# Patient Record
Sex: FEMALE | Race: OTHER | HISPANIC OR LATINO | Employment: OTHER | URBAN - METROPOLITAN AREA
[De-identification: names, ages, dates, MRNs, and addresses within clinical notes are randomized per-mention and may not be internally consistent; named-entity substitution may affect disease eponyms.]

---

## 2018-07-20 ENCOUNTER — OFFICE VISIT (OUTPATIENT)
Dept: FAMILY MEDICINE CLINIC | Facility: CLINIC | Age: 58
End: 2018-07-20
Payer: COMMERCIAL

## 2018-07-20 VITALS
BODY MASS INDEX: 45.82 KG/M2 | TEMPERATURE: 98 F | RESPIRATION RATE: 16 BRPM | DIASTOLIC BLOOD PRESSURE: 80 MMHG | WEIGHT: 198 LBS | HEIGHT: 55 IN | HEART RATE: 92 BPM | SYSTOLIC BLOOD PRESSURE: 150 MMHG | OXYGEN SATURATION: 97 %

## 2018-07-20 DIAGNOSIS — G89.29 CHRONIC LOW BACK PAIN WITH RIGHT-SIDED SCIATICA, UNSPECIFIED BACK PAIN LATERALITY: ICD-10-CM

## 2018-07-20 DIAGNOSIS — R79.89 LFT ELEVATION: ICD-10-CM

## 2018-07-20 DIAGNOSIS — I10 ESSENTIAL HYPERTENSION: Primary | ICD-10-CM

## 2018-07-20 DIAGNOSIS — R73.9 HYPERGLYCEMIA: ICD-10-CM

## 2018-07-20 DIAGNOSIS — R00.2 PALPITATIONS: ICD-10-CM

## 2018-07-20 DIAGNOSIS — M54.41 CHRONIC LOW BACK PAIN WITH RIGHT-SIDED SCIATICA, UNSPECIFIED BACK PAIN LATERALITY: ICD-10-CM

## 2018-07-20 DIAGNOSIS — E78.5 HYPERLIPIDEMIA, UNSPECIFIED HYPERLIPIDEMIA TYPE: ICD-10-CM

## 2018-07-20 DIAGNOSIS — Z00.01 ENCOUNTER FOR GENERAL ADULT MEDICAL EXAMINATION WITH ABNORMAL FINDINGS: ICD-10-CM

## 2018-07-20 PROCEDURE — G0439 PPPS, SUBSEQ VISIT: HCPCS | Performed by: FAMILY MEDICINE

## 2018-07-20 PROCEDURE — 93000 ELECTROCARDIOGRAM COMPLETE: CPT | Performed by: FAMILY MEDICINE

## 2018-07-20 RX ORDER — LISINOPRIL 10 MG/1
10 TABLET ORAL DAILY
COMMUNITY
End: 2018-07-20 | Stop reason: SINTOL

## 2018-07-20 RX ORDER — LOSARTAN POTASSIUM 50 MG/1
25 TABLET ORAL DAILY
Qty: 30 TABLET | Refills: 5 | Status: SHIPPED | OUTPATIENT
Start: 2018-07-20 | End: 2018-10-11 | Stop reason: SDUPTHER

## 2018-07-20 RX ORDER — CARVEDILOL 12.5 MG/1
12.5 TABLET ORAL 2 TIMES DAILY WITH MEALS
COMMUNITY
End: 2018-12-17 | Stop reason: SDUPTHER

## 2018-07-20 RX ORDER — BACLOFEN 20 MG/1
20 TABLET ORAL 2 TIMES DAILY PRN
COMMUNITY
End: 2018-11-23 | Stop reason: ALTCHOICE

## 2018-07-20 RX ORDER — METFORMIN HYDROCHLORIDE 750 MG/1
750 TABLET, EXTENDED RELEASE ORAL
COMMUNITY
End: 2018-08-22

## 2018-07-20 RX ORDER — ATORVASTATIN CALCIUM 40 MG/1
40 TABLET, FILM COATED ORAL DAILY
COMMUNITY
End: 2018-08-22 | Stop reason: SINTOL

## 2018-07-20 NOTE — ASSESSMENT & PLAN NOTE
Her low back pain is continue with myelopathy symptoms which require an MRI of the lumbar spine to rule out compression of the nerve roots or herniated disc

## 2018-07-20 NOTE — PATIENT INSTRUCTIONS
Visita de bienestar para los adultos   CUIDADO AMBULATORIO:   Amaury visita de bienestar  es cuando usted acude con un médico para que le phyllis exámenes de detección de problemas de [place]  También puede obtener asesoramiento sobre cómo mantenerse saludable  Anote jess preguntas para que se acuerde de hacerlas  Pregunte a morales médico con qué frecuencia debería realizarse amaury visita de bienestar  Lo que sucede en amaury visita de bienestar:  Morales médico le preguntará sobre morales adwoa y morales historia familiar [address]  [place] incluye presión arterial gerry, enfermedades del corazón y cáncer  El médico le preguntará si tiene síntomas que le preocupen, si [facility] y [place] de ánimo  También se le preguntará acerca del uso de medicamentos, suplementos, alimentos y alcohol  Es posible que le phyllis cualquiera de lo siguiente:  · Morales peso  será revisado  Es posible que [facility] midan morales altura para calcular morales índice de masa corporal [facility]  El [facility] indica si tiene un peso saludable  · Se verificarán morales presión arterial  y frecuencia cardíaca  También pueden revisar morales temperatura  · Exámenes de [place] y [facility]  se podría realizar  Se podrían realizar exámenes de dunia para revisar los niveles de [facility]  Los niveles anormales de colesterol aumentan el riesgo de enfermedad del corazón y accidente cerebrovascular  Puede que también necesite amaury prueba de dunia u orina para revisar si tiene diabetes si usted está en mayor riesgo  Las pruebas de orina pueden hacerse para buscar signos de amaury infección o amaury enfermedad renal      · Un examen físico  incluye la comprobación de jess latidos del corazón y los pulmones con un estetoscopio  Morales médico también podría revisarle la piel para buscar daños causados por el sol  · Pruebas de detección  podría recomendarse  Se realiza un examen de detección para detectar enfermedades que pueden no causar síntomas   Los exámenes de detección que necesite dependen de morales edad, género, antecedentes familiares y hábitos de cortney  Por ejemplo, podrían recomendarle la exploración selectiva colorrectal si tiene 48 años o más  Si es [place], necesita los siguientes exámenes de detección:   · El examen de Papanicolaou  se utiliza para detectar cáncer de jose uterino  El examen del Papanicolaou por lo general se realiza entre 3 a 5 años dependiendo de morales edad  Puede necesitarlo más a menudo si usted ha tenido [employer] de la prueba de Papanicolaou en el pasado  Pregunte a morales médico con qué frecuencia debería realizarse un examen de Papanicolaou  · Amaury mamografía  es amaury radiografía de jess senos para detectar cáncer de mama  Los expertos recomiendan [address] cada 2 años empezando a los 48 años de [place]  Es probable que usted necesite [address] a los 52 años o antes si tiene riesgo alto de cáncer de seno  Hable con morales médico sobre cuándo debe empezar con jess mamografías y con cuánta frecuencia las necesita  Vacunas que podría necesitar:   · Debe recibir amaury vacuna contra la influenza  todos los [place]  La vacuna contra la influenza protege de la gripe  Varios tipos de virus causan la influenza  Debido a que los virus [place] con el [place], es necesaria la producción de nuevas vacunas cada año  · Debe recibir Igor Myers vacuna de refuerzo contra el tétanos-difteria (Td)  cada 10 años  Esta vacuna protege contra el tétanos y la difteria  El tétanos es amaury infección severa que puede causar trismo y espasmos musculares dolorosos  La difteria es amaury infección bacterial grave que produce amaury cubierta gruesa en la parte de atrás de la boca y garganta  · Debe recibir la vacuna contra el virus del papiloma humano (VPH)  si usted es rose y [place] 19 y 32 años o es hombre y [place] 23 y 24 años y nunca la recibió  Esta vacuna protege contra la infección por VPH   El virus del papiloma humano o VPH es la infección más común que se transmite por contacto sexual  El virus del papiloma humano también podría provocar cáncer vaginal, del pene y del ano  · Debe recibir la vacuna antineumocócica  si tiene más de 72 años  La vacuna antineumocócica es amaury inyección que se administra para protegerlo contra amaury enfermedad neumocócica  La enfermedad neumocócica es amaury infección causada por la bacteria neumocócica  La infección puede causar neumonía, meningitis o amaury infección del oído  · Debe recibir la vacuna contra la culebrilla  si tiene [address] [place] [address],[address] [place] [address] o [place], incluso si stern tenido culebrilla antes  La vacuna contra la culebrilla (herpes zóster) es amaury inyección usada para proteger contra el virus zóster que causa la varicela  [place] es el mismo virus que causa la varicela  La culebrilla es un sarpullido doloroso que se desarrolla en personas que tuvieron varicela o stern estado expuestas al virus  Cómo comer saludable:  [place] es un modelo para planear comidas sanas  Muestra los tipos y cantidades de alimentos que deberían ir en un plato  Reza Salmons y verduras representan alrededor de la mitad de morales plato y los granos y proteínas representan la otra mitad  Se incluye amaury porción de productos lácteos al lado del plato  La cantidad de calorías y [address] de las porciones que usted necesita dependen de morales edad, [place], peso y altura  Los ejemplos de alimentos saludables son:  · Consuma amaury variedad de verduras  srinivasa las de color luisana oscuro, cho y [facility]  Usted también puede incluir verduras enlatadas bajas en sodio (sal) y verduras congeladas sin mantequilla ni salsas [id number]  · Consuma amaury variedad de fruta frescas , las frutas enlatadas en 100% de jugo , fruta [place] y new secos  · Incluya granos enteros  Por lo menos la mitad de los granos que usted consume deben ser granos de gayatri integral  Por ejemplo, panes de grano entero, pasta integral, arroz integral y cereales de grano entero srinivasa la jorge      · Consuma amaury variedad de alimentos con proteínas srinivasa mariscos (pescado y crustáceos), carne magra y carne de ave sin piel (pavo y jose)  Ejemplos de nova magras incluyen pierna, paleta o lomo de puerco y xochitl, solomillo o, lomo de res y carne [place] de res extra [place]  Otros alimentos ricos en proteínas son los huevos y sustitutos de [place], frijoles, chícharos, productos de soya, nueces y semillas  · Elija productos lácteos bajos en grasa [employer] o del 1% o yogur, queso y requesón bajos en grasa  · Limite las grasas poco saludables,  srinivasa la [place], la margarina dura y la [place]  Ejercicio:  Realice amaury actividad física por lo menos 30 minutos al día, la mayoría de los días de la [place]  Algunos de los ejercicios incluyen caminar, montar en bicicleta, bailar y nadar  Usted también puede realizar más actividad física usando las escaleras en vez de los ascensores o estacionarse más lejos cuando [place] a las tiendas  Incluya ejercicios para fortalecer los músculos 2 días a la semana  El ejercicio regular proporciona muchos beneficios para la adwoa  Sony Hastings a controlar morales peso y [employer] riesgo de diabetes tipo 2, presión arterial gerry, enfermedad del corazón y accidente cerebrovascular  El ejercicio [employer] puede ayudar a mejorar morales estado de ánimo  Pregunte a morales médico acerca del mejor plan de ejercicio para usted  Pautas generales de adwoa y seguridad:   · No fume  La nicotina y otras sustancias químicas que contienen los cigarrillos y cigarros pueden dañar los pulmones  Pida información a morales médico si usted actualmente fuma y necesita ayuda para dejar de fumar  Los cigarrillos electrónicos o tabaco sin humo todavía contienen nicotina  Consulte con morales médico antes de [employer]  · Limite el consumo de alcohol  Un trago equivale a 12 onzas de cerveza, 5 onzas de vino o 1 onza y ½ de licor  · Baje de peso, si es necesario  El sobrepeso aumenta el riesgo de ciertas condiciones de [place]   Estos incluyen enfermedad del corazón, presión arterial gerry, diabetes tipo 2 y ciertos tipos de cáncer  · Proteja morales piel  No tome el sol ni use reuben de bronceado  Use protector solar con un SPF 13 o mayor  Aplíquese el bloqueador por lo menos 15 minutos antes de que vaya a estar al [employer]  Vuelva a aplicarse la crema solar cada 2 horas  Use ropa protectora, sombrero y lentes para el sol cuando se encuentre afuera  · Conduzca con seguridad  Use siempre morales cinturón de seguridad  Asegúrese que todos en el noel usan el cinturón de seguridad  Un cinturón de seguridad puede salvar morales cortney en saumya de un accidente automovilístico  No use el celular cuando esté manejando  [place] puede hacer que se distraiga y causar un accidente  Es mejor que pare y se orille si necesita hacer amaury Carlene Janis un texto  · Practique el sexo seguro  Use condones de látex si es sexualmente [place] y tiene más de [place]  Morales médico puede recomendar exámenes de detección de infecciones de transmisión sexual (ITS)  · Use un facundo, un chaleco salvavidas y unos implementos de protección  Siempre use un facundo al [employer] en bicicleta o motocicleta, esquiar o jugar deportes que podrían causar amaury lesión en la cynthia  Use implementos de protección cuando practique deportes  Use un chaleco salvavidas cuando esté en un bote o practicando actividades acuáticas  © 2017 [address] Information is for End User's use only and may not be sold, redistributed or otherwise used for commercial purposes  All illustrations and images included in CareNotes® are the copyrighted property of A D A M , Inc  or Isaiah Coleman  Esta información es sólo para uso en educación  Morales intención no es darle un consejo médico sobre enfermedades o tratamientos  Colsulte con morales [place] [employer] farmacéutico antes de seguir cualquier régimen médico para saber si es seguro y efectivo para usted

## 2018-07-20 NOTE — ASSESSMENT & PLAN NOTE
Her Medical issues today is obesity and lower back pain recommendation of weight control and exercise is number 1

## 2018-07-20 NOTE — PROGRESS NOTES
Assessment/Plan:    Encounter for general adult medical examination with abnormal findings  Her Medical issues today is obesity and lower back pain recommendation of weight control and exercise is number 1  Essential hypertension  Her blood pressure is and so optimal control, patient is having cough possible related to lisinopril as a side effect  We are changing to losartan 50 mg once a day, recheck her blood pressure in one month  Chronic low back pain with right-sided sciatica  Her low back pain is continue with myelopathy symptoms which require an MRI of the lumbar spine to rule out compression of the nerve roots or herniated disc    Hyperglycemia  In a diabetic setting checking hemoglobin A1c  Hyperlipidemia  Checking labs for hyperlipidemia  LFT elevation  To rule out hepatitis  Palpitations  No findings to explain current complaints of palpitation  Diagnoses and all orders for this visit:    Essential hypertension  -     POCT ECG  -     losartan (COZAAR) 50 mg tablet; Take 0 5 tablets (25 mg total) by mouth daily  -     CBC and differential; Future  -     Comprehensive metabolic panel; Future  -     Lipid Panel with Direct LDL reflex; Future  -     TSH, 3rd generation with Free T4 reflex; Future    Encounter for general adult medical examination with abnormal findings    Hyperlipidemia, unspecified hyperlipidemia type    Hyperglycemia  -     Hemoglobin A1C; Future    LFT elevation  -     Hepatitis panel, acute; Future    Palpitations    Chronic low back pain with right-sided sciatica, unspecified back pain laterality  -     Vitamin D 25 hydroxy; Future  -     MRI lumbar spine w contrast; Future  -     Ambulatory referral to Anesthesiology; Future    Other orders  -     atorvastatin (LIPITOR) 40 mg tablet; Take 40 mg by mouth daily  -     baclofen 20 mg tablet; Take 20 mg by mouth 2 (two) times a day  -     Discontinue: lisinopril (ZESTRIL) 10 mg tablet;  Take 10 mg by mouth daily  - metFORMIN (GLUCOPHAGE-XR) 750 mg 24 hr tablet; Take 750 mg by mouth daily with breakfast  -     Discontinue: traMADol-acetaminophen (ULTRACET) 37 5-325 mg per tablet; Take 1 tablet by mouth every 6 (six) hours as needed for moderate pain  -     carvedilol (COREG) 12 5 mg tablet; Take 12 5 mg by mouth 2 (two) times a day with meals          Subjective:      Patient ID: Ayah Solitario is a 60 y o  female  PT here for annual physical exam    Back Pain   This is a chronic problem  The pain is present in the lumbar spine and sacro-iliac  The pain is at a severity of 6/10  The symptoms are aggravated by bending, lying down, twisting, standing and position  Associated symptoms include abdominal pain, leg pain (Right leg) and weakness ( tendencyTo fall over the right leg which she has we care secondary to the back pain)  Pertinent negatives include no bladder incontinence, bowel incontinence, chest pain, dysuria, fever or headaches  The treatment provided mild relief  The following portions of the patient's history were reviewed and updated as appropriate: allergies, current medications, past family history, past medical history, past social history, past surgical history and problem list     Review of Systems   Constitutional: Negative for fever  Cardiovascular: Negative for chest pain  Gastrointestinal: Positive for abdominal distention and abdominal pain  Negative for bowel incontinence  Genitourinary: Negative for bladder incontinence and dysuria  Musculoskeletal: Positive for back pain  Neurological: Positive for weakness ( tendencyTo fall over the right leg which she has we care secondary to the back pain)  Negative for headaches  Objective:      /80 (BP Location: Left arm, Patient Position: Sitting, Cuff Size: Standard)   Pulse 92   Temp 98 °F (36 7 °C) (Oral)   Resp 16   Ht 4' 4" (1 321 m)   Wt 89 8 kg (198 lb)   SpO2 97%   Breastfeeding?  No   BMI 51 48 kg/m²          Physical Exam   Constitutional: She is oriented to person, place, and time  She appears well-developed and well-nourished  Obese looking  HENT:   Head: Normocephalic  Eyes: EOM are normal  Pupils are equal, round, and reactive to light  Neck: Neck supple  Cardiovascular: Normal rate, regular rhythm and normal heart sounds  Pulmonary/Chest: Effort normal and breath sounds normal    Abdominal: Soft  She exhibits distension  Musculoskeletal: Normal range of motion  She exhibits tenderness  Neurological: She is alert and oriented to person, place, and time  Skin: Skin is warm and dry  Psychiatric: She has a normal mood and affect   Her behavior is normal  Judgment and thought content normal

## 2018-07-20 NOTE — ASSESSMENT & PLAN NOTE
Her blood pressure is and so optimal control, patient is having cough possible related to lisinopril as a side effect  We are changing to losartan 50 mg once a day, recheck her blood pressure in one month

## 2018-07-25 LAB
LEFT EYE DIABETIC RETINOPATHY: NORMAL
RIGHT EYE DIABETIC RETINOPATHY: NORMAL
SEVERITY (EYE EXAM): NORMAL

## 2018-08-17 ENCOUNTER — APPOINTMENT (OUTPATIENT)
Dept: LAB | Facility: HOSPITAL | Age: 58
End: 2018-08-17
Payer: COMMERCIAL

## 2018-08-17 ENCOUNTER — TRANSCRIBE ORDERS (OUTPATIENT)
Dept: ADMINISTRATIVE | Facility: HOSPITAL | Age: 58
End: 2018-08-17

## 2018-08-17 DIAGNOSIS — R79.89 LFT ELEVATION: ICD-10-CM

## 2018-08-17 DIAGNOSIS — I10 ESSENTIAL HYPERTENSION: ICD-10-CM

## 2018-08-17 DIAGNOSIS — M54.41 CHRONIC LOW BACK PAIN WITH RIGHT-SIDED SCIATICA, UNSPECIFIED BACK PAIN LATERALITY: ICD-10-CM

## 2018-08-17 DIAGNOSIS — R73.9 HYPERGLYCEMIA: ICD-10-CM

## 2018-08-17 DIAGNOSIS — G89.29 CHRONIC LOW BACK PAIN WITH RIGHT-SIDED SCIATICA, UNSPECIFIED BACK PAIN LATERALITY: ICD-10-CM

## 2018-08-17 LAB
25(OH)D3 SERPL-MCNC: 16.6 NG/ML (ref 30–100)
ALBUMIN SERPL BCP-MCNC: 4.2 G/DL (ref 3–5.2)
ALP SERPL-CCNC: 80 U/L (ref 43–122)
ALT SERPL W P-5'-P-CCNC: 24 U/L (ref 9–52)
ANION GAP SERPL CALCULATED.3IONS-SCNC: 8 MMOL/L (ref 5–14)
AST SERPL W P-5'-P-CCNC: 16 U/L (ref 14–36)
BASOPHILS # BLD AUTO: 0 THOUSANDS/ΜL (ref 0–0.1)
BASOPHILS NFR BLD AUTO: 1 % (ref 0–1)
BILIRUB SERPL-MCNC: 0.3 MG/DL
BUN SERPL-MCNC: 16 MG/DL (ref 5–25)
CALCIUM SERPL-MCNC: 9.4 MG/DL (ref 8.4–10.2)
CHLORIDE SERPL-SCNC: 103 MMOL/L (ref 97–108)
CHOLEST SERPL-MCNC: 221 MG/DL
CO2 SERPL-SCNC: 29 MMOL/L (ref 22–30)
CREAT SERPL-MCNC: 0.57 MG/DL (ref 0.6–1.2)
EOSINOPHIL # BLD AUTO: 0.3 THOUSAND/ΜL (ref 0–0.4)
EOSINOPHIL NFR BLD AUTO: 3 % (ref 0–6)
ERYTHROCYTE [DISTWIDTH] IN BLOOD BY AUTOMATED COUNT: 14.5 %
GFR SERPL CREATININE-BSD FRML MDRD: 102 ML/MIN/1.73SQ M
GLUCOSE P FAST SERPL-MCNC: 126 MG/DL (ref 70–99)
HCT VFR BLD AUTO: 37.5 % (ref 36–46)
HDLC SERPL-MCNC: 46 MG/DL (ref 40–59)
HGB BLD-MCNC: 12.5 G/DL (ref 12–16)
LDLC SERPL CALC-MCNC: 151 MG/DL
LYMPHOCYTES # BLD AUTO: 2.3 THOUSANDS/ΜL (ref 0.5–4)
LYMPHOCYTES NFR BLD AUTO: 28 % (ref 20–50)
MCH RBC QN AUTO: 30.1 PG (ref 26–34)
MCHC RBC AUTO-ENTMCNC: 33.4 G/DL (ref 31–36)
MCV RBC AUTO: 90 FL (ref 80–100)
MONOCYTES # BLD AUTO: 0.5 THOUSAND/ΜL (ref 0.2–0.9)
MONOCYTES NFR BLD AUTO: 7 % (ref 1–10)
NEUTROPHILS # BLD AUTO: 5.1 THOUSANDS/ΜL (ref 1.8–7.8)
NEUTS SEG NFR BLD AUTO: 62 % (ref 45–65)
PLATELET # BLD AUTO: 273 THOUSANDS/UL (ref 150–450)
PMV BLD AUTO: 9 FL (ref 8.9–12.7)
POTASSIUM SERPL-SCNC: 4.2 MMOL/L (ref 3.6–5)
PROT SERPL-MCNC: 7.7 G/DL (ref 5.9–8.4)
RBC # BLD AUTO: 4.16 MILLION/UL (ref 4–5.2)
SODIUM SERPL-SCNC: 140 MMOL/L (ref 137–147)
TRIGL SERPL-MCNC: 118 MG/DL
TSH SERPL DL<=0.05 MIU/L-ACNC: 3.18 UIU/ML (ref 0.47–4.68)
WBC # BLD AUTO: 8.2 THOUSAND/UL (ref 4.5–11)

## 2018-08-17 PROCEDURE — 85025 COMPLETE CBC W/AUTO DIFF WBC: CPT

## 2018-08-17 PROCEDURE — 84443 ASSAY THYROID STIM HORMONE: CPT

## 2018-08-17 PROCEDURE — 36415 COLL VENOUS BLD VENIPUNCTURE: CPT

## 2018-08-17 PROCEDURE — 80074 ACUTE HEPATITIS PANEL: CPT

## 2018-08-17 PROCEDURE — 80053 COMPREHEN METABOLIC PANEL: CPT

## 2018-08-17 PROCEDURE — 83036 HEMOGLOBIN GLYCOSYLATED A1C: CPT

## 2018-08-17 PROCEDURE — 80061 LIPID PANEL: CPT

## 2018-08-17 PROCEDURE — 82306 VITAMIN D 25 HYDROXY: CPT

## 2018-08-18 LAB
EST. AVERAGE GLUCOSE BLD GHB EST-MCNC: 154 MG/DL
HAV IGM SER QL: NORMAL
HBA1C MFR BLD: 7 % (ref 4.2–6.3)
HBV CORE IGM SER QL: NORMAL
HBV SURFACE AG SER QL: NORMAL
HCV AB SER QL: NORMAL

## 2018-08-21 ENCOUNTER — OFFICE VISIT (OUTPATIENT)
Dept: FAMILY MEDICINE CLINIC | Facility: CLINIC | Age: 58
End: 2018-08-21
Payer: COMMERCIAL

## 2018-08-21 VITALS
BODY MASS INDEX: 45.36 KG/M2 | SYSTOLIC BLOOD PRESSURE: 146 MMHG | RESPIRATION RATE: 16 BRPM | HEIGHT: 55 IN | WEIGHT: 196 LBS | HEART RATE: 89 BPM | TEMPERATURE: 98 F | DIASTOLIC BLOOD PRESSURE: 90 MMHG | OXYGEN SATURATION: 97 %

## 2018-08-21 DIAGNOSIS — H61.23 BILATERAL IMPACTED CERUMEN: Primary | ICD-10-CM

## 2018-08-21 DIAGNOSIS — G89.29 CHRONIC LOW BACK PAIN WITH RIGHT-SIDED SCIATICA, UNSPECIFIED BACK PAIN LATERALITY: ICD-10-CM

## 2018-08-21 DIAGNOSIS — Z12.11 SCREENING FOR COLON CANCER: ICD-10-CM

## 2018-08-21 DIAGNOSIS — Z12.39 SCREENING FOR BREAST CANCER: ICD-10-CM

## 2018-08-21 DIAGNOSIS — M54.41 CHRONIC LOW BACK PAIN WITH RIGHT-SIDED SCIATICA, UNSPECIFIED BACK PAIN LATERALITY: ICD-10-CM

## 2018-08-21 PROCEDURE — 3008F BODY MASS INDEX DOCD: CPT | Performed by: FAMILY MEDICINE

## 2018-08-21 PROCEDURE — 99214 OFFICE O/P EST MOD 30 MIN: CPT | Performed by: FAMILY MEDICINE

## 2018-08-21 NOTE — PROGRESS NOTES
Assessment/Plan:    Chronic low back pain with right-sided sciatica  She has a red appointment for an MRI of the lumbar spine to rule out hernia of the disc  Bilateral impacted cerumen  Irrigation was performed with the right ear  Diagnoses and all orders for this visit:    Bilateral impacted cerumen  -     Ear cerumen removal    Screening for breast cancer  -     Mammo screening bilateral w cad; Future    Screening for colon cancer  -     Ambulatory Referral to GI Endoscopy; Future    Chronic low back pain with right-sided sciatica, unspecified back pain laterality          Subjective:      Patient ID: Maude Matthews is a 60 y o  female  Pt here to discuss labwork and upcoming MRI spine      Back Pain   This is a recurrent problem  The current episode started more than 1 year ago  The problem occurs constantly  The problem has been waxing and waning since onset  The pain is present in the gluteal and lumbar spine  The quality of the pain is described as aching  The pain is at a severity of 4/10  The pain is moderate  The symptoms are aggravated by lying down, position, sitting, standing and twisting  Pertinent negatives include no abdominal pain, chest pain, fever or headaches  Risk factors include obesity and lack of exercise  The treatment provided moderate relief  Hypertension   This is a recurrent problem  The current episode started more than 1 year ago  The problem has been waxing and waning since onset  Pertinent negatives include no chest pain, headaches, palpitations or shortness of breath  Risk factors for coronary artery disease include obesity, diabetes mellitus and dyslipidemia  The current treatment provides moderate improvement  Compliance problems include exercise and diet          The following portions of the patient's history were reviewed and updated as appropriate: allergies, current medications, past family history, past medical history, past social history, past surgical history and problem list     Review of Systems   Constitutional: Positive for unexpected weight change (gaining weight)  Negative for fatigue and fever  HENT: Negative for sore throat and trouble swallowing  Right here tender and hearing loss  Eyes: Negative for photophobia  Respiratory: Negative for cough, chest tightness, shortness of breath and wheezing  Choking: persistent but stable  Cardiovascular: Negative for chest pain, palpitations and leg swelling  Gastrointestinal: Negative for abdominal pain, blood in stool, nausea and vomiting  Genitourinary: Negative for hematuria  Musculoskeletal: Positive for back pain  Neurological: Negative for dizziness, syncope, light-headedness and headaches  Hematological: Does not bruise/bleed easily  Psychiatric/Behavioral: Positive for sleep disturbance  Objective:      /90 (BP Location: Left arm, Patient Position: Sitting, Cuff Size: Standard)   Pulse 89   Temp 98 °F (36 7 °C) (Oral)   Resp 16   Ht 4' 4" (1 321 m)   Wt 88 9 kg (196 lb)   SpO2 97%   Breastfeeding? No   BMI 50 96 kg/m²          Physical Exam   Constitutional: She is oriented to person, place, and time  She appears well-developed and well-nourished  Obese looking  HENT:   Head: Normocephalic  Right ear impaction of wax  Eyes: EOM are normal  Pupils are equal, round, and reactive to light  Neck: Neck supple  Cardiovascular: Normal rate, regular rhythm and normal heart sounds  Pulmonary/Chest: Effort normal    Abdominal: Soft  Bowel sounds are normal  She exhibits distension  There is tenderness (Difficult to exam due to habitus  Very obese  )  Musculoskeletal: Normal range of motion  She exhibits tenderness (Difficult to exam due to severe obesity pressure patient for decreased range of motion tenderness of the lumbar spine when exam   No neurological deficits or weakness of legs  )  Neurological: She is alert and oriented to person, place, and time   She has normal reflexes  Skin: Skin is warm and dry  Psychiatric: She has a normal mood and affect   Her behavior is normal  Judgment and thought content normal

## 2018-08-21 NOTE — PATIENT INSTRUCTIONS
Plan de alimentación con "enfoque dietético para detener la hipertensión (DASH, por jess siglas en inglés)   LO QUE NECESITA SABER:   ¿Qué es el plan de alimentación con enfoque dietético para detener la hipertensión (DASH, por jess siglas en inglés)? El plan de alimentación DASH está diseñado para ayudar a prevenir o disminuir la hipertensión  También puede ayudar a bajar el colesterol jelani (colesterol LDL) y disminuír morales riesgo de enfermedad cardíaca  El plan es bajo en sodio, azúcar, grasas dañinas, y grasas en morales totalidad  Es alto en potasio, calcio, magnesio y [place]  Estos nutrientes se agregan al consumir más frutas, vegetales y granos enteros  ¿Cuál es mi límite de sodio por día? Morales dietista le indicará la cantidad de sodio que usted debe consumir a diario  La gente que tiene la presión arterial gerry debe consumir de 1,500 a 2,300 mg de sodio al día srinivasa jalyn  Amaury cucharadita (cdta) de sal tiene 2,300 mg de sodio  Chireno puede parecer srinivasa amaury meta difícil, luan pequeños cambios en los alimentos que usted consume pueden hacer amaury gran diferencia  Morales médico o dietista puede ayudarlo a crear un plan alimenticio que cumpla morales límite de sodio  ¿Cómo limito mi consumo de sodio? · Katie las etiquetas de los alimentos  Las etiquetas pueden ayudarle a escoger alimentos bajos en sodio  La cantidad de sodio está incluida en miligramos (mg)  La columna del porcentaje de valor diario indica la cantidad de necesidades diarias satisfechas con 1 porción del alimento para cada nutriente en la lista  Escoja alimentos que tengan menos de 5% del porcentaje diario de [place]  Estos alimentos se consideran bajos en sodio  Los alimentos que tienen 20% o más del porcentaje diario de sodio se consideran alimentos altos en sodio  Evite alimentos que tengan más de 300 mg de sodio por porción  Escoja alimentos Teagan Deem diga que son bajos en sodio, con sodio reducido, o sin sal agregada             · Evite la sal   No le agregue sal a la comida cuando se siente a la uribe a comer, y agregue muy poca sal a los alimentos torri la cocción  Use hierbas y condimentos, srinivasa cebollas, ajo y especias sin sal para agregar sabor a los alimentos  Use jugo de lima, cee o vinagre para darle a los alimentos un sabor ácido  Use chiles picantes o amaury cantidad pequeña de salsa picante para agregar un sabor picante a los alimentos  · Pregunte sobre los sustitutos para la sal   Pregúntele a morales médico si es posible usar sustitutos de la sal  Algunos sustitutos de la sal vienen con ingredientes que pueden ser dañinos si usted tiene ciertos padecimientos médicos  · Escoja los alimentos cuidadosamente cuando sale a comer a restaurantes  las comidas de los restaurantes, sobre todo restaurantes de comida rápida, keon siempre son altas en sodio  Algunos restaurantes ofrecen información nutricional que indica la cantidad de sodio en jess alimentos  Pida que preparen jess comidas con menos sal o sin sal   ¿Qué kuldeep saber sobre las grasas? · Incluya grasas saludables  Las grasas insaturadas y los ácidos grasos omega-3 son ejemplos de grasas saludables  Las grasas insaturadas se encuentran en los aceites de soja, [place], [place] o [place] y la margarina [place] y [place]  Los ácidos grasos [place] 3 se encuentran en el pescado con grasa, srinivasa el salmón, el atún, la caballa y las young  También se le puede encontrar en el aceita de linaza y en la linaza molida  · Evite las grasas insalubres  No consuma grasas dañinas, srinivasa grasas saturadas y grasas trans  Las grasas saturadas se encuentran en alimentos que contienen grasa animal  Iglesia Ripper son Crystal Bloch grasosas, la [place], la [place], la crema y otros productos lácteos   Además se pueden encontrar en la [place], la margarina en maria, el aceite de saldana y el aceite de corey  Las grasas trans se encuentran en comidas fritas, galletas estilo soda, tortillitas tostadas, y alimentos horneados hechos con margarina o manteca  ¿Qué alimentos debería incluir? Con el plan de alimentación DASH, usted necesita consumir un número específico de porciones de cada josi de alimentos  Newnan le ayudará a consumir las cantidades suficientes de ciertos nutrientes y limitar otros  La cantidad de porciones que usted debe comer depende de la cantidad de calorías que usted necesita  Schmidt dietista le informará sobre cuántas calorías necesita usted  El número de porciones que viene en la lista al lado de los grupos alimenticios a continuación es para las personas que necesitan aproximadamente 2,000 calorías al día    · Granos:  6 a 8 porciones (3 de estas porciones deben ser de alimentos de granos enteros o integrales)    ¨ 1 tajada de pan integral     ¨ 1 onza de cereal seco    ¨ ½ taza de cereal cocinado, pasta, o arroz integral    · Verduras y frutas:  4 a 5 porciones de frutas y 4 a 5 porciones de vegetales    ¨ 1 fruta mediana    ¨ 1 taza de vegetales crudos de hojas    ¨ ½ taza de vegetales congelados, enlatados (sin sal adicional), o vegetales frescos y picados     ¨ ½ taza de fruta fresca, congelada, seca o enlatada (enlatada en sirope liviano o jugo de fruta)    ¨ ½ taza de jugo de verduras o frutas    · Productos lácteos:  2 a 3 porciones    ¨ 1 taza de [employer] o leche 1%    ¨ 1½ onzas de queso descremado o bajo en grasas y bajo en sodio    ¨ 6 onzas de yogurt descremado o bajo en grasas    · Aga ortiz, aga juanjo y pescado magros:  6 onzas o menos    ¨ Aga juanjo (jose, pavo) sin piel    ¨ Pescado (sobre todo pescado con grasa, srinivasa salmón, atún fresco o GARCIA)    ¨ Juan Josee de res o de cerdo magra (morgan douglas extra [place])    ¨ Claras de huevo y sustitutos del huevo    · Howell du sac, semillas y legumbres:  4 a 5 porciones por semana    ¨ ½ taza de frijoles y arbejas cocinadas    ¨ 1½ onzas de nueces sin sal    ¨ 2 cucharadas de mantequilla de maní o semillas    · Dulces y azúcares adicionales:  5 o menos por semana    ¨ 1 cucharada de azúcar, jalea o mermelada    ¨ ½ taza de sorbeto o gelatina    ¨ 1 taza de limonada    · Grasas:  2 a 3 porciones por semana    ¨ 1 cucharadita de margarina suave o aceite vegetal    ¨ 1 cucharada de [place]    ¨ 2 cucharadas de aderezo para ensaladas  ¿Qué alimentos kuldeep evitar?    · Granos:      ¨ Postres horneados o fritos srinivasa donas, pastelitos, galletas, y quequitos (altos en grasas y azúcar)    ¨ Mezclas para hacer pan de maíz y pasteles, alimentos empacados, srinivasa rellenos para pavo, mezclas para hacer arroz y pasta, macarrones con Chris-barre, y cereales instantáneos (altos en sodio)    · Frutas y verduras:      ¨ Vegetales regulares enlatados (altos en sodio)    ¨ Repollo preparado en vinagre, vegetales en vinagre, y otros alimentos preparados en escabeche (altos en sodio)    ¨ Vegetales fritos o vegetales en mantequilla o salsas altas en grasas    ¨ Frutas en crema o salsa de mantequilla (altas en grasas)    · Productos lácteos:      ¨ Leche entera, leche semi descremada (2%), y crema (gerry en grasas)    ¨ Queso regular y queso procesado (alto en grasa y sodio)    · Aga y alimentos con proteínas:      ¨ Aga ahumadas o curadas, srinivasa carne de adrien en conserva, tocino, jamón, perros calientes, y salchicha (gerry en grasas y sodio)    ¨ Frijoles enlatados y aga enlatadas o aga en pasta, srinivasa aga en conserva, young, anchoas y [address] de imitación (altos en sodio)    ¨ Aga para emparedados, srinivasa [place], [place], [place], y carnbaltazar de res en rebanada (altos en sodio)    ¨ Aga altas en grasas (bistec estilo T-bone, carne molida para hamburguesas, costillas)    ¨ Huevos enteros y yemas de huevo (altos en grasas)    · Otros:      ¨ Condimentos hechos con sal, srinivasa sal de ajo, sal de apio, sal de cebolla, sal condimentada, suavizantes para aga, y glutamato de monosodio (MSG, por jess siglas en inglés)    ¨ Sopa Miso y mezclas para sopa enlatadas o secas (altas en sodio)    ¨ Salsa de soya regular, salsa de [facility], salsa [place], salsa para bistec, [employer], y la mayoría de las vinagres con sabor (altas en sodio)    ¨ Condimentos regulares, srinivasa [place], salsa de tomate y aderezos para ensalada (altos en sodio)    ¨ Salsa para nova y salsas en general, srinivasa salsa [place] o de [place] (altas en sodio y [place])    ¨ Bebidas altas en azúcar, srinivasa bebidas gaseosas o jugos de frutas    ¨ Alimentos para [address], srinivasa warren tostadas, palomitas de [place], pretzels, piel de cerdo, [address] de soda [facility], y nueces saladas    ¨ Alimentos congelados, srinivasa cenas preparadas, platos principales, vegetales con salsas, y nova cubiertas en pan (altas en sodio)  ¿Qué otras pautas kuldeep seguir? · Mantenga un peso saludable  Morales riesgo de enfermedad cardíaca es aún más alto si usted tiene sobrepeso  Morales médico podría sugerirle que adelgace si tiene sobrepeso  Usted puede perder peso si se propone consumir menos calorías y alimentos que tengan azúcar y grasas agregadas  El plan de alimentación DASH puede ayudarle a lograrlo  Veena buena forma de disminuir el consumo de calorías es consumiendo porciones más pequeñas en cada comida y menos meriendas entre comidas  Consulte a morales médico para obtener más información sobre cómo adelgazar  · Realice actividad física con regularidad  El ejercicio regular puede ayudarle a alcanzar o mantener un peso saludable  El ejercicio regular también puede ayudarle a disminuir morales presión arterial y mejorar jess niveles de colesterol  Abdiaziz ejercicios moderados por 30 minutos o más todos los días de la [place]  Para bajar peso, asegúrese de ejercitarse por lo menos 60 minutos  Consulte con morales médico sobre un programa de ejercicio adecuado para usted  · Limite el consumo de alcohol  Las mujeres deberían limitar el consumo de alcohol a 1 bebida por día  Los hombres deberían limitar el consumo de alcohol a 2 tragos al día   Un trago equivale a 12 onzas de cerveza, 5 onzas de vino o 1 onza y ½ de [address]  ¿Dónde puedo obtener más información? · [facility] [address]  [address] [address]  [place]phanie [place]grath [place] [address]  Phone: [phone]  Web Address: ItChe[employer] no  ACUERDOS Valere Gan GRIGSBY CUIDADO:   Aman Heaton tiene el derecho de ayudar a planear grigsby cuidado  Discuta jess opciones de tratamiento con jess médicos para decidir el cuidado que usted desea recibir  Usted siempre tiene el derecho de rechazar el tratamiento  Esta información es sólo para uso en educación  Grigsby intención no es darle un consejo médico sobre enfermedades o tratamientos  Colsulte con grigsby [employer] farmacéutico antes de seguir cualquier régimen médico para saber si es seguro y efectivo para usted  © 2017 [facility] Information is for End User's use only and may not be sold, redistributed or otherwise used for commercial purposes  All illustrations and images included in CareNotes® are the copyrighted property of RICKY MILTON A LALITA , [employer]  or [employer]

## 2018-08-22 VITALS — HEIGHT: 55 IN

## 2018-08-22 PROBLEM — H61.23 BILATERAL IMPACTED CERUMEN: Status: ACTIVE | Noted: 2018-08-22

## 2018-08-22 RX ORDER — MULTIVITAMIN
1 TABLET ORAL DAILY
COMMUNITY
End: 2020-02-25 | Stop reason: ALTCHOICE

## 2018-08-22 NOTE — PRE-PROCEDURE INSTRUCTIONS
Pre-Surgery Instructions:   Medication Instructions    baclofen 20 mg tablet Instructed patient per Anesthesia Guidelines   carvedilol (COREG) 12 5 mg tablet Instructed patient per Anesthesia Guidelines   losartan (COZAAR) 50 mg tablet Instructed patient per Anesthesia Guidelines   metFORMIN (GLUCOPHAGE) 500 mg tablet Instructed patient per Anesthesia Guidelines   Multiple Vitamin (MULTIVITAMIN) tablet Instructed patient per Anesthesia Guidelines      Pre procedure instructions reviewed; verbalized understanding

## 2018-08-23 ENCOUNTER — ANESTHESIA EVENT (OUTPATIENT)
Dept: RADIOLOGY | Facility: HOSPITAL | Age: 58
End: 2018-08-23

## 2018-08-29 ENCOUNTER — ANESTHESIA (OUTPATIENT)
Dept: RADIOLOGY | Facility: HOSPITAL | Age: 58
End: 2018-08-29

## 2018-08-29 ENCOUNTER — HOSPITAL ENCOUNTER (OUTPATIENT)
Dept: RADIOLOGY | Facility: HOSPITAL | Age: 58
Discharge: HOME | End: 2018-08-29

## 2018-09-10 RX ORDER — ATORVASTATIN CALCIUM 40 MG/1
40 TABLET, FILM COATED ORAL DAILY
Status: ON HOLD | COMMUNITY
End: 2018-10-10 | Stop reason: ALTCHOICE

## 2018-09-11 ENCOUNTER — ANESTHESIA EVENT (OUTPATIENT)
Dept: PERIOP | Facility: HOSPITAL | Age: 58
End: 2018-09-11
Payer: COMMERCIAL

## 2018-09-12 ENCOUNTER — HOSPITAL ENCOUNTER (OUTPATIENT)
Facility: HOSPITAL | Age: 58
Setting detail: OUTPATIENT SURGERY
Discharge: HOME | End: 2018-09-12
Attending: SURGERY | Admitting: SURGERY
Payer: COMMERCIAL

## 2018-09-12 ENCOUNTER — ANESTHESIA (OUTPATIENT)
Dept: PERIOP | Facility: HOSPITAL | Age: 58
End: 2018-09-12
Payer: COMMERCIAL

## 2018-09-12 VITALS
BODY MASS INDEX: 45.36 KG/M2 | TEMPERATURE: 96.9 F | RESPIRATION RATE: 16 BRPM | HEART RATE: 68 BPM | WEIGHT: 196 LBS | OXYGEN SATURATION: 96 % | DIASTOLIC BLOOD PRESSURE: 69 MMHG | SYSTOLIC BLOOD PRESSURE: 118 MMHG | HEIGHT: 55 IN

## 2018-09-12 LAB — GLUCOSE SERPL-MCNC: 125 MG/DL (ref 70–99)

## 2018-09-12 PROCEDURE — 82948 REAGENT STRIP/BLOOD GLUCOSE: CPT

## 2018-09-12 RX ORDER — SODIUM CHLORIDE 9 MG/ML
50 INJECTION, SOLUTION INTRAVENOUS CONTINUOUS
Status: DISCONTINUED | OUTPATIENT
Start: 2018-09-13 | End: 2018-09-12 | Stop reason: HOSPADM

## 2018-09-12 RX ORDER — PROPOFOL 10 MG/ML
INJECTION, EMULSION INTRAVENOUS AS NEEDED
Status: DISCONTINUED | OUTPATIENT
Start: 2018-09-12 | End: 2018-09-12 | Stop reason: SURG

## 2018-09-12 RX ORDER — LIDOCAINE HYDROCHLORIDE 10 MG/ML
INJECTION, SOLUTION INFILTRATION; PERINEURAL AS NEEDED
Status: DISCONTINUED | OUTPATIENT
Start: 2018-09-12 | End: 2018-09-12 | Stop reason: SURG

## 2018-09-12 RX ADMIN — PROPOFOL 50 MG: 10 INJECTION, EMULSION INTRAVENOUS at 09:29

## 2018-09-12 RX ADMIN — PROPOFOL 50 MG: 10 INJECTION, EMULSION INTRAVENOUS at 09:32

## 2018-09-12 RX ADMIN — PROPOFOL 50 MG: 10 INJECTION, EMULSION INTRAVENOUS at 09:38

## 2018-09-12 RX ADMIN — LIDOCAINE HYDROCHLORIDE 50 MG: 10 INJECTION, SOLUTION INFILTRATION; PERINEURAL at 09:27

## 2018-09-12 RX ADMIN — PROPOFOL 100 MG: 10 INJECTION, EMULSION INTRAVENOUS at 09:27

## 2018-09-12 RX ADMIN — PROPOFOL 50 MG: 10 INJECTION, EMULSION INTRAVENOUS at 09:35

## 2018-09-12 RX ADMIN — SODIUM CHLORIDE 50 ML/HR: 9 INJECTION, SOLUTION INTRAVENOUS at 07:29

## 2018-09-12 NOTE — ANESTHESIA PREPROCEDURE EVALUATION
Review of Systems/Medical History          Cardiovascular  Exercise tolerance (METS): <4,  Hyperlipidemia, Hypertension controlled,    Pulmonary  Negative pulmonary ROS        GI/Hepatic            Endo/Other  Diabetes well controlled type 2 Oral agent,      GYN       Hematology  Negative hematology ROS      Musculoskeletal  Negative musculoskeletal ROS Back pain , lumbar pain,   Arthritis     Neurology  Negative neurology ROS      Psychology   Negative psychology ROS              Physical Exam    Airway    Mallampati score: II  TM Distance: >3 FB  Neck ROM: full     Dental   Comment: Front tooth lower incisor loose ,     Cardiovascular  Cardiovascular exam normal    Pulmonary  Pulmonary exam normal     Other Findings        Anesthesia Plan  ASA Score- 2     Anesthesia Type- IV sedation with anesthesia with ASA Monitors  Additional Monitors:   Airway Plan:         Plan Factors-Patient not instructed to abstain from smoking on day of procedure  Patient did not smoke on day of surgery  Induction- intravenous  Postoperative Plan-     Informed Consent- Anesthetic plan and risks discussed with patient                Lab Results   Component Value Date    HGBA1C 7 0 (H) 08/17/2018       Lab Results   Component Value Date     08/17/2018    K 4 2 08/17/2018     08/17/2018    CO2 29 08/17/2018    BUN 16 08/17/2018    CREATININE 0 57 (L) 08/17/2018    GLUF 126 (H) 08/17/2018    CALCIUM 9 4 08/17/2018    AST 16 08/17/2018    ALT 24 08/17/2018    ALKPHOS 80 08/17/2018    EGFR 102 08/17/2018       Lab Results   Component Value Date    WBC 8 20 08/17/2018    HGB 12 5 08/17/2018    HCT 37 5 08/17/2018    MCV 90 08/17/2018     08/17/2018

## 2018-09-12 NOTE — PERIOPERATIVE NURSING NOTE
ivs out  Tolerated diet  Denies pain  Passing flatus    Discharged ambulatory after discharge instructions given and verbalized an understanding of same

## 2018-09-12 NOTE — DISCHARGE INSTRUCTIONS
Colonoscopia   LO QUE NECESITA SABER:   Amaury colonoscopia es un procedimiento para examinar con un endoscopio el interior de morales colon (intestino)  Francisca amaury colonoscopia, es posible que le retiren pólipos o crecimientos de tejidos  Es normal que se sienta inflamado o que tenga molestia abdominal  Usted debería estar expulsando los gases  Si tiene hemorroides o si le removieron pólipos, usted podría presentar amaury pequeña cantidad de sangrado  INSTRUCCIONES SOBRE EL [facility]:   Busque atención médica de inmediato si:   · Usted presenta amaury cantidad dave de dunia charli brillante en jess evacuaciones intestinales  · Morales abdomen está roshan y firme y usted siente dolor intenso  · Usted tiene dificultad repentina para respirar  Pregúntele a morales [place] vitaminas y minerales son adecuados para usted  · Usted presenta sarpullido o urticaria  · Usted tiene fiebre dentro de las 24 horas después de morales procedimiento       · Usted no ha tenido amaury evacuación intestinal después de 3 días de morales procedimiento  · Usted tiene preguntas o inquietudes acerca de morales condición o cuidado  Actividad:   · No levante nada, no se esfuerce o corra  por 3 días después de morales procedimiento  · Descanse después de morales procedimiento  A usted le stern administrado medicamento para relajarse  No  maneje o tome decisiones importantes hasta el día siguiente de morales procedimiento  Regrese a jess actividades normales según le indiquen  · Alivie los gases y la incomodidad de la inflamación  acostándose en morales costado derecho con amaury almohada térmica sobre morales abdomen  Es posible que necesite caminar un poco para ayudar a eliminar los gases  Coma comidas pequeñas hasta que se alivie de la inflamación  Si a usted le removieron pólipos:  Por 7 días después de morales procedimiento:  · No  tome aspirina  · No  realice paseos largos en onel  Acuda a jess consultas de control con morales médico según le indicaron    Anote jess preguntas para que se acuerde de hacerlas torri jess visitas  © 2017 kelle Locke  Information is for End User's use only and may not be sold, redistributed or otherwise used for commercial purposes  All illustrations and images included in CareNotes® are the copyrighted property of A KARINE A M , Inc  or Isaiah Coleman  Esta información es sólo para uso en educación  Schmidt intención no es darle un consejo médico sobre enfermedades o tratamientos  Colsulte con schmidt [facility] farmacéutico antes de seguir cualquier régimen médico para saber si es seguro y efectivo para usted

## 2018-09-12 NOTE — OP NOTE
OPERATIVE REPORT  PATIENT NAME: Lynda Polk    :  1960  MRN: [id number]  Pt Location:  GI ROOM 02    SURGERY DATE: 2018    Surgeon(s) and Role:     Natalie Vincent,  - Primary    Preop Diagnosis:  Encounter for screening for malignant neoplasm of colon [Z12 11]    Post-Op Diagnosis Codes:     * Encounter for screening for malignant neoplasm of colon [Z12 11]    Procedure(s) (LRB):  COLONOSCOPY (N/A)    Specimen(s):  * No specimens in log *    Estimated Blood Loss:   Minimal    Drains:       Anesthesia Type:   IV Sedation with Anesthesia    Operative Indications:  Encounter for screening for malignant neoplasm of colon [Z12 11]  Same    Operative Findings:  Sigmoid Diverticulosis    Complications:   None    Procedure and Technique: With the patient in the left lateral position digital rectal exam was carried out there was no bone examining finger no palpable masses are present  The flexible fiberoptic colonoscope was inserted to tire length till the tip  of the scope was in the cecum  No tumors polyps ulcers erosions or  Bleeding was  seen  Diverticulosis was noted in the sigmoid colon however  after the tip of the scope in the cecum was then withdrawn while again very carefully inspecting the mucosa  No tumors polyps ulcers erosions or bleeding was seen scope was brought back to the rectum the leads were seen  Scope removed; She tolerated the procedure well     I was present for the entire procedure    Patient Disposition:  hemodynamically stable    SIGNATURE: Augusto Willingham DO  DATE: 2018  TIME: 9:48 AM

## 2018-09-12 NOTE — ANESTHESIA POSTPROCEDURE EVALUATION
Post-Op Assessment Note      CV Status:  Stable    Mental Status:  Alert and awake    Hydration Status:  Euvolemic    PONV Controlled:  Controlled    Airway Patency:  Patent    Post Op Vitals Reviewed: Yes          Staff: CRNA           BP   154/74   Temp     Pulse 84   Resp 16   SpO2 96

## 2018-09-19 VITALS — HEIGHT: 55 IN | BODY MASS INDEX: 45.36 KG/M2 | WEIGHT: 195.99 LBS

## 2018-09-24 ENCOUNTER — TRANSCRIBE ORDERS (OUTPATIENT)
Dept: ADMINISTRATIVE | Facility: HOSPITAL | Age: 58
End: 2018-09-24

## 2018-09-24 DIAGNOSIS — R10.10 UPPER ABDOMINAL PAIN: Primary | ICD-10-CM

## 2018-09-26 ENCOUNTER — HOSPITAL ENCOUNTER (OUTPATIENT)
Dept: ULTRASOUND IMAGING | Facility: HOSPITAL | Age: 58
Discharge: HOME | End: 2018-09-26
Attending: SURGERY
Payer: COMMERCIAL

## 2018-09-26 DIAGNOSIS — R10.10 UPPER ABDOMINAL PAIN: ICD-10-CM

## 2018-09-26 PROCEDURE — 76700 US EXAM ABDOM COMPLETE: CPT

## 2018-10-03 ENCOUNTER — HOSPITAL ENCOUNTER (OUTPATIENT)
Dept: RADIOLOGY | Facility: HOSPITAL | Age: 58
Discharge: HOME | End: 2018-10-03

## 2018-10-09 ENCOUNTER — ANESTHESIA EVENT (OUTPATIENT)
Dept: PERIOP | Facility: HOSPITAL | Age: 58
End: 2018-10-09
Payer: COMMERCIAL

## 2018-10-09 DIAGNOSIS — I10 ESSENTIAL HYPERTENSION: ICD-10-CM

## 2018-10-09 NOTE — ANESTHESIA PREPROCEDURE EVALUATION
Review of Systems/Medical History  Patient summary reviewed  Chart reviewed      Cardiovascular  Exercise tolerance (METS): >4,  Hyperlipidemia, Hypertension controlled,   Comment: Coreg  ,  Pulmonary  Negative pulmonary ROS Sleep apnea (likelyl  NO sleep study) ,        GI/Hepatic    Liver disease (elevated LFTs) , Chronic liver disease,        Negative  ROS        Endo/Other  Diabetes well controlled type 2 Oral agent,   Obesity  super morbid obesity   GYN  , Prior pregnancy/OB history ,          Hematology  Negative hematology ROS      Musculoskeletal  Back pain (Baclofen tx) , lumbar pain,        Neurology  Negative neurology ROS      Psychology     Chronic pain,            Physical Exam    Airway    Mallampati score: II  TM Distance: >3 FB  Neck ROM: limited     Dental       Cardiovascular  Cardiovascular exam normal    Pulmonary      Other Findings  Extremely loose lower incisor  Pt aware it may be lost      Anesthesia Plan  ASA Score- 3     Anesthesia Type- IV sedation with anesthesia with ASA Monitors  Additional Monitors:   Airway Plan:     Comment: MAGGIE likely  Loose lower incisor  Plan Factors-Patient not instructed to abstain from smoking on day of procedure  Patient did not smoke on day of surgery  Induction- intravenous  Postoperative Plan- Plan for postoperative opioid use  Informed Consent- Anesthetic plan and risks discussed with patient and sibling  I personally reviewed this patient with the CRNA  Discussed and agreed on the Anesthesia Plan with the CRNA  Krystyna Wong

## 2018-10-10 ENCOUNTER — ANESTHESIA (OUTPATIENT)
Dept: PERIOP | Facility: HOSPITAL | Age: 58
End: 2018-10-10
Payer: COMMERCIAL

## 2018-10-10 ENCOUNTER — HOSPITAL ENCOUNTER (OUTPATIENT)
Facility: HOSPITAL | Age: 58
Setting detail: OUTPATIENT SURGERY
Discharge: HOME | End: 2018-10-10
Attending: SURGERY | Admitting: SURGERY
Payer: COMMERCIAL

## 2018-10-10 VITALS
HEART RATE: 67 BPM | SYSTOLIC BLOOD PRESSURE: 148 MMHG | TEMPERATURE: 97.1 F | RESPIRATION RATE: 18 BRPM | WEIGHT: 195 LBS | OXYGEN SATURATION: 96 % | DIASTOLIC BLOOD PRESSURE: 85 MMHG | BODY MASS INDEX: 45.13 KG/M2 | HEIGHT: 55 IN

## 2018-10-10 PROBLEM — R10.13 EPIGASTRIC PAIN: Status: ACTIVE | Noted: 2018-10-10

## 2018-10-10 LAB — GLUCOSE SERPL-MCNC: 138 MG/DL (ref 70–99)

## 2018-10-10 PROCEDURE — 82948 REAGENT STRIP/BLOOD GLUCOSE: CPT

## 2018-10-10 RX ORDER — ESMOLOL HYDROCHLORIDE 10 MG/ML
INJECTION INTRAVENOUS AS NEEDED
Status: DISCONTINUED | OUTPATIENT
Start: 2018-10-10 | End: 2018-10-10 | Stop reason: SURG

## 2018-10-10 RX ORDER — PROPOFOL 10 MG/ML
INJECTION, EMULSION INTRAVENOUS AS NEEDED
Status: DISCONTINUED | OUTPATIENT
Start: 2018-10-10 | End: 2018-10-10 | Stop reason: SURG

## 2018-10-10 RX ORDER — SODIUM CHLORIDE 9 MG/ML
75 INJECTION, SOLUTION INTRAVENOUS CONTINUOUS
Status: DISCONTINUED | OUTPATIENT
Start: 2018-10-10 | End: 2018-10-10 | Stop reason: HOSPADM

## 2018-10-10 RX ORDER — LIDOCAINE HYDROCHLORIDE 10 MG/ML
INJECTION, SOLUTION INFILTRATION; PERINEURAL AS NEEDED
Status: DISCONTINUED | OUTPATIENT
Start: 2018-10-10 | End: 2018-10-10 | Stop reason: SURG

## 2018-10-10 RX ADMIN — SODIUM CHLORIDE 75 ML/HR: 9 INJECTION, SOLUTION INTRAVENOUS at 06:23

## 2018-10-10 RX ADMIN — SODIUM CHLORIDE: 9 INJECTION, SOLUTION INTRAVENOUS at 07:20

## 2018-10-10 RX ADMIN — LIDOCAINE HYDROCHLORIDE 80 MG: 10 INJECTION, SOLUTION INFILTRATION; PERINEURAL at 07:39

## 2018-10-10 RX ADMIN — PROPOFOL 100 MG: 10 INJECTION, EMULSION INTRAVENOUS at 07:39

## 2018-10-10 RX ADMIN — PROPOFOL 20 MG: 10 INJECTION, EMULSION INTRAVENOUS at 07:40

## 2018-10-10 RX ADMIN — ESMOLOL HYDROCHLORIDE 30 MG: 10 INJECTION INTRAVENOUS at 07:52

## 2018-10-10 NOTE — PERIOPERATIVE NURSING NOTE
ivs out  Tolerated diet  Eating and drinking    Discharged ambulatory after discharge instructions given and verbalized an understanding of same

## 2018-10-10 NOTE — ANESTHESIA POSTPROCEDURE EVALUATION
Post-Op Assessment Note      CV Status:  Stable    Mental Status:  Alert    Hydration Status:  Stable    PONV Controlled:  Controlled    Airway Patency:  Patent    Post Op Vitals Reviewed: Yes          Staff: CRNA           BP   142/80   Temp     Pulse  79   Resp   22   SpO2   95

## 2018-10-10 NOTE — OP NOTE
OPERATIVE REPORT  PATIENT NAME: Lainey Razo    :  1960  MRN: [id number]  Pt Location:  GI ROOM 02    SURGERY DATE: 10/10/2018    Surgeon(s) and Role:     Kev Sue DO - Primary    Preop Diagnosis:  Epigastric pain [R10 13]    Post-Op Diagnosis Codes:     * Epigastric pain [R10 13]    Procedure(s) (LRB):  EGD (N/A)    Specimen(s):  * No specimens in log *    Estimated Blood Loss:   Minimal    Drains:       Anesthesia Type:   IV Sedation with Anesthesia    Operative Indications:  Epigastric pain [R10 13]  Same    Operative Findings:  Antral erythema  Retained food    Complications:   None    Procedure and Technique: With the patient in the left lateral position the gastroscope was inserted under direct vision into the esophagus  No tumors polyps ulcers erosions or bleeding was seen  Scope readily was passed into the stomach in the greater lesser curvature carefully inspected  No lesions were seen  Linear streaks of erythema were noted in the antrum  Also retained food particles were noted in the antrum  The scope readily passed  through the pylorus into the duodenum  After the tip of the scope within the 2nd portion of the duodenum was then withdrawn while again very carefully inspecting the mucosa  No tumors polyps ulcer erosion bleeding was seen  The duodenal cap had a salt and pepper type appearance  The scope was then withdrawn back into the antrum  a J maneuver was performed and  the cardia and fundus were carefully inspected  No tumors polyps ulcers erosions or bleeding were seen and these two areas of the stomach  The scope was then brought back to the GE junction there was no evidence of distal esophagitis  The  scope was removed and she was taken to [facility] in stable condition     I was present for the entire procedure    Patient Disposition:  hemodynamically stable    SIGNATURE: Jennifer Maynard DO  DATE: October 10, 2018  TIME: 7:51 AM

## 2018-10-11 DIAGNOSIS — I10 ESSENTIAL HYPERTENSION: ICD-10-CM

## 2018-10-11 RX ORDER — LOSARTAN POTASSIUM 50 MG/1
50 TABLET ORAL DAILY
Qty: 30 TABLET | Refills: 5 | Status: SHIPPED | OUTPATIENT
Start: 2018-10-11 | End: 2018-11-23 | Stop reason: SDUPTHER

## 2018-10-11 RX ORDER — LOSARTAN POTASSIUM 50 MG/1
25 TABLET ORAL DAILY
Qty: 30 TABLET | Refills: 5 | Status: CANCELLED | OUTPATIENT
Start: 2018-10-11

## 2018-10-29 ENCOUNTER — TRANSCRIBE ORDERS (OUTPATIENT)
Dept: ADMINISTRATIVE | Facility: HOSPITAL | Age: 58
End: 2018-10-29

## 2018-10-29 DIAGNOSIS — K81.0 ACUTE CHOLECYSTITIS: Primary | ICD-10-CM

## 2018-11-23 ENCOUNTER — OFFICE VISIT (OUTPATIENT)
Dept: FAMILY MEDICINE CLINIC | Facility: CLINIC | Age: 58
End: 2018-11-23
Payer: COMMERCIAL

## 2018-11-23 VITALS
WEIGHT: 195 LBS | RESPIRATION RATE: 16 BRPM | TEMPERATURE: 98 F | HEART RATE: 88 BPM | OXYGEN SATURATION: 98 % | HEIGHT: 55 IN | DIASTOLIC BLOOD PRESSURE: 90 MMHG | SYSTOLIC BLOOD PRESSURE: 150 MMHG | BODY MASS INDEX: 45.13 KG/M2

## 2018-11-23 DIAGNOSIS — M54.41 CHRONIC LOW BACK PAIN WITH RIGHT-SIDED SCIATICA, UNSPECIFIED BACK PAIN LATERALITY: ICD-10-CM

## 2018-11-23 DIAGNOSIS — Z23 NEED FOR PNEUMOCOCCAL VACCINATION: ICD-10-CM

## 2018-11-23 DIAGNOSIS — I10 ESSENTIAL HYPERTENSION: Primary | ICD-10-CM

## 2018-11-23 DIAGNOSIS — Z12.39 SCREENING FOR BREAST CANCER: ICD-10-CM

## 2018-11-23 DIAGNOSIS — K31.84 GASTROPARESIS DIABETICORUM (HCC): ICD-10-CM

## 2018-11-23 DIAGNOSIS — E11.65 UNCONTROLLED TYPE 2 DIABETES MELLITUS WITH HYPERGLYCEMIA (HCC): ICD-10-CM

## 2018-11-23 DIAGNOSIS — E11.43 GASTROPARESIS DIABETICORUM (HCC): ICD-10-CM

## 2018-11-23 DIAGNOSIS — G89.29 CHRONIC LOW BACK PAIN WITH RIGHT-SIDED SCIATICA, UNSPECIFIED BACK PAIN LATERALITY: ICD-10-CM

## 2018-11-23 PROCEDURE — G0009 ADMIN PNEUMOCOCCAL VACCINE: HCPCS

## 2018-11-23 PROCEDURE — 99214 OFFICE O/P EST MOD 30 MIN: CPT | Performed by: FAMILY MEDICINE

## 2018-11-23 PROCEDURE — 1036F TOBACCO NON-USER: CPT | Performed by: FAMILY MEDICINE

## 2018-11-23 PROCEDURE — 90732 PPSV23 VACC 2 YRS+ SUBQ/IM: CPT

## 2018-11-23 PROCEDURE — 3008F BODY MASS INDEX DOCD: CPT | Performed by: FAMILY MEDICINE

## 2018-11-23 RX ORDER — LOSARTAN POTASSIUM 50 MG/1
50 TABLET ORAL DAILY
Qty: 30 TABLET | Refills: 5 | Status: SHIPPED | OUTPATIENT
Start: 2018-11-23 | End: 2019-05-24 | Stop reason: SDUPTHER

## 2018-11-23 RX ORDER — IBUPROFEN 600 MG/1
600 TABLET ORAL EVERY 8 HOURS PRN
Qty: 90 TABLET | Refills: 5 | Status: SHIPPED | OUTPATIENT
Start: 2018-11-23 | End: 2019-04-16 | Stop reason: SDUPTHER

## 2018-11-23 RX ORDER — METOCLOPRAMIDE 10 MG/1
10 TABLET ORAL
Qty: 90 TABLET | Refills: 5 | Status: SHIPPED | OUTPATIENT
Start: 2018-11-23 | End: 2019-04-16 | Stop reason: SDUPTHER

## 2018-11-23 NOTE — PATIENT INSTRUCTIONS
Gastroparesis diabética   LO QUE NECESITA SABER:   La gastroparesis diabética es un tipo de daño en los nervios que hace que la digestión se vuelva más lenta  Los niveles elevados de azúcar en la dunia de la diabetes pueden dañar los nervios y los tejidos de morales estómago  Morales estómago no se vacía de la forma normal  La gastroparesis también se conoce srinivasa vaciado gástrico lento  INSTRUCCIONES SOBRE [facility]:   Busque atención médica de inmediato si:   · Jess vómitos empeoran o usted vomita torri más tiempo que de [place]  · Usted orina menos que de costumbre y tiene la boca seca  · Se siente mareado y débil, o se ha desmayado  · Usted tiene dolor intenso en el estómago o abdomen  Pregúntele a morales [place] vitaminas y minerales son adecuados para usted  · Morales nivel de azúcar en la dunia es más alto o más bajo de lo recomendado por jess médicos  · Usted sigue con dolor e inflamación en el abdomen  · Usted continúa teniendo náuseas y vómitos, o no puede comer  · Usted tiene preguntas o inquietudes acerca de morales condición o cuidado  Medicamentos:  Es posible que usted necesite alguno de los siguientes:  · Medicamento para las náuseas  ayudan a calmar morales estómago y evitar que vomite  · Medicamentos para la motilidad  ayudan a que los músculos del estómago muevan los alimentos y los líquidos para que salgan más rápidamente  Estos medicamentos también pueden ayudar con la digestión de los alimentos  · [place] jess medicamentos srinivasa se le haya indicado  Consulte con morales médico si usted vasile que morales medicamento no le está ayudando o si presenta efectos secundarios  Infórmele si es alérgico a cualquier medicamento  Mantenga amaury lista actualizada de los [place], las vitaminas y los productos herbales que adrian  Incluya los siguientes datos de los medicamentos: cantidad, frecuencia y motivo de administración   Traiga con usted la lista o los envases de la píldoras a jess citas de seguimiento  Lleve la lista de los medicamentos con usted en saumya de amaury emergencia  [place] de morales síntomas:   · [place] después de comer  Como puede ayudar a digerir la comida más rápidamente  · Siga el plan de alimentación  que morales proveedor o morales dietista le proporcionaron  [place] plan alimenticio puede ayudar a aliviar jess síntomas  Las siguientes sugerencias también podrían ser de utilidad para controlar jess síntomas:     ¨ Ingiera menos grasa y [place]  Es posible que morales estómago tenga dificultad para digerir los alimentos con un alto contenido de grasa y [place]  Es probable también que usted tenga que evitar frutas y [address]  naranjas y el brócoli  [address] a 6 comidas pequeñas al día  Para morales estómago es más fácil digerir comidas más pequeñas y más frecuentes  [address] con las comidas  Morales médico podría recomendarle que ingiera alimentos líquidos, srinivasa sopa  Los alimentos líquidos son más fáciles de digerir que los sólidos  ¨ Pregunte si debe licuar la comida  Los alimentos licuados son más fáciles de digerir  Pregunte qué comidas debe licuar y cómo hacerlo correctamente  ¨ Pregunte si debería yesenia vitaminas  usted puede necesitar y cómo añadirlas a jess comidas  · No fume  La nicotina puede dañar los vasos sanguíneos, provocar amaury digestión más lenta y hacer que sea más difícil controlar morales diabetes  No use cigarrillos electrónicos o tabaco sin humo en vez de cigarrillos o para tratar de dejar de fumar  Todos estos aún contienen nicotina  Pida a morales médico información si usted fuma actualmente y [place] para dejar de hacerlo  · No consuma alcohol  El alcohol puede retardar más morales digestión  · Siga morales plan de tratamiento de la diabetes  Es posible que deba comprobar morales nivel de azúcar en la dunia con mayor frecuencia  La digestión es más lenta y los síntomas pueden empeorar cuando el nivel de azúcar en la dunia es [place]    Acuda a jess consultas de control con schmidt médico según le indicaron  Anote jess preguntas para que se acuerde de hacerlas torri jess visitas  © 2017 [address] Information is for End User's use only and may not be sold, redistributed or otherwise used for commercial purposes  All illustrations and images included in CareNotes® are the copyrighted property of A D A M , Inc  or Isaiah Coleman  Esta información es sólo para uso en educación  Schmidt intención no es darle un consejo médico sobre enfermedades o tratamientos  Colsulte con schmidt Freeda Hagan farmacéutico antes de seguir cualquier régimen médico para saber si es seguro y efectivo para usted

## 2018-11-23 NOTE — PROGRESS NOTES
Assessment/Plan:  1  Need for pneumococcal vaccination    - PNEUMOCOCCAL POLYSACCHARIDE VACCINE 23-VALENT =>1YO SQ IM    2  Essential hypertension    It is in so optimal control, patient insist she has controlled blood pressure at home, she will bring a low book of home blood pressures  She will continue and current treatment  - losartan (COZAAR) 50 mg tablet; Take 1 tablet (50 mg total) by mouth daily  Dispense: 30 tablet; Refill: 5    3  Chronic low back pain with right-sided sciatica, unspecified back pain laterality  Improve with the use of NSAID will continue same  One the main problems in this patient is obesity   - ibuprofen (MOTRIN) 600 mg tablet; Take 1 tablet (600 mg total) by mouth every 8 (eight) hours as needed for mild pain  Dispense: 90 tablet; Refill: 5      4  Uncontrolled type 2 diabetes mellitus with hyperglycemia ([address] )  As per her low book good control on home will continue with Glucophage as before  - metFORMIN (GLUCOPHAGE) 500 mg tablet; Take 1 5 tablets (750 mg total) by mouth 2 (two) times a day with meals  Dispense: 60 tablet; Refill: 5    5  Gastroparesis diabeticorum (HCC)  Empirically after EGD, a report from Dr Martha Hinojosa she had slow transit of stomach, but and no formal test was done  However patient improved with the treatment metoclopramide   - metoclopramide (REGLAN) 10 mg tablet; Take 1 tablet (10 mg total) by mouth 3 (three) times a day before meals  Dispense: 90 tablet; Refill: 5    6  Screening for breast cancer    - Mammo screening bilateral w cad; Future    No problem-specific Assessment & Plan notes found for this encounter  Diagnoses and all orders for this visit:    Need for pneumococcal vaccination  -     PNEUMOCOCCAL POLYSACCHARIDE VACCINE 23-VALENT =>1YO SQ IM          Subjective:      Patient ID: Justino Ley is a 60 y o  female  Pt here for routine followup  Diabetes   She presents for her follow-up diabetic visit  She has type 2 diabetes mellitus   Her disease course has been stable  Pertinent negatives for hypoglycemia include no sweats  Associated symptoms include blurred vision (Patient is following up with Dr Wing Holder from [facility] for macular degeneration and "wrinkles in the retina")  Pertinent negatives for diabetes include no chest pain, no fatigue, no foot paresthesias, no foot ulcerations, no polydipsia, no polyphagia, no polyuria, no visual change, no weakness and no weight loss  Hypertension   This is a chronic problem  Condition status: Patient states blood pressure is in good numbers are home with max systolic blood pressure 637 average  Associated symptoms include blurred vision (Patient is following up with Dr Wing Holder from [facility] for macular degeneration and "wrinkles in the retina")  Pertinent negatives include no chest pain, orthopnea, palpitations, peripheral edema, shortness of breath or sweats  The following portions of the patient's history were reviewed and updated as appropriate: allergies, current medications, past family history, past medical history, past social history, past surgical history and problem list     Review of Systems   Constitutional: Negative for fatigue and weight loss  Eyes: Positive for blurred vision (Patient is following up with Dr Wing Holder from [facility] for [facility] for macular degeneration and "wrinkles in the retina")  Respiratory: Negative for shortness of breath  Cardiovascular: Negative for chest pain, palpitations and orthopnea  Gastrointestinal: Positive for abdominal pain (That improves with the use of metoclopramide given by Dr Sara Felix)  Endocrine: Negative for polydipsia, polyphagia and polyuria  Musculoskeletal: Positive for back pain ( improved with the use of ibuprofen 600 mg as needed  )  Neurological: Negative for weakness           Objective:      /90 (BP Location: Left arm, Patient Position: Sitting, Cuff Size: Standard) Pulse 88   Temp 98 °F (36 7 °C) (Oral)   Resp 16   Ht 4' 5" (1 346 m)   Wt 88 5 kg (195 lb)   SpO2 98%   Breastfeeding? No   BMI 48 81 kg/m²          Physical Exam   Constitutional: She is oriented to person, place, and time  Obese patient with BMI 48 81   Cardiovascular: Normal rate, regular rhythm and normal heart sounds  Pulmonary/Chest: Effort normal and breath sounds normal    Abdominal: Soft  Bowel sounds are normal    Musculoskeletal: Normal range of motion  Neurological: She is alert and oriented to person, place, and time  She has normal reflexes  Skin: Skin is warm and dry  Psychiatric: She has a normal mood and affect   Her behavior is normal  Judgment and thought content normal

## 2018-12-17 DIAGNOSIS — I10 HYPERTENSION, UNSPECIFIED TYPE: Primary | ICD-10-CM

## 2018-12-19 RX ORDER — CARVEDILOL 12.5 MG/1
12.5 TABLET ORAL 2 TIMES DAILY WITH MEALS
Qty: 180 TABLET | Refills: 1 | Status: SHIPPED | OUTPATIENT
Start: 2018-12-19 | End: 2019-05-30 | Stop reason: SDUPTHER

## 2019-02-22 ENCOUNTER — OFFICE VISIT (OUTPATIENT)
Dept: FAMILY MEDICINE CLINIC | Facility: CLINIC | Age: 59
End: 2019-02-22
Payer: COMMERCIAL

## 2019-02-22 VITALS
TEMPERATURE: 98.7 F | HEART RATE: 88 BPM | BODY MASS INDEX: 43.74 KG/M2 | WEIGHT: 189 LBS | RESPIRATION RATE: 16 BRPM | DIASTOLIC BLOOD PRESSURE: 80 MMHG | SYSTOLIC BLOOD PRESSURE: 150 MMHG | OXYGEN SATURATION: 98 % | HEIGHT: 55 IN

## 2019-02-22 DIAGNOSIS — Z23 NEEDS FLU SHOT: ICD-10-CM

## 2019-02-22 DIAGNOSIS — R10.12 LEFT UPPER QUADRANT PAIN: ICD-10-CM

## 2019-02-22 DIAGNOSIS — E11.65 UNCONTROLLED TYPE 2 DIABETES MELLITUS WITH HYPERGLYCEMIA (HCC): Primary | ICD-10-CM

## 2019-02-22 DIAGNOSIS — I10 ESSENTIAL HYPERTENSION: ICD-10-CM

## 2019-02-22 PROCEDURE — 99214 OFFICE O/P EST MOD 30 MIN: CPT | Performed by: FAMILY MEDICINE

## 2019-02-22 PROCEDURE — 3008F BODY MASS INDEX DOCD: CPT | Performed by: FAMILY MEDICINE

## 2019-02-22 PROCEDURE — 90682 RIV4 VACC RECOMBINANT DNA IM: CPT | Performed by: FAMILY MEDICINE

## 2019-02-22 PROCEDURE — 3725F SCREEN DEPRESSION PERFORMED: CPT | Performed by: FAMILY MEDICINE

## 2019-02-22 PROCEDURE — 1036F TOBACCO NON-USER: CPT | Performed by: FAMILY MEDICINE

## 2019-02-22 PROCEDURE — 90471 IMMUNIZATION ADMIN: CPT | Performed by: FAMILY MEDICINE

## 2019-02-22 RX ORDER — DICYCLOMINE HCL 20 MG
20 TABLET ORAL EVERY 6 HOURS
Qty: 90 TABLET | Refills: 5 | Status: SHIPPED | OUTPATIENT
Start: 2019-02-22 | End: 2019-08-14 | Stop reason: ALTCHOICE

## 2019-02-22 NOTE — PROGRESS NOTES
Assessment/Plan  1  Uncontrolled type 2 diabetes mellitus with hyperglycemia ([facility] [address] )  I explained to patient the goals of blood sugar levels during fasting  and after meals  Education given verbally and with written materials  Change of life style modification again stressed  The vascular complications secondary to diabetes poor control were explained with details  The renal function protection and the prevention of major vascular disease with the use of ACEI's and/or ARB  and the use of statins respectively and exercise/diet was also discussed  - Microalbumin / creatinine urine ratio  - HEMOGLOBIN A1C W/ EAG ESTIMATION; Future    2  Essential hypertension  HTN/SUBOPTIMAL CONTROL: I discussed with patient regarding the need of compliance with medications  Exercise was encouraged as a change of life style modification  The main goal of treatment is to decrease the risk of mortality and of cardiovascular and renal morbidity  BP goal should be less than 130/80 mmHg in patients with renal disease, and less than 140/90 mmHg in all other patients  - Comprehensive metabolic panel; Future  - Lipid panel; Future    3  Left upper quadrant pain  I explained to patient that in patients who have symptoms of irritable bowel syndrome (IBS), the differential diagnosis includes celiac sprue, microscopic and collagenous colitis, atypical Crohns disease for patients with diarrhea-predominant IBS, and chronic constipation (without pain) for those with constipation-predominant IBS  If there are no warning signs, laboratory testing is warranted only if indicated by the history  The current symptoms re benign and are been treated in the needed basis  - dicyclomine (BENTYL) 20 mg tablet; Take 1 tablet (20 mg total) by mouth every 6 (six) hours  Dispense: 90 tablet; Refill: 5    4   Needs flu shot    - PREFERRED: influenza vaccine, [id number], quadrivalent, recombinant, PF, 0 5 mL, for patients 18 yr+ (FLUBLOK) Subjective:      Patient ID: Jomar De is a 60 y o  female  This is a chronic problem  The current episode started more than 1 year ago  The onset quality is sudden  The problem occurs intermittently  The problem has been waxing and waning  The pain is located in the generalized abdominal region  The pain is at a severity of 5/10  The pain is moderate  The quality of the pain is colicky  Pertinent negatives include no arthralgias, dysuria, fever or headaches  Exacerbated by: stress  The pain is relieved by nothing  He has tried nothing for the symptoms  Prior diagnostic workup includes upper endoscopy  There is no history of abdominal surgery, colon cancer, Crohn's disease, gallstones, GERD, irritable bowel syndrome, pancreatitis, PUD or ulcerative colitis  Patient is here to follow Diabetes and HTN, patient states good compliance with treatment  Denies chest pain, shortness of breath, angina, urinary problems  No exercise but follows low salt diet  The following portions of the patient's history were reviewed and updated as appropriate: allergies, current medications, past family history, past medical history, past social history, past surgical history and problem list     Review of Systems   Constitutional: Positive for fatigue and fever  HENT: Positive for congestion, ear pain, postnasal drip, rhinorrhea, sinus pain, sneezing, sore throat and voice change  Negative for hearing loss, mouth sores, tinnitus and trouble swallowing  Eyes: Positive for itching  Negative for discharge  Respiratory: Positive for cough  Negative for chest tightness, shortness of breath and wheezing  Gastrointestinal: Negative for abdominal pain  Endocrine: Negative for cold intolerance and heat intolerance  Genitourinary: Negative for difficulty urinating  Musculoskeletal: Positive for arthralgias  Neurological: Positive for dizziness and weakness   Negative for seizures, syncope, light-headedness and numbness  Psychiatric/Behavioral: Negative for agitation, behavioral problems, confusion and decreased concentration  Objective:      /80 (BP Location: Left arm, Patient Position: Sitting, Cuff Size: Standard)   Pulse 88   Temp 98 7 °F (37 1 °C) (Oral)   Resp 16   Ht 4' 5" (1 346 m)   Wt 85 7 kg (189 lb)   SpO2 98%   Breastfeeding? No   BMI 47 31 kg/m²          Physical Exam   Constitutional:   Obese patient with BMI 47 31  HENT:   Head: Normocephalic  Eyes: Pupils are equal, round, and reactive to light  EOM are normal    Neck: Neck supple  Cardiovascular: Normal rate and regular rhythm  Pulmonary/Chest: Effort normal    Abdominal: Soft  There is no hepatosplenomegaly  There is rigidity and guarding  There is no rebound and negative Sheldon's sign  Musculoskeletal: Normal range of motion  Neurological: She is alert  Skin: Skin is warm and dry  Psychiatric: She has a normal mood and affect  Her behavior is normal        BMI Counseling: Body mass index is 47 31 kg/m²  Discussed the patient's BMI with her  The BMI is above average  BMI counseling and education was provided to the patient  Nutrition recommendations include decreasing overall calorie intake

## 2019-02-22 NOTE — PATIENT INSTRUCTIONS
edu  Obesity   AMBULATORY CARE:   Obesity  is when your body mass index (BMI) is greater than 30  Your healthcare provider will use your height and weight to measure your BMI  The risks of obesity include  many health problems, such as injuries or physical disability  You may need tests to check for the following:  · Diabetes     · High blood pressure or high cholesterol     · Heart disease     · Gallbladder or liver disease     · Cancer of the colon, breast, prostate, liver, or kidney     · Sleep apnea     · Arthritis or gout  Seek care immediately if:   · You have a severe headache, confusion, or difficulty speaking  · You have weakness on one side of your body  · You have chest pain, sweating, or shortness of breath  Contact your healthcare provider if:   · You have symptoms of gallbladder or liver disease, such as pain in your upper abdomen  · You have knee or hip pain and discomfort while walking  · You have symptoms of diabetes, such as intense hunger and thirst, and frequent urination  · You have symptoms of sleep apnea, such as snoring or daytime sleepiness  · You have questions or concerns about your condition or care  Treatment for obesity  focuses on helping you lose weight to improve your health  Even a small decrease in BMI can reduce the risk for many health problems  Your healthcare provider will help you set a weight-loss goal   · Lifestyle changes  are the first step in treating obesity  These include making healthy food choices and getting regular physical activity  Your healthcare provider may suggest a weight-loss program that involves coaching, education, and therapy  · Medicine  may help you lose weight when it is used with a healthy diet and physical activity  · Surgery  can help you lose weight if you are very obese and have other health problems  There are several types of weight-loss surgery  Ask your healthcare provider for more information    Be successful losing weight:   · Set small, realistic goals  An example of a small goal is to walk for 20 minutes 5 days a week  Anther goal is to lose 5% of your body weight  · Tell friends, family members, and coworkers about your goals  and ask for their support  Ask a friend to lose weight with you, or join a weight-loss support group  · Identify foods or triggers that may cause you to overeat , and find ways to avoid them  Remove tempting high-calorie foods from your home and workplace  Place a bowl of fresh fruit on your kitchen counter  If stress causes you to eat, then find other ways to cope with stress  · Keep a diary to track what you eat and drink  Also write down how many minutes of physical activity you do each day  Weigh yourself once a week and record it in your diary  Eating changes: You will need to eat 500 to 1,000 fewer calories each day than you currently eat to lose 1 to 2 pounds a week  The following changes will help you cut calories:  · Eat smaller portions  Use small plates, no larger than 9 inches in diameter  Fill your plate half full of fruits and vegetables  Measure your food using measuring cups until you know what a serving size looks like  · Eat 3 meals and 1 or 2 snacks each day  Plan your meals in advance  Beulah Dry and eat at home most of the time  Eat slowly  · Eat fruits and vegetables at every meal   They are low in calories and high in fiber, which makes you feel full  Do not add butter, margarine, or cream sauce to vegetables  Use herbs to season steamed vegetables  · Eat less fat and fewer fried foods  Eat more baked or grilled chicken and fish  These protein sources are lower in calories and fat than red meat  Limit fast food  Dress your salads with olive oil and vinegar instead of bottled dressing  · Limit the amount of sugar you eat  Do not drink sugary beverages  Limit alcohol    Activity changes:  Physical activity is good for your body in many ways  It helps you burn calories and build strong muscles  It decreases stress and depression, and improves your mood  It can also help you sleep better  Talk to your healthcare provider before you begin an exercise program   · Exercise for at least 30 minutes 5 days a week  Start slowly  Set aside time each day for physical activity that you enjoy and that is convenient for you  It is best to do both weight training and an activity that increases your heart rate, such as walking, bicycling, or swimming  · Find ways to be more active  Do yard work and housecleaning  Walk up the stairs instead of using elevators  Spend your leisure time going to events that require walking, such as outdoor festivals or fairs  This extra physical activity can help you lose weight and keep it off  Follow up with your healthcare provider as directed: You may need to meet with a dietitian  Write down your questions so you remember to ask them during your visits  © 2017 [address] Information is for End User's use only and may not be sold, redistributed or otherwise used for commercial purposes  All illustrations and images included in CareNotes® are the copyrighted property of [employer]  or Isaiah Coleman  The above information is an  only  It is not intended as medical advice for individual conditions or treatments  Talk to your doctor, nurse or pharmacist before following any medical regimen to see if it is safe and effective for you

## 2019-02-27 DIAGNOSIS — I10 ESSENTIAL HYPERTENSION: ICD-10-CM

## 2019-02-27 DIAGNOSIS — E11.65 UNCONTROLLED TYPE 2 DIABETES MELLITUS WITH HYPERGLYCEMIA (HCC): ICD-10-CM

## 2019-02-27 RX ORDER — LOSARTAN POTASSIUM 50 MG/1
50 TABLET ORAL DAILY
Qty: 30 TABLET | Refills: 5 | Status: CANCELLED | OUTPATIENT
Start: 2019-02-27

## 2019-04-16 DIAGNOSIS — G89.29 CHRONIC LOW BACK PAIN WITH RIGHT-SIDED SCIATICA, UNSPECIFIED BACK PAIN LATERALITY: ICD-10-CM

## 2019-04-16 DIAGNOSIS — E11.43 GASTROPARESIS DIABETICORUM (HCC): ICD-10-CM

## 2019-04-16 DIAGNOSIS — K31.84 GASTROPARESIS DIABETICORUM (HCC): ICD-10-CM

## 2019-04-16 DIAGNOSIS — M54.41 CHRONIC LOW BACK PAIN WITH RIGHT-SIDED SCIATICA, UNSPECIFIED BACK PAIN LATERALITY: ICD-10-CM

## 2019-04-17 RX ORDER — IBUPROFEN 600 MG/1
600 TABLET ORAL EVERY 8 HOURS PRN
Qty: 90 TABLET | Refills: 5 | Status: SHIPPED | OUTPATIENT
Start: 2019-04-17 | End: 2019-10-02 | Stop reason: SDUPTHER

## 2019-04-17 RX ORDER — METOCLOPRAMIDE 10 MG/1
10 TABLET ORAL
Qty: 90 TABLET | Refills: 5 | Status: SHIPPED | OUTPATIENT
Start: 2019-04-17 | End: 2019-08-14 | Stop reason: ALTCHOICE

## 2019-05-17 ENCOUNTER — APPOINTMENT (OUTPATIENT)
Dept: LAB | Facility: HOSPITAL | Age: 59
End: 2019-05-17
Payer: COMMERCIAL

## 2019-05-17 ENCOUNTER — TRANSCRIBE ORDERS (OUTPATIENT)
Dept: ADMINISTRATIVE | Facility: HOSPITAL | Age: 59
End: 2019-05-17

## 2019-05-17 DIAGNOSIS — E11.65 UNCONTROLLED TYPE 2 DIABETES MELLITUS WITH HYPERGLYCEMIA (HCC): ICD-10-CM

## 2019-05-17 DIAGNOSIS — I10 ESSENTIAL HYPERTENSION: ICD-10-CM

## 2019-05-17 LAB
ALBUMIN SERPL BCP-MCNC: 4.2 G/DL (ref 3–5.2)
ALP SERPL-CCNC: 77 U/L (ref 43–122)
ALT SERPL W P-5'-P-CCNC: 19 U/L (ref 9–52)
ANION GAP SERPL CALCULATED.3IONS-SCNC: 11 MMOL/L (ref 5–14)
AST SERPL W P-5'-P-CCNC: 18 U/L (ref 14–36)
BILIRUB SERPL-MCNC: 0.3 MG/DL
BUN SERPL-MCNC: 18 MG/DL (ref 5–25)
CALCIUM SERPL-MCNC: 9.6 MG/DL (ref 8.4–10.2)
CHLORIDE SERPL-SCNC: 102 MMOL/L (ref 97–108)
CHOLEST SERPL-MCNC: 222 MG/DL
CO2 SERPL-SCNC: 27 MMOL/L (ref 22–30)
CREAT SERPL-MCNC: 0.62 MG/DL (ref 0.6–1.2)
CREAT UR-MCNC: 176 MG/DL
EST. AVERAGE GLUCOSE BLD GHB EST-MCNC: 146 MG/DL
GFR SERPL CREATININE-BSD FRML MDRD: 99 ML/MIN/1.73SQ M
GLUCOSE P FAST SERPL-MCNC: 115 MG/DL (ref 70–99)
HBA1C MFR BLD: 6.7 % (ref 4.2–6.3)
HDLC SERPL-MCNC: 48 MG/DL (ref 40–59)
LDLC SERPL CALC-MCNC: 147 MG/DL
MICROALBUMIN UR-MCNC: 61.9 MG/L (ref 0–20)
MICROALBUMIN/CREAT 24H UR: 35 MG/G CREATININE (ref 0–30)
NONHDLC SERPL-MCNC: 174 MG/DL
POTASSIUM SERPL-SCNC: 4.1 MMOL/L (ref 3.6–5)
PROT SERPL-MCNC: 7.2 G/DL (ref 5.9–8.4)
SODIUM SERPL-SCNC: 140 MMOL/L (ref 137–147)
TRIGL SERPL-MCNC: 136 MG/DL

## 2019-05-17 PROCEDURE — 82570 ASSAY OF URINE CREATININE: CPT | Performed by: FAMILY MEDICINE

## 2019-05-17 PROCEDURE — 36415 COLL VENOUS BLD VENIPUNCTURE: CPT

## 2019-05-17 PROCEDURE — 83036 HEMOGLOBIN GLYCOSYLATED A1C: CPT

## 2019-05-17 PROCEDURE — 80053 COMPREHEN METABOLIC PANEL: CPT

## 2019-05-17 PROCEDURE — 82043 UR ALBUMIN QUANTITATIVE: CPT | Performed by: FAMILY MEDICINE

## 2019-05-17 PROCEDURE — 80061 LIPID PANEL: CPT

## 2019-05-17 PROCEDURE — 3060F POS MICROALBUMINURIA REV: CPT | Performed by: FAMILY MEDICINE

## 2019-05-24 ENCOUNTER — OFFICE VISIT (OUTPATIENT)
Dept: FAMILY MEDICINE CLINIC | Facility: CLINIC | Age: 59
End: 2019-05-24
Payer: COMMERCIAL

## 2019-05-24 VITALS
BODY MASS INDEX: 43.97 KG/M2 | WEIGHT: 190 LBS | RESPIRATION RATE: 16 BRPM | HEART RATE: 71 BPM | SYSTOLIC BLOOD PRESSURE: 160 MMHG | HEIGHT: 55 IN | DIASTOLIC BLOOD PRESSURE: 80 MMHG | TEMPERATURE: 98 F | OXYGEN SATURATION: 97 %

## 2019-05-24 DIAGNOSIS — I10 ESSENTIAL HYPERTENSION: ICD-10-CM

## 2019-05-24 DIAGNOSIS — E11.65 UNCONTROLLED TYPE 2 DIABETES MELLITUS WITH HYPERGLYCEMIA (HCC): Primary | ICD-10-CM

## 2019-05-24 PROCEDURE — 3008F BODY MASS INDEX DOCD: CPT | Performed by: FAMILY MEDICINE

## 2019-05-24 PROCEDURE — 4010F ACE/ARB THERAPY RXD/TAKEN: CPT | Performed by: FAMILY MEDICINE

## 2019-05-24 PROCEDURE — 99214 OFFICE O/P EST MOD 30 MIN: CPT | Performed by: FAMILY MEDICINE

## 2019-05-24 RX ORDER — LOSARTAN POTASSIUM 100 MG/1
50 TABLET ORAL DAILY
Qty: 30 TABLET | Refills: 5 | Status: SHIPPED | OUTPATIENT
Start: 2019-05-24 | End: 2019-07-22 | Stop reason: SDUPTHER

## 2019-05-24 RX ORDER — FLUTICASONE PROPIONATE 50 MCG
1 SPRAY, SUSPENSION (ML) NASAL DAILY
Qty: 16 G | Refills: 0 | Status: SHIPPED | OUTPATIENT
Start: 2019-05-24 | End: 2019-07-26 | Stop reason: SDUPTHER

## 2019-05-30 DIAGNOSIS — I10 HYPERTENSION, UNSPECIFIED TYPE: ICD-10-CM

## 2019-05-31 RX ORDER — CARVEDILOL 12.5 MG/1
12.5 TABLET ORAL 2 TIMES DAILY WITH MEALS
Qty: 180 TABLET | Refills: 1 | Status: SHIPPED | OUTPATIENT
Start: 2019-05-31 | End: 2019-11-18 | Stop reason: SDUPTHER

## 2019-06-02 PROBLEM — E11.65 UNCONTROLLED TYPE 2 DIABETES MELLITUS WITH HYPERGLYCEMIA (HCC): Status: ACTIVE | Noted: 2019-06-02

## 2019-07-01 ENCOUNTER — CONSULT (OUTPATIENT)
Dept: FAMILY MEDICINE CLINIC | Facility: CLINIC | Age: 59
End: 2019-07-01
Payer: COMMERCIAL

## 2019-07-01 ENCOUNTER — APPOINTMENT (OUTPATIENT)
Dept: LAB | Facility: HOSPITAL | Age: 59
End: 2019-07-01
Payer: COMMERCIAL

## 2019-07-01 VITALS
SYSTOLIC BLOOD PRESSURE: 138 MMHG | RESPIRATION RATE: 20 BRPM | OXYGEN SATURATION: 96 % | TEMPERATURE: 97.3 F | HEIGHT: 55 IN | HEART RATE: 85 BPM | WEIGHT: 192.6 LBS | DIASTOLIC BLOOD PRESSURE: 80 MMHG | BODY MASS INDEX: 44.57 KG/M2

## 2019-07-01 DIAGNOSIS — Z01.818 PRE-OP EXAMINATION: ICD-10-CM

## 2019-07-01 DIAGNOSIS — Z01.818 PRE-OP EXAMINATION: Primary | ICD-10-CM

## 2019-07-01 LAB
ALBUMIN SERPL BCP-MCNC: 4.5 G/DL (ref 3–5.2)
ALP SERPL-CCNC: 80 U/L (ref 43–122)
ALT SERPL W P-5'-P-CCNC: 16 U/L (ref 9–52)
ANION GAP SERPL CALCULATED.3IONS-SCNC: 9 MMOL/L (ref 5–14)
AST SERPL W P-5'-P-CCNC: 19 U/L (ref 14–36)
BASOPHILS # BLD AUTO: 0.1 THOUSANDS/ΜL (ref 0–0.1)
BASOPHILS NFR BLD AUTO: 1 % (ref 0–1)
BILIRUB SERPL-MCNC: 0.3 MG/DL
BUN SERPL-MCNC: 18 MG/DL (ref 5–25)
CALCIUM SERPL-MCNC: 9.9 MG/DL (ref 8.4–10.2)
CHLORIDE SERPL-SCNC: 104 MMOL/L (ref 97–108)
CO2 SERPL-SCNC: 29 MMOL/L (ref 22–30)
CREAT SERPL-MCNC: 0.6 MG/DL (ref 0.6–1.2)
EOSINOPHIL # BLD AUTO: 0.3 THOUSAND/ΜL (ref 0–0.4)
EOSINOPHIL NFR BLD AUTO: 2 % (ref 0–6)
ERYTHROCYTE [DISTWIDTH] IN BLOOD BY AUTOMATED COUNT: 14.4 %
GFR SERPL CREATININE-BSD FRML MDRD: 99 ML/MIN/1.73SQ M
GLUCOSE SERPL-MCNC: 102 MG/DL (ref 70–99)
HCT VFR BLD AUTO: 38.5 % (ref 36–46)
HGB BLD-MCNC: 12.6 G/DL (ref 12–16)
LYMPHOCYTES # BLD AUTO: 2.5 THOUSANDS/ΜL (ref 0.5–4)
LYMPHOCYTES NFR BLD AUTO: 23 % (ref 25–45)
MCH RBC QN AUTO: 29.8 PG (ref 26–34)
MCHC RBC AUTO-ENTMCNC: 32.7 G/DL (ref 31–36)
MCV RBC AUTO: 91 FL (ref 80–100)
MONOCYTES # BLD AUTO: 0.6 THOUSAND/ΜL (ref 0.2–0.9)
MONOCYTES NFR BLD AUTO: 6 % (ref 1–10)
NEUTROPHILS # BLD AUTO: 7.5 THOUSANDS/ΜL (ref 1.8–7.8)
NEUTS SEG NFR BLD AUTO: 69 % (ref 45–65)
PLATELET # BLD AUTO: 306 THOUSANDS/UL (ref 150–450)
PMV BLD AUTO: 9 FL (ref 8.9–12.7)
POTASSIUM SERPL-SCNC: 4.1 MMOL/L (ref 3.6–5)
PROT SERPL-MCNC: 7.8 G/DL (ref 5.9–8.4)
RBC # BLD AUTO: 4.22 MILLION/UL (ref 4–5.2)
SODIUM SERPL-SCNC: 142 MMOL/L (ref 137–147)
WBC # BLD AUTO: 10.9 THOUSAND/UL (ref 4.5–11)

## 2019-07-01 PROCEDURE — 36415 COLL VENOUS BLD VENIPUNCTURE: CPT

## 2019-07-01 PROCEDURE — 80053 COMPREHEN METABOLIC PANEL: CPT

## 2019-07-01 PROCEDURE — 85025 COMPLETE CBC W/AUTO DIFF WBC: CPT

## 2019-07-01 PROCEDURE — 99214 OFFICE O/P EST MOD 30 MIN: CPT | Performed by: NURSE PRACTITIONER

## 2019-07-01 NOTE — PROGRESS NOTES
Assessment/Plan:    Pre-op examination  Presently clinically stable for scheduled right eye surgery pending normal CBC & CMP  Avoidance of; Aspirin, Ibuprofen, Naproxen, and other NSAIDS 5-7 days prior to surgery  To call with any changes in present status  EKG done in office with NSR  Ordered CBC & CMP labs  Diagnoses and all orders for this visit:    Pre-op examination  -     POCT ECG  -     Comprehensive metabolic panel; Future  -     CBC and differential; Future          Subjective:      Patient ID: Elis Cooper is a 60 y o  female  60year old here for pre-op clearance for right eye surgery  States she has a wrinkle in her eye and her eye doctor recommended she have surgery  Due for eye surgery this 6/8/19  The following portions of the patient's history were reviewed and updated as appropriate: allergies, current medications, past family history, past medical history, past social history, past surgical history and problem list     Review of Systems   Constitutional: Negative  Negative for appetite change, fatigue and unexpected weight change  HENT: Negative  Negative for congestion  Eyes: Positive for visual disturbance (right eye blurry vision)  Respiratory: Negative  Negative for cough, chest tightness and wheezing  Cardiovascular: Negative  Negative for chest pain, palpitations and leg swelling  Gastrointestinal: Negative  Endocrine: Negative  Negative for cold intolerance, polydipsia and polyuria  Genitourinary: Negative  Musculoskeletal: Negative  Negative for arthralgias  Skin: Negative  Negative for color change and rash  Allergic/Immunologic: Negative  Negative for immunocompromised state  Neurological: Negative  Negative for weakness and headaches  Hematological: Negative  Negative for adenopathy  Psychiatric/Behavioral: Negative            Objective:      /80   Pulse 85   Temp (!) 97 3 °F (36 3 °C) (Temporal)   Resp 20   Ht 4' 5" (1 346 m) Wt 87 4 kg (192 lb 9 6 oz)   SpO2 96%   BMI 48 21 kg/m²          Physical Exam   Constitutional: She is oriented to person, place, and time  She appears well-developed and well-nourished  No distress  HENT:   Head: Normocephalic and atraumatic  Right Ear: External ear normal    Left Ear: External ear normal    Nose: Nose normal    Mouth/Throat: Oropharynx is clear and moist    Eyes: Pupils are equal, round, and reactive to light  Conjunctivae and EOM are normal  Right eye exhibits no discharge  Left eye exhibits no discharge  Neck: Normal range of motion  Neck supple  Cardiovascular: Normal rate, regular rhythm, normal heart sounds and intact distal pulses  Pulses:       Dorsalis pedis pulses are 2+ on the right side, and 2+ on the left side  Posterior tibial pulses are 2+ on the right side, and 2+ on the left side  Pulmonary/Chest: Effort normal and breath sounds normal  No respiratory distress  She has no wheezes  She has no rales  Abdominal: Soft  Bowel sounds are normal  She exhibits no distension  There is no guarding  Musculoskeletal: Normal range of motion  Feet:   Right Foot:   Skin Integrity: Negative for ulcer, skin breakdown, erythema, warmth, callus or dry skin  Left Foot:   Skin Integrity: Negative for ulcer, skin breakdown, erythema, warmth, callus or dry skin  Lymphadenopathy:     She has no cervical adenopathy  Neurological: She is alert and oriented to person, place, and time  She has normal reflexes  Skin: Skin is warm and dry  Capillary refill takes less than 2 seconds  She is not diaphoretic  Psychiatric: She has a normal mood and affect  Her behavior is normal  Judgment and thought content normal    Nursing note and vitals reviewed  Patient's shoes and socks removed  Right Foot/Ankle   Right Foot Inspection  Skin Exam: skin normal skin not intact, no dry skin, no warmth, no callus, no erythema, no maceration, no abnormal color, no pre-ulcer, no ulcer and no callus                          Toe Exam: ROM and strength within normal limitsno swelling, no tenderness, erythema and  no right toe deformity  Sensory   Vibration: intact  Proprioception: intact   Monofilament testing: intact  Vascular  Capillary refills: < 3 seconds  The right DP pulse is 2+  The right PT pulse is 2+  Left Foot/Ankle  Left Foot Inspection  Skin Exam: skin normalskin not intact, no dry skin, no warmth, no erythema, no maceration, normal color, no pre-ulcer, no ulcer and no callus                         Toe Exam: ROM and strength within normal limitsno swelling, no tenderness, no erythema and no left toe deformity                   Sensory   Vibration: intact  Proprioception: intact  Monofilament: intact  Vascular  Capillary refills: < 3 seconds  The left DP pulse is 2+  The left PT pulse is 2+  Assign Risk Category:  No deformity present;  No loss of protective sensation;        Risk: 0

## 2019-07-02 PROCEDURE — 93000 ELECTROCARDIOGRAM COMPLETE: CPT | Performed by: NURSE PRACTITIONER

## 2019-07-02 NOTE — ASSESSMENT & PLAN NOTE
Presently clinically stable for scheduled right eye surgery pending normal CBC & CMP  Avoidance of; Aspirin, Ibuprofen, Naproxen, and other NSAIDS 5-7 days prior to surgery  To call with any changes in present status  EKG done in office with NSR  Ordered CBC & CMP labs

## 2019-07-22 ENCOUNTER — OFFICE VISIT (OUTPATIENT)
Dept: FAMILY MEDICINE CLINIC | Facility: CLINIC | Age: 59
End: 2019-07-22
Payer: COMMERCIAL

## 2019-07-22 VITALS
RESPIRATION RATE: 18 BRPM | BODY MASS INDEX: 44.66 KG/M2 | HEIGHT: 55 IN | HEART RATE: 81 BPM | WEIGHT: 193 LBS | DIASTOLIC BLOOD PRESSURE: 80 MMHG | OXYGEN SATURATION: 98 % | SYSTOLIC BLOOD PRESSURE: 150 MMHG | TEMPERATURE: 96.9 F

## 2019-07-22 DIAGNOSIS — I10 ESSENTIAL HYPERTENSION: ICD-10-CM

## 2019-07-22 DIAGNOSIS — R10.11 RUQ PAIN: ICD-10-CM

## 2019-07-22 DIAGNOSIS — E66.01 CLASS 3 SEVERE OBESITY DUE TO EXCESS CALORIES WITH SERIOUS COMORBIDITY AND BODY MASS INDEX (BMI) OF 45.0 TO 49.9 IN ADULT (HCC): ICD-10-CM

## 2019-07-22 DIAGNOSIS — E11.43 DIABETIC GASTROPARESIS ASSOCIATED WITH TYPE 2 DIABETES MELLITUS (HCC): Primary | ICD-10-CM

## 2019-07-22 DIAGNOSIS — R00.2 PALPITATIONS: ICD-10-CM

## 2019-07-22 DIAGNOSIS — R00.2 HEART PALPITATIONS: ICD-10-CM

## 2019-07-22 DIAGNOSIS — K31.84 DIABETIC GASTROPARESIS ASSOCIATED WITH TYPE 2 DIABETES MELLITUS (HCC): Primary | ICD-10-CM

## 2019-07-22 PROCEDURE — 99214 OFFICE O/P EST MOD 30 MIN: CPT | Performed by: NURSE PRACTITIONER

## 2019-07-22 PROCEDURE — 4010F ACE/ARB THERAPY RXD/TAKEN: CPT | Performed by: FAMILY MEDICINE

## 2019-07-22 RX ORDER — LOSARTAN POTASSIUM 50 MG/1
50 TABLET ORAL DAILY
Qty: 30 TABLET | Refills: 5 | Status: SHIPPED | OUTPATIENT
Start: 2019-07-22 | End: 2019-12-18 | Stop reason: SDUPTHER

## 2019-07-22 RX ORDER — OMEPRAZOLE 20 MG/1
20 CAPSULE, DELAYED RELEASE ORAL DAILY
Qty: 30 CAPSULE | Refills: 1 | Status: SHIPPED | OUTPATIENT
Start: 2019-07-22 | End: 2019-09-23 | Stop reason: SDUPTHER

## 2019-07-22 NOTE — ASSESSMENT & PLAN NOTE
-Pt verbalized taking her blood pressure at home during episodes of palpitations and being normal but her heart rate ranging from 100-118  Pt did have these episodes in the past when her mother passed away and was diagnosed with panic attacks as she had cardiac workup and cardiac catherization done in [place] and all was normal  Recent EKG from 7/2/19 with NSR and no significant changes  Ordering Holter monitor and if normal and pt symptomatic will consider referral to cardiology for further evaluation

## 2019-07-22 NOTE — PROGRESS NOTES
Assessment/Plan:    Palpitations  -Pt verbalized taking her blood pressure at home during episodes of palpitations and being normal but her heart rate ranging from 100-118  Pt did have these episodes in the past when her mother passed away and was diagnosed with panic attacks as she had cardiac workup and cardiac catherization done in [place] and all was normal  Recent EKG from 7/2/19 with NSR and no significant changes  Ordering Holter monitor and if normal and pt symptomatic will consider referral to cardiology for further evaluation  Essential hypertension  -Today blood pressure is not at goal  Advised to continue on losartan 50mg daily  Follow a diet regimen and exercise  To write down all Blood pressures x 1 week and return with log  Diabetic gastroparesis associated with type 2 diabetes mellitus ([facility] [address] )  Lab Results   Component Value Date    HGBA1C 6 7 (H) 05/17/2019       No results for input(s): POCGLU in the last 72 hours  Blood Sugar Average: Last 72 hrs:     -Patient currently on metformin 500mg BID  Pt states reglan helps with this RUQ pain she gets however gives her s/e  She self discontinued Reglan  Has used Bentyl in the past with sever abdominal pain  States that omeprazole works and has been working for her  I am prescribing patient omeprazole to trial x 1 month and follow up if s/s do not resolve  Diagnoses and all orders for this visit:    Diabetic gastroparesis associated with type 2 diabetes mellitus (HCC)    Palpitations    Heart palpitations  -     Holter monitor - 48 hour; Future    RUQ pain  -     omeprazole (PriLOSEC) 20 mg delayed release capsule; Take 1 capsule (20 mg total) by mouth daily    Essential hypertension  -     losartan (COZAAR) 50 mg tablet;  Take 1 tablet (50 mg total) by mouth daily    Class 3 severe obesity due to excess calories with serious comorbidity and body mass index (BMI) of 45 0 to 49 9 in [facility])  -     Ambulatory referral to Weight Management; Future          Subjective:      Patient ID: Da Orozco is a 60 y o  female  Cc  Per pt  left upper quadrant abdominal pain  60year old female patient had an Endoscopy/colonscopy already done  Has diabetic gastroparesis per patient  Was on reglan helped with this pain but gave her side effects  Was changed to another medication and it gave her abdominal pain in the area  States that BP at home are normal but her heart rate is usually elevated  Has had this before like 3 other times  EKG was normal 7/2/19  Abdominal Pain   This is a recurrent problem  The current episode started in the past 7 days  The onset quality is gradual  The problem occurs intermittently  The problem has been waxing and waning  The pain is located in the RUQ  The pain is at a severity of 0/10  The patient is experiencing no pain  The quality of the pain is colicky  The abdominal pain does not radiate  Associated symptoms include nausea  Pertinent negatives include no anorexia or weight loss  The pain is aggravated by eating  Relieved by: omeprazole and not eating  She has tried proton pump inhibitors for the symptoms  The treatment provided significant relief  Prior diagnostic workup includes lower endoscopy and GI consult  Her past medical history is significant for gallstones  There is no history of abdominal surgery, Crohn's disease, GERD, irritable bowel syndrome, pancreatitis, PUD or ulcerative colitis  Hypertension   This is a recurrent problem  The current episode started today  The problem has been waxing and waning since onset  The problem is uncontrolled  Associated symptoms include palpitations  Pertinent negatives include no chest pain, peripheral edema, shortness of breath or sweats  Risk factors for coronary artery disease include obesity and post-menopausal state  Past treatments include angiotensin blockers  The current treatment provides moderate improvement  Compliance problems include diet and exercise    There is no history of angina, CAD/MI, left ventricular hypertrophy or retinopathy  There is no history of chronic renal disease or a thyroid problem  Palpitations   This is a recurrent (years ago had panic attacks when her mother passed away and symptoms are similar to this) problem  Episode onset: 3 weeks ago had 4 episodes  The problem occurs intermittently  The problem has been waxing and waning  Associated symptoms include abdominal pain and nausea  Pertinent negatives include no anorexia, chest pain, congestion, fatigue, vertigo or visual change  Nothing (sitting dividing medication and watching a movie) aggravates the symptoms  She has tried relaxation and rest for the symptoms  The treatment provided significant relief  The following portions of the patient's history were reviewed and updated as appropriate: allergies, current medications, past family history, past medical history, past social history, past surgical history and problem list     Review of Systems   Constitutional: Negative  Negative for appetite change, fatigue, unexpected weight change and weight loss  HENT: Negative  Negative for congestion  Eyes: Negative  Respiratory: Negative  Negative for chest tightness, shortness of breath and wheezing  Cardiovascular: Positive for palpitations  Negative for chest pain and leg swelling  Gastrointestinal: Positive for abdominal pain and nausea  Negative for anorexia  Endocrine: Negative  Genitourinary: Negative  Musculoskeletal: Negative  Skin: Negative  Allergic/Immunologic: Negative  Neurological: Negative  Negative for vertigo  Hematological: Negative  Psychiatric/Behavioral: Negative  Objective:      /80   Pulse 81   Temp (!) 96 9 °F (36 1 °C) (Temporal)   Resp 18   Ht 4' 5" (1 346 m)   Wt 87 5 kg (193 lb)   SpO2 98%   BMI 48 31 kg/m²          Physical Exam   Constitutional: She is oriented to person, place, and time   She appears well-developed and well-nourished  Non-toxic appearance  She does not appear ill  HENT:   Head: Normocephalic and atraumatic  Mouth/Throat: Oropharynx is clear and moist    Eyes: Pupils are equal, round, and reactive to light  EOM are normal    Neck: Normal range of motion  Neck supple  Cardiovascular: Normal rate, regular rhythm and normal heart sounds  Pulmonary/Chest: Effort normal and breath sounds normal  No respiratory distress  She has no wheezes  She has no rales  Abdominal: Soft  Normal appearance and bowel sounds are normal  There is no tenderness  There is no rigidity, no rebound, no guarding, no CVA tenderness, no tenderness at McBurney's point and negative Sheldon's sign  Musculoskeletal: Normal range of motion  Lymphadenopathy:     She has no cervical adenopathy  Neurological: She is alert and oriented to person, place, and time  She has normal reflexes  Skin: Skin is warm and dry  Capillary refill takes less than 2 seconds  Psychiatric: She has a normal mood and affect   Her behavior is normal

## 2019-07-22 NOTE — ASSESSMENT & PLAN NOTE
Lab Results   Component Value Date    HGBA1C 6 7 (H) 05/17/2019       No results for input(s): POCGLU in the last 72 hours  Blood Sugar Average: Last 72 hrs:     -Patient currently on metformin 500mg BID  Pt states reglan helps with this RUQ pain she gets however gives her s/e  She self discontinued Reglan  Has used Bentyl in the past with sever abdominal pain  States that omeprazole works and has been working for her  I am prescribing patient omeprazole to trial x 1 month and follow up if s/s do not resolve

## 2019-07-22 NOTE — ASSESSMENT & PLAN NOTE
-Today blood pressure is not at goal  Advised to continue on losartan 50mg daily  Follow a diet regimen and exercise  To write down all Blood pressures x 1 week and return with log

## 2019-07-26 DIAGNOSIS — E11.65 UNCONTROLLED TYPE 2 DIABETES MELLITUS WITH HYPERGLYCEMIA (HCC): ICD-10-CM

## 2019-07-26 RX ORDER — FLUTICASONE PROPIONATE 50 MCG
1 SPRAY, SUSPENSION (ML) NASAL DAILY
Qty: 16 G | Refills: 3 | Status: SHIPPED | OUTPATIENT
Start: 2019-07-26 | End: 2020-02-25 | Stop reason: ALTCHOICE

## 2019-08-05 ENCOUNTER — HOSPITAL ENCOUNTER (OUTPATIENT)
Dept: NON INVASIVE DIAGNOSTICS | Facility: HOSPITAL | Age: 59
Discharge: HOME | End: 2019-08-05
Payer: COMMERCIAL

## 2019-08-05 DIAGNOSIS — R00.2 HEART PALPITATIONS: ICD-10-CM

## 2019-08-05 PROCEDURE — 93225 XTRNL ECG REC<48 HRS REC: CPT

## 2019-08-05 PROCEDURE — 93226 XTRNL ECG REC<48 HR SCAN A/R: CPT

## 2019-08-08 RX ORDER — NEOMYCIN SULFATE, POLYMYXIN B SULFATE AND DEXAMETHASONE 3.5; 10000; 1 MG/ML; [USP'U]/ML; MG/ML
SUSPENSION/ DROPS OPHTHALMIC
COMMUNITY
Start: 2019-07-22 | End: 2020-02-25 | Stop reason: ALTCHOICE

## 2019-08-09 PROCEDURE — 93227 XTRNL ECG REC<48 HR R&I: CPT | Performed by: INTERNAL MEDICINE

## 2019-08-14 ENCOUNTER — OFFICE VISIT (OUTPATIENT)
Dept: FAMILY MEDICINE CLINIC | Facility: CLINIC | Age: 59
End: 2019-08-14
Payer: COMMERCIAL

## 2019-08-14 VITALS
DIASTOLIC BLOOD PRESSURE: 70 MMHG | HEIGHT: 55 IN | RESPIRATION RATE: 16 BRPM | WEIGHT: 193 LBS | BODY MASS INDEX: 44.66 KG/M2 | HEART RATE: 81 BPM | OXYGEN SATURATION: 96 % | SYSTOLIC BLOOD PRESSURE: 140 MMHG | TEMPERATURE: 98.1 F

## 2019-08-14 DIAGNOSIS — Z00.01 ENCOUNTER FOR GENERAL ADULT MEDICAL EXAMINATION WITH ABNORMAL FINDINGS: Primary | ICD-10-CM

## 2019-08-14 PROCEDURE — G0439 PPPS, SUBSEQ VISIT: HCPCS | Performed by: FAMILY MEDICINE

## 2019-08-14 PROCEDURE — 1036F TOBACCO NON-USER: CPT | Performed by: FAMILY MEDICINE

## 2019-08-14 PROCEDURE — 3008F BODY MASS INDEX DOCD: CPT | Performed by: FAMILY MEDICINE

## 2019-08-14 NOTE — PROGRESS NOTES
Assessment and Plan:   1  Encounter for general adult medical examination with abnormal findings  During this visit we have a goal to personalize prevention  I discussed the patient about - Diabetes Mellitus-, the need for a life style plan and decrease the impact of current problems  Health risk assessment was discussed with patient also and the ways to stay healthier  We reviewed also the current medications, the need to avoid polypharmacy in her current treatment; also about how the chronic conditions are impacting now and later  Recommended a healthy diet and exercising frequently will help to control better patient's current chronic conditions;  Immunizations, and the need to compliance with current CDC's recommendations  Patient declined at this time advanced directives  I encouraged against the use alcohol, tobacco, recreational illegal prescribed and non-prescribed drugs, Smoking status Counseled to stop smoking for 3-10 minutes and the use of cell phone while driving, safe sex        Problem List Items Addressed This Visit     None         History of Present Illness:     Patient presents for Medicare Annual Wellness visit    Patient Care Team:  Cheng Moseley MD as PCP - General (Family Medicine)  Greer Sanabria DO as Endoscopist     Problem List:     Patient Active Problem List   Diagnosis    Essential hypertension    Encounter for general adult medical examination with abnormal findings    Hyperlipidemia    Hyperglycemia    LFT elevation    Palpitations    Chronic low back pain with right-sided sciatica    Bilateral impacted cerumen    Epigastric pain    Uncontrolled type 2 diabetes mellitus with hyperglycemia ([address] )    Pre-op examination    Diabetic gastroparesis associated with type 2 diabetes mellitus ([address] )      Past Medical and Surgical History:     Past Medical History:   Diagnosis Date    Back problem     herniated discs    Chronic pain disorder     back    Diabetes mellitus ([address] )  Diverticulosis     Hyperglycemia     Hyperlipidemia     Hypertension     Impacted cerumen     Obesity     Palpitations      Past Surgical History:   Procedure Laterality Date    CARPAL TUNNEL RELEASE Right 1983    DIAGNOSTIC LAPAROSCOPY      ESOPHAGOGASTRODUODENOSCOPY  10/2018    gastroduodenitis    PERIPHERAL ANGIOGRAM      TX COLONOSCOPY FLX DX W/COLLJ SPEC WHEN PFRMD N/A 9/12/2018    dr chowdhury, diverticulosis    TX ESOPHAGOGASTRODUODENOSCOPY TRANSORAL DIAGNOSTIC N/A 10/10/2018    Procedure: EGD;  Surgeon: Autry Jeans, DO;  Location: [facility];   Service: General      Family History:     Family History   Problem Relation Age of Onset    Kidney disease Mother     Hypertension Mother     Coronary artery disease Father         premature    Diabetes Sister     Coronary artery disease Sister     Coronary artery disease Maternal Grandfather     Diabetes Paternal Grandfather     Coronary artery disease Paternal Grandfather       Social History:     Social History     Tobacco Use   Smoking Status Never Smoker   Smokeless Tobacco Never Used     Social History     Substance and Sexual Activity   Alcohol Use No     Social History     Substance and Sexual Activity   Drug Use No      Medications and Allergies:     Current Outpatient Medications   Medication Sig Dispense Refill    carvedilol (COREG) 12 5 mg tablet Take 1 tablet (12 5 mg total) by mouth 2 (two) times a day with meals 180 tablet 1    fluticasone (FLONASE) 50 mcg/act nasal spray 1 spray into each nostril daily 16 g 3    glucose blood (FREESTYLE LITE) test strip To check blood sugar once a day 100 each 3    ibuprofen (MOTRIN) 600 mg tablet Take 1 tablet (600 mg total) by mouth every 8 (eight) hours as needed for mild pain 90 tablet 5    losartan (COZAAR) 50 mg tablet Take 1 tablet (50 mg total) by mouth daily 30 tablet 5    metFORMIN (GLUCOPHAGE) 500 mg tablet Take 1 5 tablets (750 mg total) by mouth 2 (two) times a day with meals 60 tablet 5    Multiple Vitamin (MULTIVITAMIN) tablet Take 1 tablet by mouth daily      neomycin-polymyxin-dexamethasone (MAXITROL) ophthalmic suspension       omeprazole (PriLOSEC) 20 mg delayed release capsule Take 1 capsule (20 mg total) by mouth daily 30 capsule 1     No current facility-administered medications for this visit  Allergies   Allergen Reactions    Vicodin [Hydrocodone-Acetaminophen] Angioedema    Percocet [Oxycodone-Acetaminophen] GI Intolerance    Tramadol Dizziness    Aspirin GI Intolerance      Immunizations:     Immunization History   Administered Date(s) Administered    Hep B, Adolescent or Pediatric 07/17/2017, 12/11/2017    Hep B, adult 04/09/2018    INFLUENZA 10/08/2012, 12/11/2017    Influenza, recombinant, quadrivalent,injectable, preservative free 02/22/2019    Pneumococcal Polysaccharide PPV23 11/23/2018    Td (adult), adsorbed 01/22/2018    Tdap 01/22/2018, 01/22/2018      Medicare Screening Tests and Risk Assessments:     Christie Bailey is here for her Initial Wellness visit  Health Risk Assessment:  Patient rates overall health as good  Patient feels that their physical health rating is Slightly worse  Eyesight was rated as Slightly worse  Hearing was rated as Slightly worse  Patient feels that their emotional and mental health rating is Same  Pain experienced by patient in the last 7 days has been Some  Patient's pain rating has been 9/10  Patient states that she has experienced no weight loss or gain in last 6 months  Emotional/Mental Health:  Patient has not been feeling nervous/anxious  PHQ-9 Depression Screening:    Frequency of the following problems over the past two weeks:      1  Little interest or pleasure in doing things: 0 - not at all      2  Feeling down, depressed, or hopeless: 0 - not at all  PHQ-2 Score: 0          Broken Bones/Falls:     Fall Risk Assessment:    In the past year, patient has experienced: History of falling in past year          Bladder/Bowel:  Patient has not leaked urine accidently in the last six months  Patient reports no loss of bowel control  Immunizations:  Patient has had a flu vaccination within the last year  Patient has received a pneumonia shot  Patient has not received a shingles shot  Patient has received tetanus/diphtheria shot  Home Safety:  Patient does not have trouble with stairs inside or outside of their home  Patient currently reports that there are no safety hazards present in home, working smoke alarms, working carbon monoxide detectors  Preventative Screenings:   No breast cancer screening performed, colon cancer screen completed, cholesterol screen completed, glaucoma eye exam completed,     Nutrition:  Current diet: Diabetic with servings of the following:    Medications:  Patient is currently taking over-the-counter supplements  List of OTC medications includes: Multivitamin  Patient is able to manage medications  Lifestyle Choices:  Patient reports no tobacco use  Patient has not smoked or used tobacco in the past   Patient reports no alcohol use  Patient drives a vehicle  Patient wears seat belt  Activities of Daily Living:  Can get out of bed by his or her self, able to dress self, able to make own meals, able to do own shopping, able to bathe self, can do own laundry/housekeeping, can manage own money, pay bills and track expenses    Previous Hospitalizations:  Hospitalization or ED visit in past 12 months  Number of hospitalizations within the last year: 1-2        Advanced Directives:  Patient has not decided on power of   Patient has not completed advanced directive          Preventative Screening/Counseling:      Cardiovascular:      General: Risks and Benefits Discussed and Screening Current      Counseling: Healthy Diet and Healthy Weight          Diabetes:      General: Risks and Benefits Discussed      Counseling: Healthy Diet, Healthy Weight and Improve Physical Activity          Colorectal Cancer:      General: Screening Current and Risks and Benefits Discussed          Breast Cancer:      General: Screening Current and Risks and Benefits Discussed          Cervical Cancer:      General: Screening Current and Risks and Benefits Discussed          Osteoporosis:      General: Screening Not Indicated          AAA:      General: Screening Not Indicated          Glaucoma:      General: Screening Current          HIV:      General: Screening Not Indicated          Hepatitis C:      General: Screening Not Indicated        Advanced Directives:   Patient has no living will for healthcare, does not have durable POA for healthcare, patient does not have an advanced directive  Information on ACP and/or AD provided  5 wishes given  End of life assessment reviewed with patient  Provider agrees with end of life decisions

## 2019-08-14 NOTE — PROGRESS NOTES
Assessment/Plan:    No problem-specific Assessment & Plan notes found for this encounter  There are no diagnoses linked to this encounter  Subjective:      Patient ID: Elis Cooper is a 60 y o  female  HPI    The following portions of the patient's history were reviewed and updated as appropriate: allergies, current medications, past family history, past medical history, past social history, past surgical history and problem list     Review of Systems   Constitutional: Negative for diaphoresis, fatigue, fever and unexpected weight change  Respiratory: Negative for cough, chest tightness, shortness of breath and wheezing  Cardiovascular: Negative for chest pain, palpitations and leg swelling  Gastrointestinal: Negative for abdominal pain, blood in stool, nausea and vomiting  Neurological: Negative for dizziness, syncope, light-headedness and headaches  Hematological: Does not bruise/bleed easily  Psychiatric/Behavioral: Negative for behavioral problems, self-injury and sleep disturbance  The patient is not nervous/anxious  Objective:      /70 (BP Location: Left arm, Patient Position: Sitting, Cuff Size: Standard)   Pulse 81   Temp 98 1 °F (36 7 °C) (Oral)   Resp 16   Ht 4' 4" (1 321 m)   Wt 87 5 kg (193 lb)   SpO2 96%   Breastfeeding? No   BMI 50 18 kg/m²          Physical Exam   HENT:   Nose: Nose normal    Mouth/Throat: Oropharynx is clear and moist  No oropharyngeal exudate  Eyes: Pupils are equal, round, and reactive to light  EOM are normal  Right eye exhibits no discharge  Left eye exhibits no discharge  No scleral icterus  Cardiovascular: Normal rate, regular rhythm, normal heart sounds and intact distal pulses  Exam reveals no friction rub  No murmur heard  Pulmonary/Chest: Effort normal  No respiratory distress  She has no wheezes  She has no rales  She exhibits no tenderness  Musculoskeletal: Normal range of motion  Neurological: She is alert  Skin: Skin is warm and dry  No rash noted  No erythema  Psychiatric: She has a normal mood and affect  Her behavior is normal    Nursing note and vitals reviewed

## 2019-09-23 DIAGNOSIS — R10.11 RUQ PAIN: ICD-10-CM

## 2019-09-25 RX ORDER — OMEPRAZOLE 20 MG/1
20 CAPSULE, DELAYED RELEASE ORAL DAILY
Qty: 30 CAPSULE | Refills: 1 | Status: SHIPPED | OUTPATIENT
Start: 2019-09-25 | End: 2019-11-21 | Stop reason: SDUPTHER

## 2019-10-02 DIAGNOSIS — M54.41 CHRONIC LOW BACK PAIN WITH RIGHT-SIDED SCIATICA, UNSPECIFIED BACK PAIN LATERALITY: ICD-10-CM

## 2019-10-02 DIAGNOSIS — G89.29 CHRONIC LOW BACK PAIN WITH RIGHT-SIDED SCIATICA, UNSPECIFIED BACK PAIN LATERALITY: ICD-10-CM

## 2019-10-02 RX ORDER — IBUPROFEN 600 MG/1
600 TABLET ORAL EVERY 8 HOURS PRN
Qty: 90 TABLET | Refills: 5 | Status: SHIPPED | OUTPATIENT
Start: 2019-10-02 | End: 2020-02-25 | Stop reason: SDUPTHER

## 2019-10-22 ENCOUNTER — OFFICE VISIT (OUTPATIENT)
Dept: FAMILY MEDICINE CLINIC | Facility: CLINIC | Age: 59
End: 2019-10-22
Payer: COMMERCIAL

## 2019-10-22 VITALS
RESPIRATION RATE: 16 BRPM | BODY MASS INDEX: 44.66 KG/M2 | HEIGHT: 55 IN | SYSTOLIC BLOOD PRESSURE: 150 MMHG | OXYGEN SATURATION: 97 % | DIASTOLIC BLOOD PRESSURE: 80 MMHG | HEART RATE: 84 BPM | WEIGHT: 193 LBS | TEMPERATURE: 98.1 F

## 2019-10-22 DIAGNOSIS — E11.65 UNCONTROLLED TYPE 2 DIABETES MELLITUS WITH HYPERGLYCEMIA (HCC): ICD-10-CM

## 2019-10-22 DIAGNOSIS — E66.9 OBESITY (BMI 35.0-39.9 WITHOUT COMORBIDITY): Primary | ICD-10-CM

## 2019-10-22 DIAGNOSIS — R06.81 WITNESSED EPISODE OF APNEA: ICD-10-CM

## 2019-10-22 DIAGNOSIS — Z23 NEEDS FLU SHOT: ICD-10-CM

## 2019-10-22 PROCEDURE — 90682 RIV4 VACC RECOMBINANT DNA IM: CPT | Performed by: FAMILY MEDICINE

## 2019-10-22 PROCEDURE — 3008F BODY MASS INDEX DOCD: CPT | Performed by: FAMILY MEDICINE

## 2019-10-22 PROCEDURE — 99213 OFFICE O/P EST LOW 20 MIN: CPT | Performed by: FAMILY MEDICINE

## 2019-10-22 PROCEDURE — 90471 IMMUNIZATION ADMIN: CPT | Performed by: FAMILY MEDICINE

## 2019-10-22 NOTE — ASSESSMENT & PLAN NOTE
Patient is to consult with Bariatric Surgery for consult  We discussed about the initial approach to the obese patient who desires to lose weight  Patient should diet and exercise, as recommended by the NIH Expert Panel in 1998   A combination of a reduced-calorie diet and exercise is more efficacious than either alone  Additional weight loss may be possible with some medication regimens  The initial goal of weight loss therapy (diet and exercise) is a 10% reduction in body weight over a 6-month period  After the initial 6-month period, we will reassess to determine the efficacy of the therapy, whether the patient needs to lose more weight, or whether a weight-maintenance program may be established

## 2019-10-22 NOTE — PROGRESS NOTES
Assessment/Plan:    Obesity (BMI 35 0-39 9 without comorbidity)  Patient is to consult with Bariatric Surgery for consult  We discussed about the initial approach to the obese patient who desires to lose weight  Patient should diet and exercise, as recommended by the NIH Expert Panel in 1998   A combination of a reduced-calorie diet and exercise is more efficacious than either alone  Additional weight loss may be possible with some medication regimens  The initial goal of weight loss therapy (diet and exercise) is a 10% reduction in body weight over a 6-month period  After the initial 6-month period, we will reassess to determine the efficacy of the therapy, whether the patient needs to lose more weight, or whether a weight-maintenance program may be established  Diagnoses and all orders for this visit:    Obesity (BMI 35 0-39 9 without comorbidity)  -     Ambulatory referral to Bariatric Surgery; Future    Needs flu shot  -     influenza vaccine, [id number], quadrivalent, recombinant, PF, 0 5 mL, for patients 18 yr+ (FLUBLOK)    Witnessed episode of apnea  -     Ambulatory referral to Sleep Medicine; Future    Uncontrolled type 2 diabetes mellitus with hyperglycemia (HCC)  -     glucose blood (FREESTYLE LITE) test strip; To check blood sugar once a day          Subjective:      Patient ID: Daniela Hernandez is a 60 y o  female  Patient is here complaining of gait dysfunction, abdominal pain  Patient is also here to follow up on Diabetes, back pain and hypertension  Patient states that she is in good compliance with her medications  Patient states that she does not eat a lot during the day  She states that she has been eating crackers in the morning with a cup of coffee and a bowl of soup  Patient states that she has pain if she eats anything with substance        The following portions of the patient's history were reviewed and updated as appropriate: allergies, current medications, past family history, past medical history, past social history, past surgical history and problem list     Review of Systems   Constitutional: Negative for diaphoresis, fatigue, fever and unexpected weight change  Respiratory: Negative for cough, chest tightness, shortness of breath and wheezing  Cardiovascular: Negative for chest pain, palpitations and leg swelling  Gastrointestinal: Negative for abdominal pain, blood in stool, nausea and vomiting  Neurological: Negative for dizziness, syncope, light-headedness and headaches  Hematological: Does not bruise/bleed easily  Psychiatric/Behavioral: Negative for behavioral problems, self-injury and sleep disturbance  The patient is not nervous/anxious  Objective:      /80 (BP Location: Left arm, Patient Position: Sitting, Cuff Size: Standard)   Pulse 84   Temp 98 1 °F (36 7 °C) (Oral)   Resp 16   Ht 4' 4" (1 321 m)   Wt 87 5 kg (193 lb)   SpO2 97%   Breastfeeding? No   BMI 50 18 kg/m²          Physical Exam   HENT:   Nose: Nose normal    Mouth/Throat: Oropharynx is clear and moist  No oropharyngeal exudate  Eyes: Pupils are equal, round, and reactive to light  EOM are normal  Right eye exhibits no discharge  Left eye exhibits no discharge  No scleral icterus  Cardiovascular: Normal rate, regular rhythm, normal heart sounds and intact distal pulses  Exam reveals no friction rub  No murmur heard  Pulmonary/Chest: Effort normal  No respiratory distress  She has no wheezes  She has no rales  She exhibits no tenderness  Musculoskeletal: Normal range of motion  Neurological: She is alert  Skin: Skin is warm and dry  No rash noted  No erythema  Psychiatric: She has a normal mood and affect  Her behavior is normal    Nursing note and vitals reviewed

## 2019-10-22 NOTE — PATIENT INSTRUCTIONS
Diabetes en el adulto mayor   LO QUE NECESITA SABER:   Los adultos mayores con diabetes están en riesgo de tener enfermedad cardíaca, derrame cerebral, enfermedad renal, ceguera y daño a los nervios  También podría estar en riesgo de alguna de las siguientes condiciones:  · Nutrición deficiente o bajos niveles de azúcar en la dunia    · Confusión o problemas con la memoria, la atención o para aprender cosas nuevas    · Problemas para controlar la orina o infecciones urinarias frecuentes    · Problemas con la coordinación o el equilibrio    · Caídas y lesiones    · Dolor    · Depresión    · Llagas abiertas en las piernas o los pies  INSTRUCCIONES SOBRE [facility]:   Llame al 911 en saumya de presentar lo siguiente:   · Usted tiene alguno de los siguientes signos de derrame cerebral:      ¨ Adormecimiento o caída de un lado de morales su     ¨ Debilidad en un brazo o amaury pierna    ¨ Confusión o debilidad para hablar    ¨ Mareos o dolor de cynthia intenso, o pérdida de la visión  · Usted tiene alguno de los siguientes signos de un ataque cardíaco:      ¨ Estornudos, presión, o dolor en morales pecho que dura mas de 5 minutos o regresa  ¨ Malestar o dolor en morales espalda, jose, mandíbula, abdomen, o brazo     ¨ Dificultad para respirar    ¨ Náuseas o vómito    ¨ Siente un desvanecimiento o tiene sudores fríos especialmente en el pecho o dificultad para respirar  Regrese a la pj de emergencias si:   · Usted tiene dolor abdominal intenso, o el dolor se extiende a morales espalda  Es posible que también esté vomitando  · Usted tiene dificultad para permanecer despierto o concentrarse  · Usted está temblando o sudando  · Usted tiene visión borrosa o doble  · Morales aliento huele a fruta o molly  · Morales respiración es profunda y [place], o rápida y superficial      · Morales ritmo cardíaco es acelerado y débil  · Sufre amaury caída y se lastima    Pregúntele a morales Rosia Monet vitaminas y minerales son adecuados para usted  · Tiene vómitos o diarrea  · Tiene malestar estomacal y no puede ingerir los alimentos de morales plan de comidas  · Usted se siente débil o más cansado de lo habitual      · Usted tiene [place], litzy de [place] o se irrita con facilidad  · Morales piel está charli, tibia, seca o inflamada  · Usted tiene amaury herida que no cicatriza  · Usted tiene entumecimiento en los brazos o piernas  · Usted tiene problemas para sobrellevar morales enfermedad, o se siente ansioso o deprimido  · Usted tiene problemas con la memoria  · Usted presenta cambios en morales visión  · Usted tiene preguntas o inquietudes acerca de morales condición o cuidado  Medicamentos,  se puede administrar para disminuir la cantidad de azúcar en morales dunia  También puede necesitar medicamentos para bajar morales presión arterial o colesterol, o para prevenir coágulos de dunia  Recuerde controlar los factores de la sigla AP[facility] y prevenga los problemas causados por la diabetes:   · Revise morales nivel de azúcar en la dunia srinivasa se le haya indicado  Morales médico le dirá cuándo y con qué frecuencia lo debe revisar  Morales médico también le indicará cuáles deben ser jess niveles de azúcar en la dunia antes y después de [place] comida  Usted puede necesitar revisar morales orina o dunia por la presencia de cetonas, en saumya que morales nivel esté más alto de lo recomendado  Anote jess resultados y muéstreselos a morales médico  Puede que éste utilice los resultados para realizar modificaciones en morales medicación y morales alimentación o en los horarios en que usted hace ejercicio  Pídale a morales médico más información acerca de cómo tratar un nivel de azúcar en la dunia alto o bajo  · Siga morales plan de comidas según indicaciones  Un dietista lo ayudará a hacer un plan de comidas para mantener morales nivel de azúcar en la dunia estable y asegurarse amaury nutrición adecuada  No se salte ninguna comida   Morales nivel de azúcar en la dunia puede bajar demasiado si usted ha tomado medicamento para la diabetes y no ha comido  Consulte con morales médico acerca de los programas en morales comunidad que pueden ofrecerle la entrega de comidas a domicilio  · Intente estar activo de 30 a 60 minutos keon todos los días de la [place]  La actividad física puede ayudarlo a mantener el nivel de glucosa en la dunia estable, disminuir morales riesgo de insuficiencia cardíaca y Deisy Mera a bajar de [place]  También puede ayudarlo a mejorar morales equilibrio y [place] riesgo de caídas  Colabore con morales médico para elaborar un plan de ejercicios  Pídale a un familiar o amigo que gordo ejercicio con usted  Comience lentamente y ejercite de 5 a 10 minutos a la vez  Ejemplos de actividades incluyen caminar o nadar  Incluya ejercicios para fortalecer los músculos de 2 a 3 días a la semana  Incluya entrenamiento del equilibrio de 2 a 3 veces a la semana  Actividades que ayudan a aumentar el equilibrio incluyen yoga y flo chi      · Mantenga un peso saludable  Consulte con morales médico cuánto debería pesar  El peso saludable puede ayudarle a controlar morales diabetes y a evitar amaury enfermedad cardíaca  Solicite a morales médico que le ayude a crear un plan para perder peso de amaury forma velasquez si usted tiene sobrepeso  Juntos podrán fijar metas de pérdida de peso alcanzables  · No fume  Pida información a morales médico si usted actualmente fuma y necesita ayuda para dejar de fumar  No use cigarrillos electrónicos o tabaco sin humo en vez de cigarrillos o para tratar de dejar de fumar  Todos estos aún contienen nicotina  · Controle el estrés  El estrés puede aumentar morales nivel de azúcar en la dunia  La respiración profunda, la relajación muscular y la música pueden ayudarlo a relajarse  Solicite a morales médico más información sobre estas prácticas  Otras maneras de controlar morales diabetes:   · Revise jess pies todos los días para maximo si tienen llagas  Observe todo el pie, incluyendo la planta del pie, entre y [employer] dedos  Revise si hay heridas y callos  Use un ciro para verse la planta de los pies  La piel de los pies podría estar brillante, tirante, seca o más oscura de lo normal  Coby pies también podrían estar fríos y pálidos  Pase coby mercy por encima, por debajo, por los lados y [place] dedos del pie para sentir la piel  El enrojecimiento, inflamación y calor son signos de problemas con el flujo sanguíneo que pueden conllevar a amaury úlcera en el pie  No trate de quitarse los callos usted mismo  · Use identificación de alerta médica  Use un brazalete o collar de alerta médica o lleve consigo amaury tarjeta que indique que tiene diabetes  Pregúntele a morales médico dónde conseguir estos artículos  · [place] vacunas  Usted corre un mayor riesgo de presentar enfermedades graves si usted contrae la gripe, neumonía o hepatitis  Pregúntele a morales médico si usted debe ponerse la vacuna contra la gripe, la neumonía o la hepatitis B, y cuándo debe hacerlo  · Asista a todas coby citas  Necesitará regresar para que le realicen el examen de hemoglobina A1c cada 3 meses  Tendrá que regresar por lo menos amaury vez al año para que le revisen los pies  Necesitará de un examen de la vista amaury vez al año para revisar si tiene retinopatía  También necesitará exámenes de orina anuales para revisar si hay problemas renales  Es posible que deba realizarse exámenes para detectar enfermedad cardíaca  Anote coby preguntas para que se acuerde de hacerlas torri coby visitas  · Pídale ayuda a coby familiares y amigos  Puede que necesite ayuda para comprobar morales nivel de azúcar en la dunia, inyectarse la insulina o preparar las comidas  Pídale a coby familiareas y amigos que lo ayuden con estas tareas  Hable con morales médico si no tiene a nadie que le ayude en morales hogar  Un médico puede venir a morales casa para ayudarlo con estas tareas  Acuda a coby consultas de control con morales médico según le indicaron    Necesitará regresar para que le realicen el examen de hemoglobina A1c cada 3 meses  Tendrá que regresar por lo menos amaury vez al año para que le revisen los pies  Necesitará de un examen de la vista amaury vez al año para revisar si tiene retinopatía  También necesitará exámenes de orina anuales para revisar si hay problemas renales  Es posible que deba realizarse exámenes para detectar enfermedad cardíaca  Anote jess preguntas para que se acuerde de hacerlas torri jess visitas  © 2017 [address] [employer] [employer] Information is for End User's use only and may not be sold, redistributed or otherwise used for commercial purposes  All illustrations and images included in CareNotes® are the copyrighted property of A D A M , Inc  or Isaiah Coleman  Esta información es sólo para uso en educación  Schmidt intención no es darle un consejo médico sobre enfermedades o tratamientos  Colsulte con schmidt Patrizia [place]ad[place] farmacéutico antes de seguir cualquier régimen médico para saber si es seguro y efectivo para usted

## 2019-10-28 DIAGNOSIS — E11.65 UNCONTROLLED TYPE 2 DIABETES MELLITUS WITH HYPERGLYCEMIA (HCC): ICD-10-CM

## 2019-11-18 DIAGNOSIS — I10 HYPERTENSION, UNSPECIFIED TYPE: ICD-10-CM

## 2019-11-18 RX ORDER — CARVEDILOL 12.5 MG/1
12.5 TABLET ORAL 2 TIMES DAILY WITH MEALS
Qty: 180 TABLET | Refills: 1 | Status: SHIPPED | OUTPATIENT
Start: 2019-11-18 | End: 2020-02-25 | Stop reason: SDUPTHER

## 2019-11-21 DIAGNOSIS — R10.11 RUQ PAIN: ICD-10-CM

## 2019-11-25 RX ORDER — OMEPRAZOLE 20 MG/1
20 CAPSULE, DELAYED RELEASE ORAL DAILY
Qty: 30 CAPSULE | Refills: 5 | Status: SHIPPED | OUTPATIENT
Start: 2019-11-25 | End: 2020-02-25 | Stop reason: ALTCHOICE

## 2019-12-18 DIAGNOSIS — I10 ESSENTIAL HYPERTENSION: ICD-10-CM

## 2019-12-19 PROCEDURE — 4010F ACE/ARB THERAPY RXD/TAKEN: CPT | Performed by: FAMILY MEDICINE

## 2019-12-19 RX ORDER — LOSARTAN POTASSIUM 50 MG/1
50 TABLET ORAL DAILY
Qty: 90 TABLET | Refills: 1 | Status: SHIPPED | OUTPATIENT
Start: 2019-12-19 | End: 2020-02-25 | Stop reason: SDUPTHER

## 2020-02-11 DIAGNOSIS — E11.65 UNCONTROLLED TYPE 2 DIABETES MELLITUS WITH HYPERGLYCEMIA (HCC): ICD-10-CM

## 2020-02-25 ENCOUNTER — OFFICE VISIT (OUTPATIENT)
Dept: FAMILY MEDICINE CLINIC | Facility: CLINIC | Age: 60
End: 2020-02-25
Payer: COMMERCIAL

## 2020-02-25 VITALS
HEART RATE: 78 BPM | OXYGEN SATURATION: 98 % | DIASTOLIC BLOOD PRESSURE: 90 MMHG | SYSTOLIC BLOOD PRESSURE: 158 MMHG | WEIGHT: 186 LBS | BODY MASS INDEX: 43.05 KG/M2 | TEMPERATURE: 98 F | HEIGHT: 55 IN | RESPIRATION RATE: 16 BRPM

## 2020-02-25 DIAGNOSIS — I10 ESSENTIAL HYPERTENSION: ICD-10-CM

## 2020-02-25 DIAGNOSIS — E11.65 UNCONTROLLED TYPE 2 DIABETES MELLITUS WITH HYPERGLYCEMIA (HCC): Primary | ICD-10-CM

## 2020-02-25 DIAGNOSIS — M54.41 CHRONIC LOW BACK PAIN WITH RIGHT-SIDED SCIATICA, UNSPECIFIED BACK PAIN LATERALITY: ICD-10-CM

## 2020-02-25 DIAGNOSIS — Z11.4 SCREENING FOR HUMAN IMMUNODEFICIENCY VIRUS: ICD-10-CM

## 2020-02-25 DIAGNOSIS — K76.9 LIVER LESION, RIGHT LOBE: ICD-10-CM

## 2020-02-25 DIAGNOSIS — I10 HYPERTENSION, UNSPECIFIED TYPE: ICD-10-CM

## 2020-02-25 DIAGNOSIS — G89.29 CHRONIC LOW BACK PAIN WITH RIGHT-SIDED SCIATICA, UNSPECIFIED BACK PAIN LATERALITY: ICD-10-CM

## 2020-02-25 PROCEDURE — 83036 HEMOGLOBIN GLYCOSYLATED A1C: CPT | Performed by: FAMILY MEDICINE

## 2020-02-25 PROCEDURE — 3044F HG A1C LEVEL LT 7.0%: CPT | Performed by: FAMILY MEDICINE

## 2020-02-25 PROCEDURE — 4010F ACE/ARB THERAPY RXD/TAKEN: CPT | Performed by: FAMILY MEDICINE

## 2020-02-25 PROCEDURE — 3080F DIAST BP >= 90 MM HG: CPT | Performed by: FAMILY MEDICINE

## 2020-02-25 PROCEDURE — 3077F SYST BP >= 140 MM HG: CPT | Performed by: FAMILY MEDICINE

## 2020-02-25 PROCEDURE — 1036F TOBACCO NON-USER: CPT | Performed by: FAMILY MEDICINE

## 2020-02-25 PROCEDURE — 99214 OFFICE O/P EST MOD 30 MIN: CPT | Performed by: FAMILY MEDICINE

## 2020-02-25 RX ORDER — LOSARTAN POTASSIUM 50 MG/1
50 TABLET ORAL DAILY
Qty: 90 TABLET | Refills: 1 | Status: SHIPPED | OUTPATIENT
Start: 2020-02-25 | End: 2020-11-09

## 2020-02-25 RX ORDER — IBUPROFEN 600 MG/1
600 TABLET ORAL EVERY 8 HOURS PRN
Qty: 90 TABLET | Refills: 5 | Status: SHIPPED | OUTPATIENT
Start: 2020-02-25 | End: 2020-07-21 | Stop reason: SDUPTHER

## 2020-02-25 RX ORDER — BLOOD-GLUCOSE METER
KIT MISCELLANEOUS
Qty: 1 EACH | Refills: 0 | Status: SHIPPED | OUTPATIENT
Start: 2020-02-25

## 2020-02-25 RX ORDER — CARVEDILOL 12.5 MG/1
12.5 TABLET ORAL 2 TIMES DAILY WITH MEALS
Qty: 180 TABLET | Refills: 1 | Status: SHIPPED | OUTPATIENT
Start: 2020-02-25 | End: 2020-09-15 | Stop reason: SDUPTHER

## 2020-02-25 NOTE — PROGRESS NOTES
Assessment/Plan:  1  Uncontrolled type 2 diabetes mellitus with hyperglycemia ([address] )  I explained to [place] the goals of blood sugar levels , with fasting  of 130 or less and  2 hrs after meals of 150 or less  Education given verbally and with written materials  Change Her life style modification again stressed  The vascular complications secondary to diabetes poor control were explained with details  The renal function protection and the prevention of major vascular disease with the use of  ARB's and statins respectively and exercise/diet was also discussed  Lab Results   Component Value Date/Time    HGBA1C 6 6 (A) 02/27/2020 08:03 AM    HGBA1C 6 7 (H) 05/17/2019 06:48 AM       - Comprehensive metabolic panel; Future  - Lipid panel; Future  - POCT hemoglobin A1c  - metFORMIN (GLUCOPHAGE) 500 mg tablet; Take 1 5 tablets (750 mg total) by mouth 2 (two) times a day with meals  Dispense: 180 tablet; Refill: 1  - glucose blood (FREESTYLE LITE) test strip; To check blood sugar once a day  Dispense: 100 each; Refill: 3  - glucose monitoring kit (FREESTYLE) monitoring kit; Check blood sugar twice a day  Dispense: 1 each; Refill: 0    2  Screening for human immunodeficiency virus    - HIV 1/2 AG-AB combo; Future    3  Body mass index (BMI) 45 0-49 9  We discussed about the initial approach to the obese patient who desires to lose weight  Patient should diet and exercise, as recommended by the NIH Expert Panel in 1998   A combination of a reduced-calorie diet and exercise is more efficacious than either alone  Additional weight loss may be possible with some medication regimens  The initial goal of weight loss therapy (diet and exercise) is a 10% reduction in body weight over a 6-month period  After the initial 6-month period, we will reassess to determine the efficacy of the therapy, whether the patient needs to lose more weight, or whether a weight-maintenance program may be established          4  Essential hypertension    - losartan (COZAAR) 50 mg tablet; Take 1 tablet (50 mg total) by mouth daily  Dispense: 90 tablet; Refill: 1    5  Chronic low back pain with right-sided sciatica, unspecified back pain laterality    - ibuprofen (MOTRIN) 600 mg tablet; Take 1 tablet (600 mg total) by mouth every 8 (eight) hours as needed for mild pain  Dispense: 90 tablet; Refill: 5      - carvedilol (COREG) 12 5 mg tablet; Take 1 tablet (12 5 mg total) by mouth 2 (two) times a day with meals  Dispense: 180 tablet; Refill: 1    7  Liver lesion, right lobe    - US liver; Future      No problem-specific Assessment & Plan notes found for this encounter  Diagnoses and all orders for this visit:    Uncontrolled type 2 diabetes mellitus with hyperglycemia ([facility] [address] )  -     Comprehensive metabolic panel; Future  -     Lipid panel; Future  -     POCT hemoglobin A1c  -     metFORMIN (GLUCOPHAGE) 500 mg tablet; Take 1 5 tablets (750 mg total) by mouth 2 (two) times a day with meals  -     glucose blood (FREESTYLE LITE) test strip; To check blood sugar once a day  -     glucose monitoring kit (FREESTYLE) monitoring kit; Check blood sugar twice a day    Screening for human immunodeficiency virus  -     HIV 1/2 AG-AB combo; Future    Essential hypertension  -     losartan (COZAAR) 50 mg tablet; Take 1 tablet (50 mg total) by mouth daily    Chronic low back pain with right-sided sciatica, unspecified back pain laterality  -     ibuprofen (MOTRIN) 600 mg tablet; Take 1 tablet (600 mg total) by mouth every 8 (eight) hours as needed for mild pain    Hypertension, unspecified type  -     carvedilol (COREG) 12 5 mg tablet; Take 1 tablet (12 5 mg total) by mouth 2 (two) times a day with meals    Liver lesion, right lobe  -     US liver; Future          Subjective:      Patient ID: Rusty Kaiser is a 60 y o  female  Patient is here to follow up on back pain, diabetes and hypertension        The following portions of the patient's history were reviewed and updated as appropriate: allergies, current medications, past family history, past medical history, past social history, past surgical history and problem list     Review of Systems   Constitutional: Negative for diaphoresis, fatigue, fever and unexpected weight change  Respiratory: Negative for cough, chest tightness, shortness of breath and wheezing  Cardiovascular: Negative for chest pain, palpitations and leg swelling  Gastrointestinal: Negative for abdominal pain, blood in stool and constipation  Neurological: Negative for dizziness, syncope, light-headedness and headaches  Hematological: Does not bruise/bleed easily  Psychiatric/Behavioral: Negative for behavioral problems, self-injury and sleep disturbance  The patient is not nervous/anxious  Lab Results   Component Value Date/Time    HGBA1C 6 6 (A) 02/27/2020 08:03 AM    HGBA1C 6 7 (H) 05/17/2019 06:48 AM     Objective:      /90 (BP Location: Left arm, Patient Position: Sitting, Cuff Size: Standard)   Pulse 78   Temp 98 °F (36 7 °C) (Oral)   Resp 16   Ht 4' 4" (1 321 m)   Wt 84 4 kg (186 lb)   SpO2 98%   Breastfeeding No   BMI 48 36 kg/m²          Physical Exam   HENT:   Head: Normocephalic  Eyes: Pupils are equal, round, and reactive to light  EOM are normal    Neck: Neck supple  Cardiovascular: Normal rate and regular rhythm  Pulmonary/Chest: Effort normal    Abdominal: Soft  Musculoskeletal: Normal range of motion  Neurological: She is alert  Skin: Skin is warm and dry  Psychiatric: She has a normal mood and affect  Her behavior is normal        BMI Counseling: Body mass index is 48 36 kg/m²  The BMI is above normal  Exercise recommendations include exercising 3-5 times per week

## 2020-02-27 ENCOUNTER — HOSPITAL ENCOUNTER (OUTPATIENT)
Dept: ULTRASOUND IMAGING | Facility: HOSPITAL | Age: 60
Discharge: HOME | End: 2020-02-27
Payer: COMMERCIAL

## 2020-02-27 DIAGNOSIS — K76.9 LIVER LESION, RIGHT LOBE: ICD-10-CM

## 2020-02-27 PROBLEM — R00.2 PALPITATIONS: Status: RESOLVED | Noted: 2018-07-20 | Resolved: 2020-02-27

## 2020-02-27 PROBLEM — Z00.01 ENCOUNTER FOR GENERAL ADULT MEDICAL EXAMINATION WITH ABNORMAL FINDINGS: Status: RESOLVED | Noted: 2018-07-20 | Resolved: 2020-02-27

## 2020-02-27 PROBLEM — R10.13 EPIGASTRIC PAIN: Status: RESOLVED | Noted: 2018-10-10 | Resolved: 2020-02-27

## 2020-02-27 PROBLEM — Z01.818 PRE-OP EXAMINATION: Status: RESOLVED | Noted: 2019-07-01 | Resolved: 2020-02-27

## 2020-02-27 PROBLEM — E66.9 OBESITY (BMI 35.0-39.9 WITHOUT COMORBIDITY): Status: RESOLVED | Noted: 2019-10-22 | Resolved: 2020-02-27

## 2020-02-27 PROBLEM — H61.23 BILATERAL IMPACTED CERUMEN: Status: RESOLVED | Noted: 2018-08-22 | Resolved: 2020-02-27

## 2020-02-27 PROBLEM — R73.9 HYPERGLYCEMIA: Status: RESOLVED | Noted: 2018-07-20 | Resolved: 2020-02-27

## 2020-02-27 LAB — SL AMB POCT HEMOGLOBIN AIC: 6.6 (ref ?–6.5)

## 2020-02-27 PROCEDURE — 76705 ECHO EXAM OF ABDOMEN: CPT

## 2020-02-27 NOTE — PATIENT INSTRUCTIONS
Obesity   AMBULATORY CARE:   Obesity  is when your body mass index (BMI) is greater than 30  Your healthcare provider will use your height and weight to measure your BMI  The risks of obesity include  many health problems, such as injuries or physical disability  You may need tests to check for the following:  · Diabetes     · High blood pressure or high cholesterol     · Heart disease     · Gallbladder or liver disease     · Cancer of the colon, breast, prostate, liver, or kidney     · Sleep apnea     · Arthritis or gout  Seek care immediately if:   · You have a severe headache, confusion, or difficulty speaking  · You have weakness on one side of your body  · You have chest pain, sweating, or shortness of breath  Contact your healthcare provider if:   · You have symptoms of gallbladder or liver disease, such as pain in your upper abdomen  · You have knee or hip pain and discomfort while walking  · You have symptoms of diabetes, such as intense hunger and thirst, and frequent urination  · You have symptoms of sleep apnea, such as snoring or daytime sleepiness  · You have questions or concerns about your condition or care  Treatment for obesity  focuses on helping you lose weight to improve your health  Even a small decrease in BMI can reduce the risk for many health problems  Your healthcare provider will help you set a weight-loss goal   · Lifestyle changes  are the first step in treating obesity  These include making healthy food choices and getting regular physical activity  Your healthcare provider may suggest a weight-loss program that involves coaching, education, and therapy  · Medicine  may help you lose weight when it is used with a healthy diet and physical activity  · Surgery  can help you lose weight if you are very obese and have other health problems  There are several types of weight-loss surgery  Ask your healthcare provider for more information    Be successful losing weight:   · Set small, realistic goals  An example of a small goal is to walk for 20 minutes 5 days a week  Anther goal is to lose 5% of your body weight  · Tell friends, family members, and coworkers about your goals  and ask for their support  Ask a friend to lose weight with you, or join a weight-loss support group  · Identify foods or triggers that may cause you to overeat , and find ways to avoid them  Remove tempting high-calorie foods from your home and workplace  Place a bowl of fresh fruit on your kitchen counter  If stress causes you to eat, then find other ways to cope with stress  · Keep a diary to track what you eat and drink  Also write down how many minutes of physical activity you do each day  Weigh yourself once a week and record it in your diary  Eating changes: You will need to eat 500 to 1,000 fewer calories each day than you currently eat to lose 1 to 2 pounds a week  The following changes will help you cut calories:  · Eat smaller portions  Use small plates, no larger than 9 inches in diameter  Fill your plate half full of fruits and vegetables  Measure your food using measuring cups until you know what a serving size looks like  · Eat 3 meals and 1 or 2 snacks each day  Plan your meals in advance  [employer] and eat at home most of the time  Eat slowly  · Eat fruits and vegetables at every meal   They are low in calories and high in fiber, which makes you feel full  Do not add butter, margarine, or cream sauce to vegetables  Use herbs to season steamed vegetables  · Eat less fat and fewer fried foods  Eat more baked or grilled chicken and fish  These protein sources are lower in calories and fat than red meat  Limit fast food  Dress your salads with olive oil and vinegar instead of bottled dressing  · Limit the amount of sugar you eat  Do not drink sugary beverages  Limit alcohol  Activity changes:  Physical activity is good for your body in many ways   It helps you burn calories and build strong muscles  It decreases stress and depression, and improves your mood  It can also help you sleep better  Talk to your healthcare provider before you begin an exercise program   · Exercise for at least 30 minutes 5 days a week  Start slowly  Set aside time each day for physical activity that you enjoy and that is convenient for you  It is best to do both weight training and an activity that increases your heart rate, such as walking, bicycling, or swimming  · Find ways to be more active  Do yard work and housecleaning  Walk up the stairs instead of using elevators  Spend your leisure time going to events that require walking, such as outdoor festivals or fairs  This extra physical activity can help you lose weight and keep it off  Follow up with your healthcare provider as directed: You may need to meet with a dietitian  Write down your questions so you remember to ask them during your visits  © 2017 [address] Information is for End User's use only and may not be sold, redistributed or otherwise used for commercial purposes  All illustrations and images included in CareNotes® are the copyrighted property of [employer] [facility] , [employer]  or Isaiah Coleman  The above information is an  only  It is not intended as medical advice for individual conditions or treatments  Talk to your doctor, nurse or pharmacist before following any medical regimen to see if it is safe and effective for you  Calorie Counting Diet   WHAT YOU NEED TO KNOW:   What is a calorie counting diet? It is a meal plan based on counting calories each day to reach a healthy body weight  You will need to eat fewer calories if you are trying to lose weight  Weight loss may decrease your risk for certain health problems or improve your health if you have health problems  Some of these health problems include heart disease, high blood pressure, and diabetes     What foods should I avoid? Your dietitian will tell you if you need to avoid certain foods based on your body weight and health condition  You may need to avoid high-fat foods if you are at risk for or have heart disease  You may need to eat fewer foods from the breads and starches food group if you have diabetes  How many calories are in foods? The following is a list of foods and drinks with the approximate number of calories in each  Check the food label to find the exact number of calories  A dietitian can tell you how many calories you should have from each food group each day    · Carbohydrate:      ¨ ½ of a 3-inch bagel, 1 slice of bread, or ½ of a hamburger bun or hot dog bun (80)    ¨ 1 (8-inch) flour tortilla or ½ cup of cooked rice (100)    ¨ 1 (6-inch) corn tortilla (80)    ¨ 1 (6-inch) pancake or 1 cup of bran flakes cereal (110)    ¨ ½ cup of cooked cereal (80)    ¨ ½ cup of cooked pasta (85)    ¨ 1 ounce of pretzels (100)    ¨ 3 cups of air-popped popcorn without butter or oil (80)    · Dairy:      ¨ 1 cup of skim or 1% milk (90)    ¨ 1 cup of 2% milk (120)    ¨ 1 cup of whole milk (160)    ¨ 1 cup of 2% chocolate milk (220)    ¨ 1 ounce of low-fat cheese with 3 grams of fat per ounce (70)    ¨ 1 ounce of cheddar cheese (114)    ¨ ½ cup of 1% fat cottage cheese (80)    ¨ 1 cup of plain or sugar-free, fat-free yogurt (90)    · Protein foods:      ¨ 3 ounces of fish (not breaded or fried) (95)    ¨ 3 ounces of breaded, fried fish (195)    ¨ ¾ cup of tuna canned in water (105)    ¨ 3 ounces of chicken breast without skin (105)    ¨ 1 fried chicken breast with skin (350)    ¨ ¼ cup of fat free egg substitute (40)    ¨ 1 large egg (75)    ¨ 3 ounces of lean beef or pork (165)    ¨ 3 ounces of fried pork chop or ham (185)    ¨ ½ cup of cooked dried beans, such as kidney, holt, lentils, or navy (115)    ¨ 3 ounces of bologna or lunch meat (225)    ¨ 2 links of breakfast sausage (140)    · Vegetables:      ¨ ½ cup of sliced mushrooms (10)    ¨ 1 cup of salad greens, such as lettuce, spinach, or hermes (15)    ¨ ½ cup of steamed asparagus (20)    ¨ ½ cup of cooked summer squash, zucchini squash, or green or wax beans (25)    ¨ 1 cup of broccoli or cauliflower florets, or 1 medium tomato (25)    ¨ 1 large raw carrot or ½ cup of cooked carrots (40)    ¨ ? of a medium cucumber or 1 stalk of celery (5)    ¨ 1 small baked potato (160)    ¨ 1 cup of breaded, fried vegetables (230)    · Fruit:      ¨ 1 (6-inch) banana (55)     ¨ ½ of a 4-inch grapefruit (55)    ¨ 15 grapes (60)    ¨ 1 medium orange or apple (70)    ¨ 1 large peach (65)    ¨ 1 cup of fresh pineapple chunks (75)    ¨ 1 cup of melon cubes (50)    ¨ 1¼ cups of whole strawberries (45)    ¨ ½ cup of fruit canned in juice (55)    ¨ ½ cup of fruit canned in heavy syrup (110)    ¨ ?  cup of raisins (130)    ¨ ½ cup of unsweetened fruit juice (60)    ¨ ½ cup of grape, cranberry, or prune juice (90)    · Fat:      ¨ 10 peanuts or 2 teaspoons of peanut butter (55)    ¨ 2 tablespoons of avocado or 1 tablespoon of regular salad dressing (45)    ¨ 2 slices of broussard (90)    ¨ 1 teaspoon of oil, such as safflower, canola, corn, or olive oil (45)    ¨ 2 teaspoons of low-fat margarine, or 1 tablespoon of low-fat mayonnaise (50)    ¨ 1 teaspoon of regular margarine (40)    ¨ 1 tablespoon of regular mayonnaise (135)    ¨ 1 tablespoon of cream cheese or 2 tablespoons of low-fat cream cheese (45)    ¨ 2 tablespoons of vegetable shortening (215)    · Dessert and sweets:      ¨ 8 animal crackers or 5 vanilla wafers (80)    ¨ 1 frozen fruit juice bar (80)    ¨ ½ cup of ice milk or low-fat frozen yogurt (90)    ¨ ½ cup of sherbet or sorbet (125)    ¨ ½ cup of sugar-free pudding or custard (60)    ¨ ½ cup of ice cream (140)    ¨ ½ cup of pudding or custard (175)    ¨ 1 (2-inch) square chocolate brownie (185)    · Combination foods:      ¨ Bean burrito made with an 8-inch tortilla, without cheese (275)    ¨ Chicken breast sandwich with lettuce and tomato (325)    ¨ 1 cup of chicken noodle soup (60)    ¨ 1 beef taco (175)    ¨ Regular hamburger with lettuce and tomato (310)    ¨ Regular cheeseburger with lettuce and tomato (410)     ¨ ¼ of a 12-inch cheese pizza (280)    ¨ Fried fish sandwich with lettuce and tomato (425)    ¨ Hot dog and bun (275)    ¨ 1½ cups of macaroni and cheese (310)    ¨ Taco salad with a fried tortilla shell (870)    · Low-calorie foods:      ¨ 1 tablespoon of ketchup or 1 tablespoon of fat free sour cream (15)    ¨ 1 teaspoon of mustard (5)    ¨ ¼ cup of salsa (20)    ¨ 1 large dill pickle (15)    ¨ 1 tablespoon of fat free salad dressing (10)    ¨ 2 teaspoons of low-sugar, light jam or jelly, or 1 tablespoon of sugar-free syrup (15)    ¨ 1 sugar-free popsicle (15)    ¨ 1 cup of club soda, seltzer water, or diet soda (0)  CARE AGREEMENT:   You have the right to help plan your care  Discuss treatment options with your caregivers to decide what care you want to receive  You always have the right to refuse treatment  The above information is an  only  It is not intended as medical advice for individual conditions or treatments  Talk to your doctor, nurse or pharmacist before following any medical regimen to see if it is safe and effective for you  © 2017 [address] Information is for End User's use only and may not be sold, redistributed or otherwise used for commercial purposes  All illustrations and images included in CareNotes® are the copyrighted property of [employer]  or Isaiah Coleman

## 2020-03-02 NOTE — RESULT ENCOUNTER NOTE
Please call patient the findings in the ultrasound are similar to previous study of 2012  Enlarged liver with fatty liver, a liver cyst similar size from before  Gallstones without complications    Continue efforts of losing weight and control diabetes

## 2020-06-08 DIAGNOSIS — Z12.31 ENCOUNTER FOR SCREENING MAMMOGRAM FOR MALIGNANT NEOPLASM OF BREAST: Primary | ICD-10-CM

## 2020-07-21 DIAGNOSIS — G89.29 CHRONIC LOW BACK PAIN WITH RIGHT-SIDED SCIATICA, UNSPECIFIED BACK PAIN LATERALITY: ICD-10-CM

## 2020-07-21 DIAGNOSIS — M54.41 CHRONIC LOW BACK PAIN WITH RIGHT-SIDED SCIATICA, UNSPECIFIED BACK PAIN LATERALITY: ICD-10-CM

## 2020-07-22 RX ORDER — IBUPROFEN 600 MG/1
600 TABLET ORAL EVERY 8 HOURS PRN
Qty: 90 TABLET | Refills: 5 | Status: SHIPPED | OUTPATIENT
Start: 2020-07-22 | End: 2020-12-15

## 2020-08-19 ENCOUNTER — DOCTOR'S OFFICE (OUTPATIENT)
Dept: URBAN - METROPOLITAN AREA CLINIC 136 | Facility: CLINIC | Age: 60
Setting detail: OPHTHALMOLOGY
End: 2020-08-19
Payer: MEDICARE

## 2020-08-19 ENCOUNTER — RX ONLY (RX ONLY)
Age: 60
End: 2020-08-19

## 2020-08-19 DIAGNOSIS — E11.9: ICD-10-CM

## 2020-08-19 DIAGNOSIS — H35.371: ICD-10-CM

## 2020-08-19 DIAGNOSIS — H43.821: ICD-10-CM

## 2020-08-19 DIAGNOSIS — H43.811: ICD-10-CM

## 2020-08-19 LAB
LEFT EYE DIABETIC RETINOPATHY: NORMAL
RIGHT EYE DIABETIC RETINOPATHY: NORMAL

## 2020-08-19 PROCEDURE — 92004 COMPRE OPH EXAM NEW PT 1/>: CPT | Performed by: OPHTHALMOLOGY

## 2020-08-19 PROCEDURE — 92134 CPTRZ OPH DX IMG PST SGM RTA: CPT | Performed by: OPHTHALMOLOGY

## 2020-08-19 ASSESSMENT — REFRACTION_CURRENTRX
OS_AXIS: 156
OS_CYLINDER: -2.75
OD_OVR_VA: 20/
OD_CYLINDER: -1.50
OD_SPHERE: -5.50
OS_SPHERE: -5.25
OS_OVR_VA: 20/
OD_AXIS: 024

## 2020-08-19 ASSESSMENT — VISUAL ACUITY
OD_BCVA: 20/20-1
OS_BCVA: 20/30

## 2020-08-19 ASSESSMENT — CONFRONTATIONAL VISUAL FIELD TEST (CVF)
OS_FINDINGS: FULL
OD_FINDINGS: FULL

## 2020-08-28 ENCOUNTER — OFFICE VISIT (OUTPATIENT)
Dept: FAMILY MEDICINE CLINIC | Facility: CLINIC | Age: 60
End: 2020-08-28
Payer: COMMERCIAL

## 2020-08-28 VITALS
BODY MASS INDEX: 43.97 KG/M2 | WEIGHT: 190 LBS | OXYGEN SATURATION: 97 % | RESPIRATION RATE: 16 BRPM | SYSTOLIC BLOOD PRESSURE: 130 MMHG | HEIGHT: 55 IN | TEMPERATURE: 98 F | DIASTOLIC BLOOD PRESSURE: 80 MMHG | HEART RATE: 78 BPM

## 2020-08-28 DIAGNOSIS — E11.65 UNCONTROLLED TYPE 2 DIABETES MELLITUS WITH HYPERGLYCEMIA (HCC): ICD-10-CM

## 2020-08-28 DIAGNOSIS — Z00.01 ENCOUNTER FOR GENERAL ADULT MEDICAL EXAMINATION WITH ABNORMAL FINDINGS: Primary | ICD-10-CM

## 2020-08-28 DIAGNOSIS — I10 ESSENTIAL HYPERTENSION: ICD-10-CM

## 2020-08-28 LAB — SL AMB POCT HEMOGLOBIN AIC: 6.8 (ref ?–6.5)

## 2020-08-28 PROCEDURE — 3044F HG A1C LEVEL LT 7.0%: CPT | Performed by: FAMILY MEDICINE

## 2020-08-28 PROCEDURE — G0439 PPPS, SUBSEQ VISIT: HCPCS | Performed by: FAMILY MEDICINE

## 2020-08-28 PROCEDURE — 3008F BODY MASS INDEX DOCD: CPT | Performed by: FAMILY MEDICINE

## 2020-08-28 PROCEDURE — 2022F DILAT RTA XM EVC RTNOPTHY: CPT | Performed by: FAMILY MEDICINE

## 2020-08-28 PROCEDURE — 99214 OFFICE O/P EST MOD 30 MIN: CPT | Performed by: FAMILY MEDICINE

## 2020-08-28 PROCEDURE — 3079F DIAST BP 80-89 MM HG: CPT | Performed by: FAMILY MEDICINE

## 2020-08-28 PROCEDURE — 1036F TOBACCO NON-USER: CPT | Performed by: FAMILY MEDICINE

## 2020-08-28 PROCEDURE — 83036 HEMOGLOBIN GLYCOSYLATED A1C: CPT | Performed by: FAMILY MEDICINE

## 2020-08-28 PROCEDURE — 3075F SYST BP GE 130 - 139MM HG: CPT | Performed by: FAMILY MEDICINE

## 2020-08-28 RX ORDER — ATORVASTATIN CALCIUM 40 MG/1
40 TABLET, FILM COATED ORAL DAILY
Qty: 30 TABLET | Refills: 5 | Status: SHIPPED | OUTPATIENT
Start: 2020-08-28 | End: 2021-01-18 | Stop reason: SDUPTHER

## 2020-08-28 NOTE — PROGRESS NOTES
Assessment and Plan:   1  Encounter for general adult medical examination with abnormal findings  During this visit we have a goal to personalize prevention  I discussed the patient about Diabetes Mellitus- Hypertension- Obesity, the need for a life style plan and decrease the impact of current problems  Health risk assessment was discussed with patient also and the ways to stay healthier  We reviewed also the current medications, the need to avoid polypharmacy in her current treatment; also about how the chronic conditions are impacting now and later  Recommended a healthy diet and exercising frequently will help to control better patient's current chronic conditions;  Immunizations, and the need to compliance with current CDC's recommendations  Patient declined at this time advanced directives  I encouraged against the use alcohol, tobacco, recreational illegal prescribed and non-prescribed drugs  About smoking: recommended avoid exposure to second hand smoking  Avoid vaping  If  having casual sex, use protection  Do not drive and drink  Do not use cell phones while you are driving  Dispose all old medication  Keep medication safe and out of reach of kids  Problem List Items Addressed This Visit     None           Preventive health issues were discussed with patient, and age appropriate screening tests were ordered as noted in patient's After Visit Summary  Personalized health advice and appropriate referrals for health education or preventive services given if needed, as noted in patient's After Visit Summary       History of Present Illness:     Patient presents for Medicare Annual Wellness visit    Patient Care Team:  Mj Arteaga MD as PCP - General (Family Medicine)  Beti Whatley DO as Endoscopist     Problem List:     Patient Active Problem List   Diagnosis    Essential hypertension    Hyperlipidemia    LFT elevation    Chronic low back pain with right-sided sciatica    Uncontrolled type 2 diabetes mellitus with hyperglycemia ([facility] )    Diabetic gastroparesis associated with type 2 diabetes mellitus ([place] [address] )    Body mass index (BMI) 45 0-49 9, adult [facility])      Past Medical and Surgical History:     Past Medical History:   Diagnosis Date    Back problem     herniated discs    Chronic pain disorder     back    Diabetes mellitus ([facility] )     Diverticulosis     Hyperglycemia     Hyperlipidemia     Hypertension     Impacted cerumen     Obesity     Palpitations      Past Surgical History:   Procedure Laterality Date    CARPAL TUNNEL RELEASE Right 1983    DIAGNOSTIC LAPAROSCOPY      ESOPHAGOGASTRODUODENOSCOPY  10/2018    gastroduodenitis    PERIPHERAL ANGIOGRAM      ND COLONOSCOPY FLX DX W/COLLJ SPEC WHEN PFRMD N/A 9/12/2018    dr chowdhury, diverticulosis    ND ESOPHAGOGASTRODUODENOSCOPY TRANSORAL DIAGNOSTIC N/A 10/10/2018    Procedure: EGD;  Surgeon: Vera Polk DO;  Location: [address] [place] [address] GI LAB;   Service: General      Family History:     Family History   Problem Relation Age of Onset    Kidney disease Mother     Hypertension Mother     Coronary artery disease Father         premature    Diabetes Sister     Coronary artery disease Sister     Coronary artery disease Maternal Grandfather     Diabetes Paternal Grandfather     Coronary artery disease Paternal Grandfather       Social History:        Social History     Socioeconomic History    Marital status:      Spouse name: None    Number of children: None    Years of education: None    Highest education level: None   Occupational History    None   Social Needs    Financial resource strain: None    Food insecurity     Worry: None     Inability: None    Transportation needs     Medical: None     Non-medical: None   Tobacco Use    Smoking status: Never Smoker    Smokeless tobacco: Never Used   Substance and Sexual Activity    Alcohol use: No    Drug use: No    Sexual activity: Yes     Partners: Male   Lifestyle    Physical activity     Days per week: None     Minutes per session: None    Stress: None   Relationships    Social connections     Talks on phone: None     Gets together: None     Attends [employer] service: None     Active member of club or organization: None     Attends meetings of clubs or organizations: None     Relationship status: None    Intimate partner violence     Fear of current or ex partner: None     Emotionally abused: None     Physically abused: None     Forced sexual activity: None   Other Topics Concern    None   Social History Narrative    None      Medications and Allergies:     Current Outpatient Medications   Medication Sig Dispense Refill    carvedilol (COREG) 12 5 mg tablet Take 1 tablet (12 5 mg total) by mouth 2 (two) times a day with meals 180 tablet 1    glucose blood (FREESTYLE LITE) test strip To check blood sugar once a day 100 each 3    glucose monitoring kit (FREESTYLE) monitoring kit Check blood sugar twice a day 1 each 0    ibuprofen (MOTRIN) 600 mg tablet Take 1 tablet (600 mg total) by mouth every 8 (eight) hours as needed for mild pain 90 tablet 5    losartan (COZAAR) 50 mg tablet Take 1 tablet (50 mg total) by mouth daily 90 tablet 1    metFORMIN (GLUCOPHAGE) 500 mg tablet Take 1 5 tablets (750 mg total) by mouth 2 (two) times a day with meals 180 tablet 1     No current facility-administered medications for this visit        Allergies   Allergen Reactions    Vicodin [Hydrocodone-Acetaminophen] Angioedema    Percocet [Oxycodone-Acetaminophen] GI Intolerance    Tramadol Dizziness    Aspirin GI Intolerance      Immunizations:     Immunization History   Administered Date(s) Administered    Hep B, Adolescent or Pediatric 07/17/2017, 12/11/2017    Hep B, adult 04/09/2018    INFLUENZA 10/08/2012, 12/11/2017    Influenza, recombinant, quadrivalent,injectable, preservative free 02/22/2019, 10/22/2019    Pneumococcal Polysaccharide PPV23 11/23/2018    Td (adult), adsorbed 01/22/2018    Tdap 01/22/2018, 01/22/2018      Health Maintenance:         Topic Date Due    MAMMOGRAM  1960    Cervical Cancer Screening  06/23/1980    Hepatitis C Screening  Completed         Topic Date Due    Influenza Vaccine  07/01/2020      Medicare Health Risk Assessment:     /80 (BP Location: Left arm, Patient Position: Sitting, Cuff Size: Standard)   Pulse 78   Temp 98 °F (36 7 °C) (Temporal)   Resp 16   Ht 4' 4" (1 321 m)   Wt 86 2 kg (190 lb)   SpO2 97%   Breastfeeding No   BMI 49 40 kg/m²      Rhianna Patel is here for her Subsequent Wellness visit  Last Medicare Wellness visit information reviewed, patient interviewed and updates made to the record today  Health Risk Assessment:   Patient rates overall health as fair  Patient feels that their physical health rating is slightly worse  Eyesight was rated as same  Hearing was rated as same  Patient feels that their emotional and mental health rating is same  Pain experienced in the last 7 days has been some  Patient's pain rating has been 4/10  Patient states that she has experienced no weight loss or gain in last 6 months  Fall Risk Screening: In the past year, patient has experienced: no history of falling in past year      Urinary Incontinence Screening:   Patient has not leaked urine accidently in the last six months  Home Safety:  Patient does not have trouble with stairs inside or outside of their home  Patient has working smoke alarms and has working carbon monoxide detector  Home safety hazards include: none  Nutrition:   Current diet is Diabetic  Medications:   Patient is not currently taking any over-the-counter supplements  Patient is able to manage medications  Activities of Daily Living (ADLs)/Instrumental Activities of Daily Living (IADLs):   Walk and transfer into and out of bed and chair?: Yes  Dress and groom yourself?: Yes    Bathe or shower yourself?: Yes    Feed yourself?  Yes  Do your laundry/housekeeping?: Yes  Manage your money, pay your bills and track your expenses?: Yes  Make your own meals?: Yes    Do your own shopping?: Yes    Previous Hospitalizations:   Any hospitalizations or ED visits within the last 12 months?: No      Advance Care Planning:   Living will: No    Durable POA for healthcare: No    Advanced directive: No    Advanced directive counseling given: Yes    Five wishes given: Yes    Patient declined ACP directive: No    End of Life Decisions reviewed with patient: Yes    Provider agrees with end of life decisions: Yes      Cognitive Screening:   Provider or family/friend/caregiver concerned regarding cognition?: No    PREVENTIVE SCREENINGS      Cardiovascular Screening:    General: Screening Not Indicated and History Lipid Disorder      Diabetes Screening:     General: Screening Not Indicated and History Diabetes      Colorectal Cancer Screening:     General: Screening Current      Breast Cancer Screening:     General: Risks and Benefits Discussed    Due for: Mammogram        Cervical Cancer Screening:    General: Risks and Benefits Discussed    Due for: Cervical Pap Smear      Osteoporosis Screening:    General: Screening Not Indicated      Abdominal Aortic Aneurysm (AAA) Screening:        General: Screening Not Indicated      Lung Cancer Screening:     General: Screening Not Indicated      Hepatitis C Screening:    General: Screening Current      Jeannie Vásquez MD

## 2020-08-28 NOTE — PROGRESS NOTES
Assessment/Plan:  1  Uncontrolled type 2 diabetes mellitus with hyperglycemia ([address] )  I explained to [place] the goals of blood sugar levels , with fasting  of 130 or less and  2 hrs after meals of 150 or less  Education given verbally and with written materials  Change Her life style modification again stressed  The vascular complications secondary to diabetes poor control were explained with details  The renal function protection and the prevention of major vascular disease with the use of  ARB's and statins respectively and exercise/diet was also discussed  - Microalbumin / creatinine urine ratio  - POCT hemoglobin A1c  - Ambulatory referral to Ophthalmology; Future  - Ambulatory referral to Podiatry; Future    2  Essential hypertension  Blood pressure is well control, patient will continue same treatment  Patient understands the risks associated with HTN and the need for adequate control and adherence to therapy  Explained to patient that therapeutic measure is lifelong lifestyle modification including:  Sodium reduction (<2 g/day)  Dietary Approaches to Stop Hypertension (DASH) diet (3 servings of fruit and vegetables daily, whole grains, low sodium, low-fat proteins)   Weight loss to BMI under 30 kg/m^2  Physical activity: 3 to 5 times/week of daily aerobic exercise for 30- to 50-minute sessions as tolerated   Avoid alcohol consumption       - atorvastatin (LIPITOR) 40 mg tablet; Take 1 tablet (40 mg total) by mouth daily  Dispense: 30 tablet; Refill: 5    3  Body mass index (BMI) 50 0-59 9, adult (HCC)  We discussed about the initial approach to the obese patient who desires to lose weight  Patient should diet and exercise, as recommended by the NIH Expert Panel in 1998   A combination of a reduced-calorie diet and exercise is more efficacious than either alone  Additional weight loss may be possible with some medication regimens   The initial goal of weight loss therapy (diet and exercise) is a 10% reduction in body weight over a 6-month period  After the initial 6-month period, we will reassess to determine the efficacy of the therapy, whether the patient needs to lose more weight, or whether a weight-maintenance program may be established  No problem-specific Assessment & Plan notes found for this encounter  Diagnoses and all orders for this visit:    Essential hypertension  -     atorvastatin (LIPITOR) 40 mg tablet; Take 1 tablet (40 mg total) by mouth daily    Uncontrolled type 2 diabetes mellitus with hyperglycemia (HCC)  -     Microalbumin / creatinine urine ratio  -     POCT hemoglobin A1c  -     Ambulatory referral to Ophthalmology; Future  -     Ambulatory referral to Podiatry; Future    Body mass index (BMI) 50 0-59 9, adult (HCC)    Other orders  -     Cancel: CBC and differential; Future          Subjective:      Patient ID: Lauren Zavala is a 63 y o  female  Patient comes for a physical exam  No acute complains at the moment  She has a PMH of diabetes, and HTN  For her diabetes, she is taking metforming  He is compliant with her medications, for BP she is taking cavedilol, losartan  She complains of lower back pain, non radiating, mild for the past 2 weeks  The following portions of the patient's history were reviewed and updated as appropriate: allergies, current medications, past family history, past medical history, past social history, past surgical history and problem list     Review of Systems   Constitutional: Negative for diaphoresis, fatigue, fever and unexpected weight change  Respiratory: Negative for cough, chest tightness, shortness of breath and wheezing  Cardiovascular: Negative for chest pain, palpitations and leg swelling  Gastrointestinal: Negative for abdominal pain, blood in stool and constipation  Musculoskeletal: Positive for back pain (lower back pain)  Neurological: Negative for dizziness, syncope, light-headedness and headaches  Hematological: Does not bruise/bleed easily  Psychiatric/Behavioral: Negative for behavioral problems, self-injury and sleep disturbance  The patient is not nervous/anxious  Objective:      /80 (BP Location: Left arm, Patient Position: Sitting, Cuff Size: Standard)   Pulse 78   Temp 98 °F (36 7 °C) (Temporal)   Resp 16   Ht 4' 4" (1 321 m)   Wt 86 2 kg (190 lb)   SpO2 97%   Breastfeeding No   BMI 49 40 kg/m²          Physical Exam  Constitutional:       Comments: Body mass index is 49 4 kg/m²  HENT:      Head: Normocephalic  Eyes:      Pupils: Pupils are equal, round, and reactive to light  Neck:      Musculoskeletal: Neck supple  Cardiovascular:      Rate and Rhythm: Normal rate and regular rhythm  Pulses: no weak pulses          Dorsalis pedis pulses are 2+ on the right side and 2+ on the left side  Posterior tibial pulses are 2+ on the right side and 2+ on the left side  Pulmonary:      Effort: Pulmonary effort is normal    Abdominal:      Palpations: Abdomen is soft  Musculoskeletal: Normal range of motion  Feet:    Feet:      Right foot:      Skin integrity: No ulcer, skin breakdown, erythema, warmth, callus or dry skin  Left foot:      Skin integrity: No ulcer, skin breakdown, erythema, warmth, callus or dry skin  Skin:     General: Skin is warm and dry  Neurological:      Mental Status: She is alert  Psychiatric:         Behavior: Behavior normal            Patient's shoes and socks removed  Right Foot/Ankle   Right Foot Inspection  Skin Exam: skin normal and skin intact no dry skin, no warmth, no callus, no erythema, no maceration, no abnormal color, no pre-ulcer, no ulcer and no callus                          Toe Exam: ROM and strength within normal limitsno swelling, no tenderness, erythema and  no right toe deformity  Sensory   Vibration: intact  Proprioception: intact   Monofilament testing: intact  Vascular  Capillary refills: < 3 seconds  The right DP pulse is 2+  The right PT pulse is 2+  Left Foot/Ankle  Left Foot Inspection  Skin Exam: skin normal and skin intactno dry skin, no warmth, no erythema, no maceration, normal color, no pre-ulcer, no ulcer and no callus                         Toe Exam: ROM and strength within normal limitsno swelling, no tenderness, no erythema and no left toe deformity                   Sensory   Vibration: intact  Proprioception: intact  Monofilament: intact  Vascular  Capillary refills: < 3 seconds  The left DP pulse is 2+  The left PT pulse is 2+  Assign Risk Category:  No deformity present; No loss of protective sensation;  No weak pulses       Risk: 1

## 2020-08-28 NOTE — PATIENT INSTRUCTIONS
Medicare Preventive Visit Patient Instructions  Thank you for completing your Welcome to Medicare Visit or Medicare Annual Wellness Visit today  Your next wellness visit will be due in one year (8/28/2021)  The screening/preventive services that you may require over the next 5-10 years are detailed below  Some tests may not apply to you based off risk factors and/or age  Screening tests ordered at today's visit but not completed yet may show as past due  Also, please note that scanned in results may not display below  Preventive Screenings:  Service Recommendations Previous Testing/Comments   Colorectal Cancer Screening  * Colonoscopy    * Fecal Occult Blood Test (FOBT)/Fecal Immunochemical Test (FIT)  * Fecal DNA/Cologuard Test  * Flexible Sigmoidoscopy Age: 54-65 years old   Colonoscopy: every 10 years (may be performed more frequently if at higher risk)  OR  FOBT/FIT: every 1 year  OR  Cologuard: every 3 years  OR  Sigmoidoscopy: every 5 years  Screening may be recommended earlier than age 48 if at higher risk for colorectal cancer  Also, an individualized decision between you and your healthcare provider will decide whether screening between the ages of 74-80 would be appropriate  Colonoscopy: 09/12/2018  FOBT/FIT: Not on file  Cologuard: Not on file  Sigmoidoscopy: Not on file    Screening Current     Breast Cancer Screening Age: 36 years old  Frequency: every 1-2 years  Not required if history of left and right mastectomy Mammogram: Not on file       Cervical Cancer Screening Between the ages of 21-29, pap smear recommended once every 3 years  Between the ages of 33-67, can perform pap smear with HPV co-testing every 5 years     Recommendations may differ for women with a history of total hysterectomy, cervical cancer, or abnormal pap smears in past  Pap Smear: Not on file       Hepatitis C Screening Once for adults born between 1945 and 1965  More frequently in patients at high risk for Hepatitis C Hep C Antibody: 08/17/2018    Screening Current   Diabetes Screening 1-2 times per year if you're at risk for diabetes or have pre-diabetes Fasting glucose: 115 mg/dL   A1C: 6 6    Screening Not Indicated  History Diabetes   Cholesterol Screening Once every 5 years if you don't have a lipid disorder  May order more often based on risk factors  Lipid panel: 05/17/2019    Screening Not Indicated  History Lipid Disorder     Other Preventive Screenings Covered by Medicare:  1  Abdominal Aortic Aneurysm (AAA) Screening: covered once if your at risk  You're considered to be at risk if you have a family history of AAA  2  Lung Cancer Screening: covers low dose CT scan once per year if you meet all of the following conditions: (1) Age 50-69; (2) No signs or symptoms of lung cancer; (3) Current smoker or have quit smoking within the last 15 years; (4) You have a tobacco smoking history of at least 30 pack years (packs per day multiplied by number of years you smoked); (5) You get a written order from a healthcare provider  3  Glaucoma Screening: covered annually if you're considered high risk: (1) You have diabetes OR (2) Family history of glaucoma OR (3)  aged 48 and older OR (3)  American aged 72 and older  3  Osteoporosis Screening: covered every 2 years if you meet one of the following conditions: (1) You're estrogen deficient and at risk for osteoporosis based off medical history and other findings; (2) Have a vertebral abnormality; (3) On glucocorticoid therapy for more than 3 months; (4) Have primary hyperparathyroidism; (5) On osteoporosis medications and need to assess response to drug therapy  · Last bone density test (DXA Scan): Not on file  5  HIV Screening: covered annually if you're between the age of 12-76  Also covered annually if you are younger than 13 and older than 72 with risk factors for HIV infection   For pregnant patients, it is covered up to 3 times per pregnancy  Immunizations:  Immunization Recommendations   Influenza Vaccine Annual influenza vaccination during flu season is recommended for all persons aged >= 6 months who do not have contraindications   Pneumococcal Vaccine (Prevnar and Pneumovax)  * Prevnar = PCV13  * Pneumovax = PPSV23   Adults 25-60 years old: 1-3 doses may be recommended based on certain risk factors  Adults 72 years old: Prevnar (PCV13) vaccine recommended followed by Pneumovax (PPSV23) vaccine  If already received PPSV23 since turning 65, then PCV13 recommended at least one year after PPSV23 dose  Hepatitis B Vaccine 3 dose series if at intermediate or high risk (ex: diabetes, end stage renal disease, liver disease)   Tetanus (Td) Vaccine - COST NOT COVERED BY MEDICARE PART B Following completion of primary series, a booster dose should be given every 10 years to maintain immunity against tetanus  Td may also be given as tetanus wound prophylaxis  Tdap Vaccine - COST NOT COVERED BY MEDICARE PART B Recommended at least once for all adults  For pregnant patients, recommended with each pregnancy  Shingles Vaccine (Shingrix) - COST NOT COVERED BY MEDICARE PART B  2 shot series recommended in those aged 48 and above     Health Maintenance Due:      Topic Date Due    MAMMOGRAM  1960    Cervical Cancer Screening  06/23/1980    Hepatitis C Screening  Completed     Immunizations Due:      Topic Date Due    Influenza Vaccine  07/01/2020     Advance Directives   What are advance directives? Advance directives are legal documents that state your wishes and plans for medical care  These plans are made ahead of time in case you lose your ability to make decisions for yourself  Advance directives can apply to any medical decision, such as the treatments you want, and if you want to donate organs  What are the types of advance directives? There are many types of advance directives, and each state has rules about how to use them  You may choose a combination of any of the following:  · Living will: This is a written record of the treatment you want  You can also choose which treatments you do not want, which to limit, and which to stop at a certain time  This includes surgery, medicine, IV fluid, and tube feedings  · Durable power of  for healthcare [facility]): This is a written record that states who you want to make healthcare choices for you when you are unable to make them for yourself  This person, called a proxy, is usually a family member or a friend  You may choose more than 1 proxy  · Do not resuscitate (DNR) order:  A DNR order is used in case your heart stops beating or you stop breathing  It is a request not to have certain forms of treatment, such as CPR  A DNR order may be included in other types of advance directives  · Medical directive: This covers the care that you want if you are in a coma, near death, or unable to make decisions for yourself  You can list the treatments you want for each condition  Treatment may include pain medicine, surgery, blood transfusions, dialysis, IV or tube feedings, and a ventilator (breathing machine)  · Values history: This document has questions about your views, beliefs, and how you feel and think about life  This information can help others choose the care that you would choose  Why are advance directives important? An advance directive helps you control your care  Although spoken wishes may be used, it is better to have your wishes written down  Spoken wishes can be misunderstood, or not followed  Treatments may be given even if you do not want them  An advance directive may make it easier for your family to make difficult choices about your care  Weight Management   Why it is important to manage your weight:  Being overweight increases your risk of health conditions such as heart disease, high blood pressure, type 2 diabetes, and certain types of cancer   It can also increase your risk for osteoarthritis, sleep apnea, and other respiratory problems  Aim for a slow, steady weight loss  Even a small amount of weight loss can lower your risk of health problems  How to lose weight safely:  A safe and healthy way to lose weight is to eat fewer calories and get regular exercise  You can lose up about 1 pound a week by decreasing the number of calories you eat by 500 calories each day  Healthy meal plan for weight management:  A healthy meal plan includes a variety of foods, contains fewer calories, and helps you stay healthy  A healthy meal plan includes the following:  · Eat whole-grain foods more often  A healthy meal plan should contain fiber  Fiber is the part of grains, fruits, and vegetables that is not broken down by your body  Whole-grain foods are healthy and provide extra fiber in your diet  Some examples of whole-grain foods are whole-wheat breads and pastas, oatmeal, brown rice, and bulgur  · Eat a variety of vegetables every day  Include dark, leafy greens such as spinach, kale, saray greens, and mustard greens  Eat yellow and orange vegetables such as carrots, sweet potatoes, and winter squash  · Eat a variety of fruits every day  Choose fresh or canned fruit (canned in its own juice or light syrup) instead of juice  Fruit juice has very little or no fiber  · Eat low-fat dairy foods  Drink fat-free (skim) milk or 1% milk  Eat fat-free yogurt and low-fat cottage cheese  Try low-fat cheeses such as mozzarella and other reduced-fat cheeses  · Choose meat and other protein foods that are low in fat  Choose beans or other legumes such as split peas or lentils  Choose fish, skinless poultry (chicken or turkey), or lean cuts of red meat (beef or pork)  Before you cook meat or poultry, cut off any visible fat  · Use less fat and oil  Try baking foods instead of frying them   Add less fat, such as margarine, sour cream, regular salad dressing and mayonnaise to foods  Eat fewer high-fat foods  Some examples of high-fat foods include french fries, doughnuts, ice cream, and cakes  · Eat fewer sweets  Limit foods and drinks that are high in sugar  This includes candy, cookies, regular soda, and sweetened drinks  Exercise:  Exercise at least 30 minutes per day on most days of the week  Some examples of exercise include walking, biking, dancing, and swimming  You can also fit in more physical activity by taking the stairs instead of the elevator or parking farther away from stores  Ask your healthcare provider about the best exercise plan for you  © Copyright [employer] 2018 Information is for End User's use only and may not be sold, redistributed or otherwise used for commercial purposes   All illustrations and images included in CareNotes® are the copyrighted property of A D A M , Inc  or [address] [place], [place] [address]pa[facility]

## 2020-09-03 DIAGNOSIS — E11.65 UNCONTROLLED TYPE 2 DIABETES MELLITUS WITH HYPERGLYCEMIA (HCC): ICD-10-CM

## 2020-09-15 DIAGNOSIS — I10 HYPERTENSION, UNSPECIFIED TYPE: ICD-10-CM

## 2020-09-17 RX ORDER — CARVEDILOL 12.5 MG/1
12.5 TABLET ORAL 2 TIMES DAILY WITH MEALS
Qty: 180 TABLET | Refills: 3 | Status: SHIPPED | OUTPATIENT
Start: 2020-09-17 | End: 2021-04-06 | Stop reason: ALTCHOICE

## 2020-09-22 ENCOUNTER — DOCTOR'S OFFICE (OUTPATIENT)
Dept: URBAN - METROPOLITAN AREA CLINIC 136 | Facility: CLINIC | Age: 60
Setting detail: OPHTHALMOLOGY
End: 2020-09-22
Payer: MEDICARE

## 2020-09-22 DIAGNOSIS — H43.811: ICD-10-CM

## 2020-09-22 DIAGNOSIS — E11.9: ICD-10-CM

## 2020-09-22 DIAGNOSIS — H35.371: ICD-10-CM

## 2020-09-22 DIAGNOSIS — H25.13: ICD-10-CM

## 2020-09-22 DIAGNOSIS — H40.013: ICD-10-CM

## 2020-09-22 PROCEDURE — 92014 COMPRE OPH EXAM EST PT 1/>: CPT | Performed by: OPHTHALMOLOGY

## 2020-09-22 PROCEDURE — 92020 GONIOSCOPY: CPT | Performed by: OPHTHALMOLOGY

## 2020-09-22 ASSESSMENT — CONFRONTATIONAL VISUAL FIELD TEST (CVF)
OD_FINDINGS: FULL
OS_FINDINGS: FULL

## 2020-10-02 ENCOUNTER — OFFICE VISIT (OUTPATIENT)
Dept: FAMILY MEDICINE CLINIC | Facility: CLINIC | Age: 60
End: 2020-10-02
Payer: COMMERCIAL

## 2020-10-02 VITALS
OXYGEN SATURATION: 98 % | BODY MASS INDEX: 44.9 KG/M2 | HEIGHT: 55 IN | SYSTOLIC BLOOD PRESSURE: 138 MMHG | HEART RATE: 88 BPM | DIASTOLIC BLOOD PRESSURE: 90 MMHG | TEMPERATURE: 96.7 F | WEIGHT: 194 LBS | RESPIRATION RATE: 18 BRPM

## 2020-10-02 DIAGNOSIS — Z23 NEEDS FLU SHOT: ICD-10-CM

## 2020-10-02 DIAGNOSIS — J01.10 ACUTE NON-RECURRENT FRONTAL SINUSITIS: Primary | ICD-10-CM

## 2020-10-02 PROCEDURE — 1036F TOBACCO NON-USER: CPT | Performed by: NURSE PRACTITIONER

## 2020-10-02 PROCEDURE — 90682 RIV4 VACC RECOMBINANT DNA IM: CPT | Performed by: NURSE PRACTITIONER

## 2020-10-02 PROCEDURE — 99214 OFFICE O/P EST MOD 30 MIN: CPT | Performed by: NURSE PRACTITIONER

## 2020-10-02 PROCEDURE — 3725F SCREEN DEPRESSION PERFORMED: CPT | Performed by: NURSE PRACTITIONER

## 2020-10-02 PROCEDURE — G0008 ADMIN INFLUENZA VIRUS VAC: HCPCS | Performed by: NURSE PRACTITIONER

## 2020-10-02 PROCEDURE — 3080F DIAST BP >= 90 MM HG: CPT | Performed by: NURSE PRACTITIONER

## 2020-10-02 RX ORDER — AMOXICILLIN AND CLAVULANATE POTASSIUM 875; 125 MG/1; MG/1
1 TABLET, FILM COATED ORAL EVERY 12 HOURS SCHEDULED
Qty: 14 TABLET | Refills: 0 | Status: SHIPPED | OUTPATIENT
Start: 2020-10-02 | End: 2020-10-09

## 2020-11-06 DIAGNOSIS — I10 ESSENTIAL HYPERTENSION: ICD-10-CM

## 2020-11-09 PROCEDURE — 4010F ACE/ARB THERAPY RXD/TAKEN: CPT | Performed by: FAMILY MEDICINE

## 2020-11-09 RX ORDER — LOSARTAN POTASSIUM 50 MG/1
TABLET ORAL
Qty: 90 TABLET | Refills: 0 | Status: SHIPPED | OUTPATIENT
Start: 2020-11-09 | End: 2020-11-09 | Stop reason: SDUPTHER

## 2020-11-09 RX ORDER — LOSARTAN POTASSIUM 50 MG/1
50 TABLET ORAL DAILY
Qty: 90 TABLET | Refills: 3 | Status: SHIPPED | OUTPATIENT
Start: 2020-11-09 | End: 2021-04-22 | Stop reason: SDUPTHER

## 2020-11-24 ENCOUNTER — OFFICE VISIT (OUTPATIENT)
Dept: FAMILY MEDICINE CLINIC | Facility: CLINIC | Age: 60
End: 2020-11-24
Payer: COMMERCIAL

## 2020-11-24 VITALS
RESPIRATION RATE: 16 BRPM | TEMPERATURE: 97.9 F | DIASTOLIC BLOOD PRESSURE: 80 MMHG | HEIGHT: 55 IN | SYSTOLIC BLOOD PRESSURE: 150 MMHG | BODY MASS INDEX: 44.43 KG/M2 | OXYGEN SATURATION: 97 % | HEART RATE: 80 BPM | WEIGHT: 192 LBS

## 2020-11-24 DIAGNOSIS — J34.89 NASAL OBSTRUCTION: ICD-10-CM

## 2020-11-24 DIAGNOSIS — E11.65 UNCONTROLLED TYPE 2 DIABETES MELLITUS WITH HYPERGLYCEMIA (HCC): Primary | ICD-10-CM

## 2020-11-24 DIAGNOSIS — Z12.4 CERVICAL CANCER SCREENING: ICD-10-CM

## 2020-11-24 DIAGNOSIS — I10 ESSENTIAL HYPERTENSION: ICD-10-CM

## 2020-11-24 PROCEDURE — 1036F TOBACCO NON-USER: CPT | Performed by: FAMILY MEDICINE

## 2020-11-24 PROCEDURE — 3077F SYST BP >= 140 MM HG: CPT | Performed by: FAMILY MEDICINE

## 2020-11-24 PROCEDURE — 99214 OFFICE O/P EST MOD 30 MIN: CPT | Performed by: FAMILY MEDICINE

## 2020-11-24 PROCEDURE — 3079F DIAST BP 80-89 MM HG: CPT | Performed by: FAMILY MEDICINE

## 2020-11-24 PROCEDURE — 3008F BODY MASS INDEX DOCD: CPT | Performed by: FAMILY MEDICINE

## 2020-11-28 ASSESSMENT — REFRACTION_AUTOREFRACTION
OD_CYLINDER: -5.25
OS_CYLINDER: -2.00
OD_AXIS: 179
OS_AXIS: 170
OS_SPHERE: +5.50
OD_SPHERE: -5.75

## 2020-11-28 ASSESSMENT — REFRACTION_CURRENTRX
OS_SPHERE: -5.25
OD_CYLINDER: -1.50
OS_CYLINDER: -2.75
OD_SPHERE: -5.50
OD_OVR_VA: 20/
OS_AXIS: 156
OS_OVR_VA: 20/
OD_AXIS: 024

## 2020-11-28 ASSESSMENT — VISUAL ACUITY
OD_BCVA: 20/40
OS_BCVA: 20/60

## 2020-11-28 ASSESSMENT — SPHEQUIV_DERIVED
OD_SPHEQUIV: -8.375
OS_SPHEQUIV: 4.5

## 2020-12-14 DIAGNOSIS — M54.41 CHRONIC LOW BACK PAIN WITH RIGHT-SIDED SCIATICA, UNSPECIFIED BACK PAIN LATERALITY: ICD-10-CM

## 2020-12-14 DIAGNOSIS — G89.29 CHRONIC LOW BACK PAIN WITH RIGHT-SIDED SCIATICA, UNSPECIFIED BACK PAIN LATERALITY: ICD-10-CM

## 2020-12-15 RX ORDER — IBUPROFEN 600 MG/1
TABLET ORAL
Qty: 90 TABLET | Refills: 4 | Status: SHIPPED | OUTPATIENT
Start: 2020-12-15 | End: 2021-04-06 | Stop reason: ALTCHOICE

## 2020-12-21 DIAGNOSIS — E11.65 UNCONTROLLED TYPE 2 DIABETES MELLITUS WITH HYPERGLYCEMIA (HCC): ICD-10-CM

## 2021-01-18 DIAGNOSIS — I10 ESSENTIAL HYPERTENSION: ICD-10-CM

## 2021-01-18 RX ORDER — ATORVASTATIN CALCIUM 40 MG/1
40 TABLET, FILM COATED ORAL DAILY
Qty: 30 TABLET | Refills: 5 | Status: SHIPPED | OUTPATIENT
Start: 2021-01-18 | End: 2021-02-25 | Stop reason: SDUPTHER

## 2021-02-25 DIAGNOSIS — I10 ESSENTIAL HYPERTENSION: ICD-10-CM

## 2021-02-25 RX ORDER — ATORVASTATIN CALCIUM 40 MG/1
40 TABLET, FILM COATED ORAL DAILY
Qty: 90 TABLET | Refills: 3 | Status: SHIPPED | OUTPATIENT
Start: 2021-02-25 | End: 2021-10-20 | Stop reason: SDUPTHER

## 2021-03-30 ENCOUNTER — IMMUNIZATIONS (OUTPATIENT)
Dept: FAMILY MEDICINE CLINIC | Facility: HOSPITAL | Age: 61
End: 2021-03-30

## 2021-03-30 DIAGNOSIS — Z23 ENCOUNTER FOR IMMUNIZATION: Primary | ICD-10-CM

## 2021-03-30 PROCEDURE — 0011A SARS-COV-2 / COVID-19 MRNA VACCINE (MODERNA) 100 MCG: CPT

## 2021-03-30 PROCEDURE — 91301 SARS-COV-2 / COVID-19 MRNA VACCINE (MODERNA) 100 MCG: CPT

## 2021-04-06 ENCOUNTER — NURSE TRIAGE (OUTPATIENT)
Dept: OTHER | Facility: OTHER | Age: 61
End: 2021-04-06

## 2021-04-06 ENCOUNTER — OFFICE VISIT (OUTPATIENT)
Dept: FAMILY MEDICINE CLINIC | Facility: CLINIC | Age: 61
End: 2021-04-06
Payer: COMMERCIAL

## 2021-04-06 VITALS
RESPIRATION RATE: 16 BRPM | WEIGHT: 197 LBS | OXYGEN SATURATION: 99 % | TEMPERATURE: 98 F | HEIGHT: 55 IN | DIASTOLIC BLOOD PRESSURE: 96 MMHG | SYSTOLIC BLOOD PRESSURE: 160 MMHG | BODY MASS INDEX: 45.59 KG/M2 | HEART RATE: 72 BPM

## 2021-04-06 DIAGNOSIS — Z11.4 SCREENING FOR HUMAN IMMUNODEFICIENCY VIRUS: ICD-10-CM

## 2021-04-06 DIAGNOSIS — M54.2 CERVICALGIA: ICD-10-CM

## 2021-04-06 DIAGNOSIS — R53.83 FATIGUE, UNSPECIFIED TYPE: Primary | ICD-10-CM

## 2021-04-06 DIAGNOSIS — I10 ESSENTIAL HYPERTENSION: ICD-10-CM

## 2021-04-06 DIAGNOSIS — E11.65 UNCONTROLLED TYPE 2 DIABETES MELLITUS WITH HYPERGLYCEMIA (HCC): ICD-10-CM

## 2021-04-06 LAB — SL AMB POCT HEMOGLOBIN AIC: 6.7 (ref ?–6.5)

## 2021-04-06 PROCEDURE — 20553 NJX 1/MLT TRIGGER POINTS 3/>: CPT | Performed by: FAMILY MEDICINE

## 2021-04-06 PROCEDURE — 83036 HEMOGLOBIN GLYCOSYLATED A1C: CPT | Performed by: FAMILY MEDICINE

## 2021-04-06 PROCEDURE — 99214 OFFICE O/P EST MOD 30 MIN: CPT | Performed by: FAMILY MEDICINE

## 2021-04-06 PROCEDURE — 3725F SCREEN DEPRESSION PERFORMED: CPT | Performed by: FAMILY MEDICINE

## 2021-04-06 PROCEDURE — 3044F HG A1C LEVEL LT 7.0%: CPT | Performed by: PHYSICIAN ASSISTANT

## 2021-04-06 RX ORDER — AMLODIPINE BESYLATE 10 MG/1
10 TABLET ORAL DAILY
Qty: 30 TABLET | Refills: 5 | Status: SHIPPED | OUTPATIENT
Start: 2021-04-06 | End: 2021-08-31

## 2021-04-06 RX ORDER — DICLOFENAC SODIUM 75 MG/1
75 TABLET, DELAYED RELEASE ORAL 2 TIMES DAILY
Qty: 100 TABLET | Refills: 0 | Status: SHIPPED | OUTPATIENT
Start: 2021-04-06 | End: 2021-04-08 | Stop reason: ALTCHOICE

## 2021-04-06 NOTE — TELEPHONE ENCOUNTER
Reason for Disposition   Systolic BP  >= 162 OR Diastolic >= 657    Answer Assessment - Initial Assessment Questions  1  BLOOD PRESSURE: "What is the blood pressure?" "Did you take at least two measurements 5 minutes apart?"      bp was 174/80 one hour ago now 163/80 after drinking water    2  ONSET: "When did you take your blood pressure?"      One hour ago   3  HOW: "How did you obtain the blood pressure?" (e g , visiting nurse, automatic home BP monitor)      Automatic monitor at home   4  HISTORY: "Do you have a history of high blood pressure?"      Yes   5  MEDICATIONS: "Are you taking any medications for blood pressure?" "Have you missed any doses recently?"      norvasc 10 mg daily losartan 50mg   6  OTHER SYMPTOMS: "Do you have any symptoms?" (e g , headache, chest pain, blurred vision, difficulty breathing, weakness)      Headache, breathing is heavy because agitated  7   PREGNANCY: "Is there any chance you are pregnant?" "When was your last menstrual period?"      no    Protocols used: HIGH BLOOD PRESSURE-ADULT-

## 2021-04-06 NOTE — TELEPHONE ENCOUNTER
Regarding: /88  ----- Message from Leandra Atwood sent at 4/6/2021  7:15 PM EDT -----  "I already took the BP medication that Dr Kristie Haque prescribed for me, and my BP is still at 175/88  I want to know if I should be taking another one "  Spoke with  # [phone] but pt stated she spoke [address]  Pt stated she took her BP twice 175/88 an hour ago and 165/80 right before she called, accompanied by a headache  She took both Norvasc 10 mg and Losartan 50mg and wants to know if she could take another  Paged her PCP who is also on call provider tonight  Dr Kristie Haque stated he will call patient directly

## 2021-04-06 NOTE — PROGRESS NOTES
Assessment/Plan:  Target control diabetes  Lab Results   Component Value Date/Time    HGBA1C 6 7 (A) 04/06/2021 11:49 AM    HGBA1C 6 7 (H) 05/17/2019 06:48 AM   Continue same treatment  HTN/SUBOPTIMAL CONTROL: Poor control of Bp  I discussed with patient regarding the need of compliance with medications  Exercise was encouraged as a change of life style modification  The main goal of treatment is to decrease the risk of mortality and of cardiovascular and renal morbidity  BP goal should be less than 130/80 mmHg in patients with renal disease, and less than 140/90 mmHg in all other patients  will start on calcium channel blocker amlodipine/ Continue Losartan  Trigger point injection in the neck performed without complications   Muscle injected:  Trapezius, levatore scapulae, serratus posterior superior,       No problem-specific Assessment & Plan notes found for this encounter  Diagnoses and all orders for this visit:    Fatigue, unspecified type  -     CBC and differential; Future    Screening for human immunodeficiency virus  -     HIV 1/2 Antigen/Antibody (4th Generation) w Reflex [facility]; Future    Uncontrolled type 2 diabetes mellitus with hyperglycemia ([facility] )  -     Comprehensive metabolic panel; Future  -     Microalbumin / creatinine urine ratio  -     Lipid panel; Future  -     POCT hemoglobin A1c    Body mass index (BMI) 45 0-49 9, adult (HCC)    Essential hypertension  -     amLODIPine (NORVASC) 10 mg tablet; Take 1 tablet (10 mg total) by mouth daily    Cervicalgia  -     Discontinue: diclofenac (VOLTAREN) 75 mg EC tablet; Take 1 tablet (75 mg total) by mouth 2 (two) times a day          Subjective:      Patient ID: Gerardo Peck is a 63 y o  female  Patient is here to follow Diabetes and HTN, patient states good compliance with treatment  Denies chest pain, shortness of breath, angina, urinary problems  No exercise but follows low salt diet    BP at home is high, She has a right sided neck pain  The following portions of the patient's history were reviewed and updated as appropriate: allergies, current medications, past family history, past medical history, past social history, past surgical history and problem list     Review of Systems   Constitutional: Negative for diaphoresis, fatigue, fever and unexpected weight change  Respiratory: Negative for cough, chest tightness, shortness of breath and wheezing  Cardiovascular: Negative for chest pain, palpitations and leg swelling  Gastrointestinal: Negative for abdominal pain, blood in stool and constipation  Musculoskeletal: Positive for neck pain  Neurological: Negative for dizziness, syncope, light-headedness and headaches  Hematological: Does not bruise/bleed easily  Psychiatric/Behavioral: Negative for behavioral problems, self-injury and sleep disturbance  The patient is not nervous/anxious  Objective:      /96 (BP Location: Left arm, Patient Position: Sitting, Cuff Size: Standard)   Pulse 72   Temp 98 °F (36 7 °C) (Temporal)   Resp 16   Ht 4' 4" (1 321 m)   Wt 89 4 kg (197 lb)   SpO2 99%   Breastfeeding No   BMI 51 22 kg/m²          Physical Exam  HENT:      Head: Normocephalic  Eyes:      Pupils: Pupils are equal, round, and reactive to light  Neck:      Musculoskeletal: Neck supple  Cardiovascular:      Rate and Rhythm: Normal rate and regular rhythm  Pulmonary:      Effort: Pulmonary effort is normal    Abdominal:      Palpations: Abdomen is soft  Musculoskeletal: Normal range of motion  General: Tenderness (and muscle spasm of neck ) present  Skin:     General: Skin is warm and dry  Neurological:      Mental Status: She is alert     Psychiatric:         Behavior: Behavior normal

## 2021-04-12 ENCOUNTER — TELEPHONE (OUTPATIENT)
Dept: FAMILY MEDICINE CLINIC | Facility: CLINIC | Age: 61
End: 2021-04-12

## 2021-04-12 DIAGNOSIS — G89.29 CHRONIC LOW BACK PAIN WITH RIGHT-SIDED SCIATICA, UNSPECIFIED BACK PAIN LATERALITY: ICD-10-CM

## 2021-04-12 DIAGNOSIS — M54.41 CHRONIC LOW BACK PAIN WITH RIGHT-SIDED SCIATICA, UNSPECIFIED BACK PAIN LATERALITY: ICD-10-CM

## 2021-04-12 NOTE — TELEPHONE ENCOUNTER
Patient states that her BP has been 165/88 and 155/88  Patient wants to know what should she do  Patient also states that she took the med for pain the new one and her face swelled up and she has a rash

## 2021-04-13 RX ORDER — IBUPROFEN 600 MG/1
TABLET ORAL
Qty: 90 TABLET | Refills: 3 | OUTPATIENT
Start: 2021-04-13

## 2021-04-22 ENCOUNTER — OFFICE VISIT (OUTPATIENT)
Dept: FAMILY MEDICINE CLINIC | Facility: CLINIC | Age: 61
End: 2021-04-22
Payer: COMMERCIAL

## 2021-04-22 VITALS
WEIGHT: 194 LBS | OXYGEN SATURATION: 96 % | BODY MASS INDEX: 44.9 KG/M2 | SYSTOLIC BLOOD PRESSURE: 160 MMHG | HEART RATE: 120 BPM | TEMPERATURE: 98 F | RESPIRATION RATE: 20 BRPM | DIASTOLIC BLOOD PRESSURE: 80 MMHG | HEIGHT: 55 IN

## 2021-04-22 DIAGNOSIS — I10 ESSENTIAL HYPERTENSION: ICD-10-CM

## 2021-04-22 DIAGNOSIS — F41.9 ANXIETY: ICD-10-CM

## 2021-04-22 DIAGNOSIS — J01.11 ACUTE RECURRENT FRONTAL SINUSITIS: Primary | ICD-10-CM

## 2021-04-22 DIAGNOSIS — B34.9 ACUTE VIRAL SYNDROME: ICD-10-CM

## 2021-04-22 PROBLEM — Z12.4 CERVICAL CANCER SCREENING: Status: RESOLVED | Noted: 2020-11-24 | Resolved: 2021-04-22

## 2021-04-22 PROBLEM — Z23 NEEDS FLU SHOT: Status: RESOLVED | Noted: 2020-10-02 | Resolved: 2021-04-22

## 2021-04-22 PROBLEM — J34.89 NASAL OBSTRUCTION: Status: RESOLVED | Noted: 2020-11-24 | Resolved: 2021-04-22

## 2021-04-22 LAB
CREAT UR-MCNC: 105 MG/DL
MICROALBUMIN UR-MCNC: 214 MG/L (ref 0–20)
MICROALBUMIN/CREAT 24H UR: 204 MG/G CREATININE (ref 0–30)

## 2021-04-22 PROCEDURE — 82570 ASSAY OF URINE CREATININE: CPT | Performed by: FAMILY MEDICINE

## 2021-04-22 PROCEDURE — 3008F BODY MASS INDEX DOCD: CPT | Performed by: PHYSICIAN ASSISTANT

## 2021-04-22 PROCEDURE — 3060F POS MICROALBUMINURIA REV: CPT | Performed by: PHYSICIAN ASSISTANT

## 2021-04-22 PROCEDURE — 4010F ACE/ARB THERAPY RXD/TAKEN: CPT | Performed by: PHYSICIAN ASSISTANT

## 2021-04-22 PROCEDURE — 3079F DIAST BP 80-89 MM HG: CPT | Performed by: PHYSICIAN ASSISTANT

## 2021-04-22 PROCEDURE — 99214 OFFICE O/P EST MOD 30 MIN: CPT | Performed by: PHYSICIAN ASSISTANT

## 2021-04-22 PROCEDURE — 1036F TOBACCO NON-USER: CPT | Performed by: PHYSICIAN ASSISTANT

## 2021-04-22 PROCEDURE — U0005 INFEC AGEN DETEC AMPLI PROBE: HCPCS | Performed by: PHYSICIAN ASSISTANT

## 2021-04-22 PROCEDURE — U0003 INFECTIOUS AGENT DETECTION BY NUCLEIC ACID (DNA OR RNA); SEVERE ACUTE RESPIRATORY SYNDROME CORONAVIRUS 2 (SARS-COV-2) (CORONAVIRUS DISEASE [COVID-19]), AMPLIFIED PROBE TECHNIQUE, MAKING USE OF HIGH THROUGHPUT TECHNOLOGIES AS DESCRIBED BY CMS-2020-01-R: HCPCS | Performed by: PHYSICIAN ASSISTANT

## 2021-04-22 PROCEDURE — 3077F SYST BP >= 140 MM HG: CPT | Performed by: PHYSICIAN ASSISTANT

## 2021-04-22 PROCEDURE — 82043 UR ALBUMIN QUANTITATIVE: CPT | Performed by: FAMILY MEDICINE

## 2021-04-22 RX ORDER — BUSPIRONE HYDROCHLORIDE 5 MG/1
5 TABLET ORAL
Qty: 30 TABLET | Refills: 0 | Status: SHIPPED | OUTPATIENT
Start: 2021-04-22 | End: 2021-05-18

## 2021-04-22 RX ORDER — LOSARTAN POTASSIUM 100 MG/1
100 TABLET ORAL DAILY
Qty: 90 TABLET | Refills: 3 | Status: SHIPPED | OUTPATIENT
Start: 2021-04-22 | End: 2021-05-24 | Stop reason: SDUPTHER

## 2021-04-22 NOTE — ASSESSMENT & PLAN NOTE
Sinus pressure, burning sensation in sinuses, headache, sore throat started yesterday  Tmax 99F  Doe Richard reports that she gets these sinus infections every year; she has tried everything OTC including mucinex, flonase nasal spray, and Tylenol, and nothing helps her  Last year, Kameron Mercado gave her antibiotics and her symptoms cleared up  I explained to her that most of the time sinusitis is caused by a virus, and antibiotics are not indicated or helpful  We discussed symptomatic treatments and the importance of staying well hydrated  Usually viral sinus infections start to improve in about 10 days; I instructed her to call the office if her symptoms are unchanged in one week, and at that time we could discuss treating with an antibiotic  She verbalized understanding and agreement with the plan  No known exposure, however I swabbed her for COVID to rule that out

## 2021-04-22 NOTE — ASSESSMENT & PLAN NOTE
Unstable  Her pressure was uncontrolled at last visit with Dr Pineda Barnard in November; he started her on amlodipine and had her take an extra losartan for 4 days  She reports that her blood pressure improved at that time, however recently it has been elevated sometimes  She did take her medications today  She reports palpitations; she can feel that her heart is racing when her blood pressure is high  Tachycardia noted in office  Increase losartan to 100mg daily  Continue amlodipine 10mg daily  Recheck in one month

## 2021-04-22 NOTE — PROGRESS NOTES
Assessment/Plan:    Acute non-recurrent frontal sinusitis  Sinus pressure, burning sensation in sinuses, headache, sore throat started yesterday  Tmax 99F  Zaira Gamino reports that she gets these sinus infections every year; she has tried everything OTC including mucinex, flonase nasal spray, and Tylenol, and nothing helps her  Last year, Elaine Mccormick gave her antibiotics and her symptoms cleared up  I explained to her that most of the time sinusitis is caused by a virus, and antibiotics are not indicated or helpful  We discussed symptomatic treatments and the importance of staying well hydrated  Usually viral sinus infections start to improve in about 10 days; I instructed her to call the office if her symptoms are unchanged in one week, and at that time we could discuss treating with an antibiotic  She verbalized understanding and agreement with the plan  No known exposure, however I swabbed her for COVID to rule that out  Essential hypertension  Unstable  Her pressure was uncontrolled at last visit with Dr Leonie Meza in November; he started her on amlodipine and had her take an extra losartan for 4 days  She reports that her blood pressure improved at that time, however recently it has been elevated sometimes  She did take her medications today  She reports palpitations; she can feel that her heart is racing when her blood pressure is high  Tachycardia noted in office  Increase losartan to 100mg daily  Continue amlodipine 10mg daily  Recheck in one month  Anxiety  Tova reports a lot of stress and anxiety lately related to her family  She has a lot on her shoulders, and feels like her stress level is "15/10" all the time  She has tried meditation, relaxation techniques that she found online, and nothing has helped very much  She is apprehensive about starting a medication for mood because she is afraid that it will make her feel "out of it", since she is taking care of her sister       Trial 5mg buspar at bedtime  We discussed only using this medication for a limited time, to help her get through this time of increased stress in her family  She is in agreement  Diagnoses and all orders for this visit:    Acute recurrent frontal sinusitis    Essential hypertension  -     losartan (COZAAR) 100 MG tablet; Take 1 tablet (100 mg total) by mouth daily    Anxiety  -     busPIRone (BUSPAR) 5 mg tablet; Take 1 tablet (5 mg total) by mouth daily at bedtime    Acute viral syndrome  -     Novel Coronavirus (COVID-19), PCR [facility] Collected in Office          Subjective:      Patient ID: Iesha Srivastava is a 63 y o  female  Hubert Barrera presents to the office today for sinus pain and headache  Her blood pressure was also noted to be elevated upon arrival  No other acute complaints today  The following portions of the patient's history were reviewed and updated as appropriate: allergies, current medications, past family history, past medical history, past social history, past surgical history and problem list     Review of Systems   Constitutional: Negative for appetite change, chills, diaphoresis, fatigue and fever  HENT: Positive for congestion, ear pain (R ear), sinus pressure, sinus pain and sore throat  Negative for postnasal drip, rhinorrhea, sneezing, tinnitus and trouble swallowing  Eyes: Negative for visual disturbance  Respiratory: Negative for cough, choking, chest tightness, shortness of breath, wheezing and stridor  Cardiovascular: Positive for palpitations  Negative for chest pain and leg swelling  Gastrointestinal: Positive for diarrhea (chronic)  Negative for abdominal pain, constipation, nausea and vomiting  Musculoskeletal: Positive for back pain (chronic)  Negative for neck pain and neck stiffness  Neurological: Positive for headaches  Negative for dizziness, tremors, syncope, facial asymmetry, speech difficulty, weakness, light-headedness and numbness     Psychiatric/Behavioral: The patient is nervous/anxious  Objective:      /80 (BP Location: Left arm, Patient Position: Sitting, Cuff Size: Standard)   Pulse (!) 120   Temp 98 °F (36 7 °C) (Temporal)   Resp 20   Ht 4' 4" (1 321 m)   Wt 88 kg (194 lb)   SpO2 96%   Breastfeeding No   BMI 50 44 kg/m²          Physical Exam  Vitals signs reviewed  Constitutional:       General: She is not in acute distress  Appearance: She is obese  She is not toxic-appearing  HENT:      Head: Normocephalic and atraumatic  Right Ear: Tympanic membrane normal       Left Ear: Tympanic membrane normal       Nose: Congestion present  No rhinorrhea  Mouth/Throat:      Mouth: Mucous membranes are moist       Pharynx: Oropharynx is clear  Posterior oropharyngeal erythema present  No oropharyngeal exudate  Eyes:      Extraocular Movements: Extraocular movements intact  Conjunctiva/sclera: Conjunctivae normal       Pupils: Pupils are equal, round, and reactive to light  Neck:      Musculoskeletal: Normal range of motion  No neck rigidity or muscular tenderness  Cardiovascular:      Rate and Rhythm: Regular rhythm  Tachycardia present  Pulses: Normal pulses  Heart sounds: Normal heart sounds  No murmur  No gallop  Pulmonary:      Effort: Pulmonary effort is normal  No respiratory distress  Breath sounds: Normal breath sounds  No stridor  No wheezing, rhonchi or rales  Musculoskeletal: Normal range of motion  Right lower leg: No edema  Left lower leg: No edema  Lymphadenopathy:      Cervical: No cervical adenopathy  Skin:     General: Skin is warm and dry  Neurological:      Mental Status: She is alert and oriented to person, place, and time  Motor: No weakness  Psychiatric:         Mood and Affect: Mood is anxious  Behavior: Behavior normal          Thought Content:  Thought content normal

## 2021-04-22 NOTE — ASSESSMENT & PLAN NOTE
Hussein Benjamin reports a lot of stress and anxiety lately related to her family  She has a lot on her shoulders, and feels like her stress level is "15/10" all the time  She has tried meditation, relaxation techniques that she found online, and nothing has helped very much  She is apprehensive about starting a medication for mood because she is afraid that it will make her feel "out of it", since she is taking care of her sister  Trial 5mg buspar at bedtime  We discussed only using this medication for a limited time, to help her get through this time of increased stress in her family  She is in agreement

## 2021-04-23 LAB — SARS-COV-2 RNA RESP QL NAA+PROBE: NEGATIVE

## 2021-05-03 ENCOUNTER — IMMUNIZATIONS (OUTPATIENT)
Dept: FAMILY MEDICINE CLINIC | Facility: HOSPITAL | Age: 61
End: 2021-05-03

## 2021-05-03 DIAGNOSIS — Z23 ENCOUNTER FOR IMMUNIZATION: Primary | ICD-10-CM

## 2021-05-03 PROCEDURE — 0012A SARS-COV-2 / COVID-19 MRNA VACCINE (MODERNA) 100 MCG: CPT

## 2021-05-03 PROCEDURE — 91301 SARS-COV-2 / COVID-19 MRNA VACCINE (MODERNA) 100 MCG: CPT

## 2021-05-12 ENCOUNTER — HOSPITAL ENCOUNTER (OUTPATIENT)
Dept: MAMMOGRAPHY | Facility: CLINIC | Age: 61
Discharge: HOME | End: 2021-05-12
Payer: COMMERCIAL

## 2021-05-12 ENCOUNTER — LAB (OUTPATIENT)
Dept: LAB | Facility: HOSPITAL | Age: 61
End: 2021-05-12
Payer: COMMERCIAL

## 2021-05-12 VITALS — BODY MASS INDEX: 44.9 KG/M2 | WEIGHT: 194 LBS | HEIGHT: 55 IN

## 2021-05-12 DIAGNOSIS — Z12.31 ENCOUNTER FOR SCREENING MAMMOGRAM FOR MALIGNANT NEOPLASM OF BREAST: ICD-10-CM

## 2021-05-12 DIAGNOSIS — E11.65 UNCONTROLLED TYPE 2 DIABETES MELLITUS WITH HYPERGLYCEMIA (HCC): ICD-10-CM

## 2021-05-12 DIAGNOSIS — R53.83 FATIGUE, UNSPECIFIED TYPE: ICD-10-CM

## 2021-05-12 DIAGNOSIS — Z11.4 SCREENING FOR HUMAN IMMUNODEFICIENCY VIRUS: ICD-10-CM

## 2021-05-12 LAB
ALBUMIN SERPL BCP-MCNC: 4.3 G/DL (ref 3–5.2)
ALP SERPL-CCNC: 92 U/L (ref 43–122)
ALT SERPL W P-5'-P-CCNC: 20 U/L
ANION GAP SERPL CALCULATED.3IONS-SCNC: 7 MMOL/L (ref 5–14)
AST SERPL W P-5'-P-CCNC: 21 U/L (ref 14–36)
BASOPHILS # BLD AUTO: 0.1 THOUSANDS/ΜL (ref 0–0.1)
BASOPHILS NFR BLD AUTO: 1 % (ref 0–1)
BILIRUB SERPL-MCNC: 0.3 MG/DL
BUN SERPL-MCNC: 17 MG/DL (ref 5–25)
CALCIUM SERPL-MCNC: 9.7 MG/DL (ref 8.4–10.2)
CHLORIDE SERPL-SCNC: 103 MMOL/L (ref 97–108)
CHOLEST SERPL-MCNC: 238 MG/DL
CO2 SERPL-SCNC: 29 MMOL/L (ref 22–30)
CREAT SERPL-MCNC: 0.61 MG/DL (ref 0.6–1.2)
EOSINOPHIL # BLD AUTO: 0.3 THOUSAND/ΜL (ref 0–0.4)
EOSINOPHIL NFR BLD AUTO: 3 % (ref 0–6)
ERYTHROCYTE [DISTWIDTH] IN BLOOD BY AUTOMATED COUNT: 14.9 %
GFR SERPL CREATININE-BSD FRML MDRD: 98 ML/MIN/1.73SQ M
GLUCOSE P FAST SERPL-MCNC: 140 MG/DL (ref 70–99)
HCT VFR BLD AUTO: 37.6 % (ref 36–46)
HDLC SERPL-MCNC: 57 MG/DL
HGB BLD-MCNC: 12.6 G/DL (ref 12–16)
LDLC SERPL CALC-MCNC: 161 MG/DL
LYMPHOCYTES # BLD AUTO: 2.4 THOUSANDS/ΜL (ref 0.5–4)
LYMPHOCYTES NFR BLD AUTO: 24 % (ref 25–45)
MCH RBC QN AUTO: 30 PG (ref 26–34)
MCHC RBC AUTO-ENTMCNC: 33.5 G/DL (ref 31–36)
MCV RBC AUTO: 90 FL (ref 80–100)
MONOCYTES # BLD AUTO: 0.6 THOUSAND/ΜL (ref 0.2–0.9)
MONOCYTES NFR BLD AUTO: 6 % (ref 1–10)
NEUTROPHILS # BLD AUTO: 6.7 THOUSANDS/ΜL (ref 1.8–7.8)
NEUTS SEG NFR BLD AUTO: 67 % (ref 45–65)
NONHDLC SERPL-MCNC: 181 MG/DL
PLATELET # BLD AUTO: 353 THOUSANDS/UL (ref 150–450)
PMV BLD AUTO: 8.3 FL (ref 8.9–12.7)
POTASSIUM SERPL-SCNC: 4 MMOL/L (ref 3.6–5)
PROT SERPL-MCNC: 7.8 G/DL (ref 5.9–8.4)
RBC # BLD AUTO: 4.2 MILLION/UL (ref 4–5.2)
SODIUM SERPL-SCNC: 139 MMOL/L (ref 137–147)
TRIGL SERPL-MCNC: 98 MG/DL
WBC # BLD AUTO: 10 THOUSAND/UL (ref 4.5–11)

## 2021-05-12 PROCEDURE — 80061 LIPID PANEL: CPT

## 2021-05-12 PROCEDURE — 87389 HIV-1 AG W/HIV-1&-2 AB AG IA: CPT

## 2021-05-12 PROCEDURE — 77067 SCR MAMMO BI INCL CAD: CPT

## 2021-05-12 PROCEDURE — 85025 COMPLETE CBC W/AUTO DIFF WBC: CPT

## 2021-05-12 PROCEDURE — 36415 COLL VENOUS BLD VENIPUNCTURE: CPT

## 2021-05-12 PROCEDURE — 80053 COMPREHEN METABOLIC PANEL: CPT

## 2021-05-12 PROCEDURE — 77063 BREAST TOMOSYNTHESIS BI: CPT

## 2021-05-13 LAB — HIV 1+2 AB+HIV1 P24 AG SERPL QL IA: NORMAL

## 2021-05-17 DIAGNOSIS — F41.9 ANXIETY: ICD-10-CM

## 2021-05-18 ENCOUNTER — TELEPHONE (OUTPATIENT)
Dept: FAMILY MEDICINE CLINIC | Facility: CLINIC | Age: 61
End: 2021-05-18

## 2021-05-18 DIAGNOSIS — J01.10 ACUTE NON-RECURRENT FRONTAL SINUSITIS: Primary | ICD-10-CM

## 2021-05-18 RX ORDER — AMOXICILLIN AND CLAVULANATE POTASSIUM 875; 125 MG/1; MG/1
1 TABLET, FILM COATED ORAL EVERY 12 HOURS SCHEDULED
Qty: 14 TABLET | Refills: 0 | Status: SHIPPED | OUTPATIENT
Start: 2021-05-18 | End: 2021-05-25

## 2021-05-18 RX ORDER — BUSPIRONE HYDROCHLORIDE 5 MG/1
TABLET ORAL
Qty: 30 TABLET | Refills: 0 | Status: SHIPPED | OUTPATIENT
Start: 2021-05-18 | End: 2021-10-20 | Stop reason: ALTCHOICE

## 2021-05-18 NOTE — TELEPHONE ENCOUNTER
Called Tova back; she continues to have sinus pressure and pain, headache, and sore throat  She thought her symptoms were getting better for a few days but then they got worse again  7-day course of augmentin sent to pharmacy

## 2021-05-24 ENCOUNTER — OFFICE VISIT (OUTPATIENT)
Dept: FAMILY MEDICINE CLINIC | Facility: CLINIC | Age: 61
End: 2021-05-24
Payer: COMMERCIAL

## 2021-05-24 VITALS
RESPIRATION RATE: 16 BRPM | DIASTOLIC BLOOD PRESSURE: 80 MMHG | SYSTOLIC BLOOD PRESSURE: 130 MMHG | HEART RATE: 100 BPM | OXYGEN SATURATION: 97 % | BODY MASS INDEX: 45.36 KG/M2 | TEMPERATURE: 98 F | HEIGHT: 55 IN | WEIGHT: 196 LBS

## 2021-05-24 DIAGNOSIS — I10 ESSENTIAL HYPERTENSION: ICD-10-CM

## 2021-05-24 DIAGNOSIS — M54.2 CERVICALGIA: ICD-10-CM

## 2021-05-24 DIAGNOSIS — F41.9 ANXIETY: Primary | ICD-10-CM

## 2021-05-24 PROCEDURE — 1036F TOBACCO NON-USER: CPT | Performed by: PHYSICIAN ASSISTANT

## 2021-05-24 PROCEDURE — 3008F BODY MASS INDEX DOCD: CPT | Performed by: PHYSICIAN ASSISTANT

## 2021-05-24 PROCEDURE — 99214 OFFICE O/P EST MOD 30 MIN: CPT | Performed by: PHYSICIAN ASSISTANT

## 2021-05-24 PROCEDURE — 3075F SYST BP GE 130 - 139MM HG: CPT | Performed by: PHYSICIAN ASSISTANT

## 2021-05-24 PROCEDURE — 3079F DIAST BP 80-89 MM HG: CPT | Performed by: PHYSICIAN ASSISTANT

## 2021-05-24 PROCEDURE — 4010F ACE/ARB THERAPY RXD/TAKEN: CPT | Performed by: PHYSICIAN ASSISTANT

## 2021-05-24 RX ORDER — CARVEDILOL 12.5 MG/1
TABLET ORAL
COMMUNITY
Start: 2021-04-29 | End: 2021-05-24 | Stop reason: ALTCHOICE

## 2021-05-24 RX ORDER — LOSARTAN POTASSIUM 50 MG/1
50 TABLET ORAL 2 TIMES DAILY
Qty: 90 TABLET | Refills: 3 | Status: SHIPPED | OUTPATIENT
Start: 2021-05-24 | End: 2021-10-20

## 2021-05-24 NOTE — ASSESSMENT & PLAN NOTE
Ivory Villanuevao Debbie reports that she was taking the amlodipine 10mg and losartan 100mg and her blood pressures were dropping into the 04S systolic, and she was having associated lightheadedness and dizziness  She started splitting the losartan tablets in half and taking 50 in the morning and 50 in the evening, and her blood pressures stabilized in the 317Y-301T systolic and she no longer had any dizziness  Continue amlodipine 10mg daily and losartan 50mg BID  I encouraged her to call the office in the future if she is having any problems with new medications or dosages  She verbalized understanding and agreement with the plan of care

## 2021-05-24 NOTE — ASSESSMENT & PLAN NOTE
Diana Rubin reports that she is not taking the buspirone because she is afraid that it will make her drowsy, and she needs to take care of her sister

## 2021-05-24 NOTE — PROGRESS NOTES
Assessment/Plan:    Essential hypertension  Stable  Jaimie Sharp reports that she was taking the amlodipine 10mg and losartan 100mg and her blood pressures were dropping into the 09I systolic, and she was having associated lightheadedness and dizziness  She started splitting the losartan tablets in half and taking 50 in the morning and 50 in the evening, and her blood pressures stabilized in the 907K-706E systolic and she no longer had any dizziness  Continue amlodipine 10mg daily and losartan 50mg BID  I encouraged her to call the office in the future if she is having any problems with new medications or dosages  She verbalized understanding and agreement with the plan of care  Anxiety  Tova reports that she is not taking the buspirone because she is afraid that it will make her drowsy, and she needs to take care of her sister  Diagnoses and all orders for this visit:    Anxiety    Essential hypertension  -     losartan (COZAAR) 50 mg tablet; Take 1 tablet (50 mg total) by mouth 2 (two) times a day    Other orders  -     Discontinue: carvedilol (COREG) 12 5 mg tablet          Subjective:      Patient ID: Christi Hernandez is a 63 y o  female  Jaimie Sharp presents to the office today for blood pressure check  She has no acute complaints  The following portions of the patient's history were reviewed and updated as appropriate: allergies, current medications, past family history, past medical history, past social history, past surgical history and problem list     Review of Systems   Constitutional: Negative for chills, fatigue and fever  HENT: Negative for congestion, rhinorrhea, sinus pressure, sinus pain, sneezing and sore throat  Respiratory: Negative for cough, shortness of breath and wheezing  Cardiovascular: Negative for chest pain, palpitations and leg swelling  Gastrointestinal: Negative for abdominal pain, constipation, diarrhea, nausea and vomiting     Genitourinary: Negative for dysuria and hematuria  Musculoskeletal: Negative for arthralgias, back pain, myalgias, neck pain and neck stiffness  Neurological: Negative for dizziness, seizures and headaches  Psychiatric/Behavioral: Negative for sleep disturbance  Objective:      /80 (BP Location: Left arm, Patient Position: Sitting, Cuff Size: Standard)   Pulse 100   Temp 98 °F (36 7 °C) (Temporal)   Resp 16   Ht 4' 4" (1 321 m)   Wt 88 9 kg (196 lb)   SpO2 97%   Breastfeeding No   BMI 50 96 kg/m²          Physical Exam  Vitals signs reviewed  Constitutional:       General: She is not in acute distress  Appearance: She is obese  She is not toxic-appearing  HENT:      Head: Normocephalic and atraumatic  Eyes:      Extraocular Movements: Extraocular movements intact  Conjunctiva/sclera: Conjunctivae normal    Cardiovascular:      Rate and Rhythm: Normal rate and regular rhythm  Pulses: Normal pulses  Heart sounds: Normal heart sounds  No murmur  No gallop  Pulmonary:      Effort: Pulmonary effort is normal  No respiratory distress  Breath sounds: Normal breath sounds  No stridor  No wheezing, rhonchi or rales  Musculoskeletal: Normal range of motion  Right lower leg: No edema  Left lower leg: No edema  Skin:     General: Skin is warm and dry  Neurological:      Mental Status: She is alert and oriented to person, place, and time  Psychiatric:         Mood and Affect: Mood normal          Behavior: Behavior normal          Thought Content:  Thought content normal

## 2021-05-25 ENCOUNTER — HOSPITAL ENCOUNTER (OUTPATIENT)
Dept: MAMMOGRAPHY | Facility: CLINIC | Age: 61
Discharge: HOME | End: 2021-05-25
Payer: COMMERCIAL

## 2021-05-25 DIAGNOSIS — R92.8 ABNORMAL MAMMOGRAM: ICD-10-CM

## 2021-05-25 PROCEDURE — 76642 ULTRASOUND BREAST LIMITED: CPT

## 2021-05-25 RX ORDER — DICLOFENAC SODIUM 75 MG/1
TABLET, DELAYED RELEASE ORAL
Qty: 100 TABLET | Refills: 0 | Status: SHIPPED | OUTPATIENT
Start: 2021-05-25 | End: 2021-07-14 | Stop reason: ALTCHOICE

## 2021-06-14 DIAGNOSIS — F41.9 ANXIETY: ICD-10-CM

## 2021-06-15 RX ORDER — BUSPIRONE HYDROCHLORIDE 5 MG/1
TABLET ORAL
Qty: 30 TABLET | Refills: 0 | OUTPATIENT
Start: 2021-06-15

## 2021-07-12 ENCOUNTER — APPOINTMENT (OUTPATIENT)
Dept: LAB | Facility: HOSPITAL | Age: 61
End: 2021-07-12
Payer: COMMERCIAL

## 2021-07-12 ENCOUNTER — HOSPITAL ENCOUNTER (OUTPATIENT)
Dept: RADIOLOGY | Facility: HOSPITAL | Age: 61
Discharge: HOME | End: 2021-07-12
Payer: COMMERCIAL

## 2021-07-12 DIAGNOSIS — M25.572 LEFT ANKLE PAIN, UNSPECIFIED CHRONICITY: ICD-10-CM

## 2021-07-12 DIAGNOSIS — M79.672 LEFT FOOT PAIN: ICD-10-CM

## 2021-07-12 LAB
ALBUMIN SERPL BCP-MCNC: 4.3 G/DL (ref 3–5.2)
ALP SERPL-CCNC: 78 U/L (ref 43–122)
ALT SERPL W P-5'-P-CCNC: 18 U/L
ANION GAP SERPL CALCULATED.3IONS-SCNC: 10 MMOL/L (ref 5–14)
AST SERPL W P-5'-P-CCNC: 20 U/L (ref 14–36)
BILIRUB SERPL-MCNC: 0.51 MG/DL
BUN SERPL-MCNC: 15 MG/DL (ref 5–25)
CALCIUM SERPL-MCNC: 9.5 MG/DL (ref 8.4–10.2)
CHLORIDE SERPL-SCNC: 105 MMOL/L (ref 97–108)
CO2 SERPL-SCNC: 26 MMOL/L (ref 22–30)
CREAT SERPL-MCNC: 0.63 MG/DL (ref 0.6–1.2)
GFR SERPL CREATININE-BSD FRML MDRD: 96 ML/MIN/1.73SQ M
GLUCOSE P FAST SERPL-MCNC: 149 MG/DL (ref 70–99)
POTASSIUM SERPL-SCNC: 3.9 MMOL/L (ref 3.6–5)
PROT SERPL-MCNC: 7.4 G/DL (ref 5.9–8.4)
SODIUM SERPL-SCNC: 141 MMOL/L (ref 137–147)
URATE SERPL-MCNC: 5.6 MG/DL (ref 2.7–7.5)

## 2021-07-12 PROCEDURE — 36415 COLL VENOUS BLD VENIPUNCTURE: CPT

## 2021-07-12 PROCEDURE — 73610 X-RAY EXAM OF ANKLE: CPT

## 2021-07-12 PROCEDURE — 73630 X-RAY EXAM OF FOOT: CPT

## 2021-07-12 PROCEDURE — 80053 COMPREHEN METABOLIC PANEL: CPT

## 2021-07-12 PROCEDURE — 84550 ASSAY OF BLOOD/URIC ACID: CPT

## 2021-07-14 ENCOUNTER — OFFICE VISIT (OUTPATIENT)
Dept: FAMILY MEDICINE CLINIC | Facility: CLINIC | Age: 61
End: 2021-07-14
Payer: COMMERCIAL

## 2021-07-14 VITALS
WEIGHT: 202 LBS | RESPIRATION RATE: 16 BRPM | HEIGHT: 55 IN | OXYGEN SATURATION: 99 % | SYSTOLIC BLOOD PRESSURE: 140 MMHG | BODY MASS INDEX: 46.75 KG/M2 | DIASTOLIC BLOOD PRESSURE: 80 MMHG | TEMPERATURE: 98 F | HEART RATE: 80 BPM

## 2021-07-14 DIAGNOSIS — R60.0 LOCALIZED EDEMA: ICD-10-CM

## 2021-07-14 DIAGNOSIS — E11.65 UNCONTROLLED TYPE 2 DIABETES MELLITUS WITH HYPERGLYCEMIA (HCC): Primary | ICD-10-CM

## 2021-07-14 DIAGNOSIS — E66.01 MORBID OBESITY WITH BMI OF 50.0-59.9, ADULT (HCC): ICD-10-CM

## 2021-07-14 DIAGNOSIS — M79.672 LEFT FOOT PAIN: ICD-10-CM

## 2021-07-14 LAB — SL AMB POCT HEMOGLOBIN AIC: 6.9 (ref ?–6.5)

## 2021-07-14 PROCEDURE — 3044F HG A1C LEVEL LT 7.0%: CPT | Performed by: FAMILY MEDICINE

## 2021-07-14 PROCEDURE — 99214 OFFICE O/P EST MOD 30 MIN: CPT | Performed by: FAMILY MEDICINE

## 2021-07-14 PROCEDURE — 3079F DIAST BP 80-89 MM HG: CPT | Performed by: FAMILY MEDICINE

## 2021-07-14 PROCEDURE — 3077F SYST BP >= 140 MM HG: CPT | Performed by: FAMILY MEDICINE

## 2021-07-14 PROCEDURE — 1036F TOBACCO NON-USER: CPT | Performed by: FAMILY MEDICINE

## 2021-07-14 PROCEDURE — 3008F BODY MASS INDEX DOCD: CPT | Performed by: FAMILY MEDICINE

## 2021-07-14 PROCEDURE — 83036 HEMOGLOBIN GLYCOSYLATED A1C: CPT | Performed by: FAMILY MEDICINE

## 2021-07-14 RX ORDER — SENNOSIDES 8.6 MG
650 CAPSULE ORAL 2 TIMES DAILY
Qty: 60 TABLET | Refills: 5 | Status: SHIPPED | OUTPATIENT
Start: 2021-07-14 | End: 2021-10-20 | Stop reason: ALTCHOICE

## 2021-07-14 NOTE — PROGRESS NOTES
Assessment/Plan:    Main concern is her severe obesity with BMI of 52    her diabetic control seems decreased  She does not exercise of following a specific diet  Recommended patient to follow up with endocrinologist and diabetes educator that she failed to  I expressed to patient that her compliance with recommendations makes her a very good candidate for bariatric surgery  However her BMI put her and the criteria for qualification  If she decides to have bariatric surgery we will refer her to bariatric team to assess her psychologically and educate her about compliance and weight control  She is having increased difficulty to take care of her sister due to chronic conditions in both  I am requesting visiting nurses to assess safety and compliance with treatment, check blood sugar and blood pressure at home  No problem-specific Assessment & Plan notes found for this encounter  Diagnoses and all orders for this visit:    Morbid obesity with BMI of 50 0-59 9, adult ([facility] )    Uncontrolled type 2 diabetes mellitus with hyperglycemia ([facility] )  -     POCT hemoglobin A1c    Left foot pain  -     acetaminophen (TYLENOL) 650 mg CR tablet; Take 1 tablet (650 mg total) by mouth 2 (two) times a day    Other orders  -     ciclopirox (LOPROX) 0 77 % cream          Subjective:      Patient ID: Manuel Haney is a 57 y o  female  HPI  Patient is here to follow-up diabetes and hypertension  She states blood sugar at home has been normal   Blood pressure has been slightly elevated and she compliance with the therapy  She has difficulty walking due to left ankle pain  He has severe obese patient the find himself more difficulty every day move around and is thinking about bariatric surgery      The following portions of the patient's history were reviewed and updated as appropriate: allergies, current medications, past family history, past medical history, past social history, past surgical history and problem list     Review of Systems   Constitutional: Negative for diaphoresis, fatigue, fever and unexpected weight change  Respiratory: Negative for cough, chest tightness, shortness of breath and wheezing  Cardiovascular: Negative for chest pain, palpitations and leg swelling  Gastrointestinal: Negative for abdominal pain, blood in stool and constipation  Musculoskeletal: Positive for arthralgias  Neurological: Negative for dizziness, syncope, light-headedness and headaches  Hematological: Does not bruise/bleed easily  Psychiatric/Behavioral: Negative for behavioral problems, self-injury and sleep disturbance  The patient is not nervous/anxious  Objective:      /80 (BP Location: Left arm, Patient Position: Sitting, Cuff Size: Standard)   Pulse 80   Temp 98 °F (36 7 °C) (Temporal)   Resp 16   Ht 4' 4" (1 321 m)   Wt 91 6 kg (202 lb)   SpO2 99%   Breastfeeding No   BMI 52 52 kg/m²          Physical Exam  Constitutional:       Comments: Body mass index is 52 52 kg/m²  HENT:      Head: Normocephalic  Eyes:      Pupils: Pupils are equal, round, and reactive to light  Cardiovascular:      Rate and Rhythm: Normal rate and regular rhythm  Pulmonary:      Effort: Pulmonary effort is normal    Abdominal:      Palpations: Abdomen is soft  Musculoskeletal:         General: Tenderness (bilateral ankles and edema ) present  Normal range of motion  Cervical back: Neck supple  Skin:     General: Skin is warm and dry  Neurological:      Mental Status: She is alert  Psychiatric:         Behavior: Behavior normal          BMI Counseling: Body mass index is 52 52 kg/m²  The BMI is above normal  Nutrition recommendations include decreasing soda and/or juice intake, moderation in carbohydrate intake, increasing intake of lean protein, reducing intake of saturated fat and trans fat and reducing intake of cholesterol  Exercise recommendations include exercising 3-5 times per week

## 2021-07-14 NOTE — LETTER
July 14, 2021     Patient: Judit Weinberg   YOB: 1960   Date of Visit: 7/14/2021     Ambulatory referral to home health Care:    Diagnosis:   Patient Active Problem List   Diagnosis    Essential hypertension    Hyperlipidemia    LFT elevation    Chronic low back pain with right-sided sciatica    Uncontrolled type 2 diabetes mellitus with hyperglycemia ([facility] )    Diabetic gastroparesis associated with type 2 diabetes mellitus ([facility] )    Acute non-recurrent frontal sinusitis    Anxiety    Left foot pain    Morbid obesity with BMI of 50 0-59 9, adult ([facility] )    Localized edema     Reason for referral:  Skilled nursing services for chronic conditions  Face-to-Face encounter: 07/14/2021    patient is home bound due to:obesity and ankle pain ,    Service requested: assess compliance, safety/treat/educate      Sincerely,          Griselda Oas, MD

## 2021-07-22 DIAGNOSIS — E11.65 UNCONTROLLED TYPE 2 DIABETES MELLITUS WITH HYPERGLYCEMIA (HCC): ICD-10-CM

## 2021-07-26 DIAGNOSIS — I10 HYPERTENSION, UNSPECIFIED TYPE: ICD-10-CM

## 2021-07-26 RX ORDER — BLOOD-GLUCOSE METER
KIT MISCELLANEOUS
Qty: 100 EACH | Refills: 0 | Status: SHIPPED | OUTPATIENT
Start: 2021-07-26

## 2021-07-27 RX ORDER — CARVEDILOL 12.5 MG/1
TABLET ORAL
Qty: 180 TABLET | Refills: 2 | Status: SHIPPED | OUTPATIENT
Start: 2021-07-27 | End: 2021-10-20 | Stop reason: ALTCHOICE

## 2021-08-25 RX ORDER — LOSARTAN POTASSIUM 100 MG/1
TABLET ORAL
COMMUNITY
Start: 2021-07-16 | End: 2021-10-20

## 2021-08-26 ENCOUNTER — HOSPITAL ENCOUNTER (OUTPATIENT)
Dept: RADIOLOGY | Facility: HOSPITAL | Age: 61
Discharge: HOME | End: 2021-08-26
Payer: COMMERCIAL

## 2021-08-26 ENCOUNTER — OFFICE VISIT (OUTPATIENT)
Dept: FAMILY MEDICINE CLINIC | Facility: CLINIC | Age: 61
End: 2021-08-26
Payer: COMMERCIAL

## 2021-08-26 VITALS
HEIGHT: 55 IN | RESPIRATION RATE: 16 BRPM | OXYGEN SATURATION: 98 % | DIASTOLIC BLOOD PRESSURE: 80 MMHG | SYSTOLIC BLOOD PRESSURE: 138 MMHG | HEART RATE: 88 BPM | WEIGHT: 195 LBS | TEMPERATURE: 97.9 F | BODY MASS INDEX: 45.13 KG/M2

## 2021-08-26 DIAGNOSIS — E11.65 UNCONTROLLED TYPE 2 DIABETES MELLITUS WITH HYPERGLYCEMIA (HCC): Primary | ICD-10-CM

## 2021-08-26 DIAGNOSIS — M54.6 THORACIC SPINE PAIN: ICD-10-CM

## 2021-08-26 DIAGNOSIS — M54.50 ACUTE RIGHT-SIDED LOW BACK PAIN WITHOUT SCIATICA: ICD-10-CM

## 2021-08-26 DIAGNOSIS — Z00.00 MEDICARE ANNUAL WELLNESS VISIT, SUBSEQUENT: ICD-10-CM

## 2021-08-26 PROCEDURE — 72072 X-RAY EXAM THORAC SPINE 3VWS: CPT

## 2021-08-26 PROCEDURE — G0439 PPPS, SUBSEQ VISIT: HCPCS | Performed by: FAMILY MEDICINE

## 2021-08-26 PROCEDURE — 3008F BODY MASS INDEX DOCD: CPT | Performed by: FAMILY MEDICINE

## 2021-08-26 PROCEDURE — 99214 OFFICE O/P EST MOD 30 MIN: CPT | Performed by: FAMILY MEDICINE

## 2021-08-26 NOTE — PROGRESS NOTES
Assessment/Plan:    No problem-specific Assessment & Plan notes found for this encounter  Diagnoses and all orders for this visit:    Uncontrolled type 2 diabetes mellitus with hyperglycemia ([facility] )  -     Glucometer test strips  -     Ambulatory referral to Diabetic Education; Future    Acute right-sided low back pain without sciatica    Thoracic spine pain  -     XR spine thoracic 3 vw; Future  -     traMADol-acetaminophen (ULTRACET) 37 5-325 mg per tablet; Take 1 tablet by mouth every 8 (eight) hours as needed for moderate pain    Other orders  -     losartan (COZAAR) 100 MG tablet          Subjective:      Patient ID: Anny Jung is a 57 y o  female  Anny Jung 57 y o  complaining of   Chronic pain in Middle back; started about 6 month(s) ago  The pain is continuos and has been gradually worsening since onset  The quality of the pain is described as aching  The pain radiates towards the right  The pain is at a severity of 5/10  The symptoms are aggravated by movement  Associated symptoms include numbness, paresthesias and tingling  She had tried multiple alternatives without improved her symptoms  The following portions of the patient's history were reviewed and updated as appropriate: allergies, current medications, past family history, past medical history, past social history, past surgical history and problem list     Review of Systems   Constitutional: Negative for diaphoresis, fatigue, fever and unexpected weight change  Respiratory: Negative for cough, chest tightness, shortness of breath and wheezing  Cardiovascular: Negative for chest pain, palpitations and leg swelling  Gastrointestinal: Negative for abdominal pain, blood in stool and constipation  Musculoskeletal: Positive for back pain  Neurological: Negative for dizziness, syncope, light-headedness and headaches  Hematological: Does not bruise/bleed easily     Psychiatric/Behavioral: Negative for behavioral problems, self-injury and sleep disturbance  The patient is not nervous/anxious  Objective:      /80 (BP Location: Left arm, Patient Position: Sitting, Cuff Size: Standard)   Pulse 88   Temp 97 9 °F (36 6 °C) (Temporal)   Resp 16   Ht 4' 4" (1 321 m)   Wt 88 5 kg (195 lb)   SpO2 98%   BMI 50 70 kg/m²          Physical Exam  HENT:      Head: Normocephalic  Eyes:      Pupils: Pupils are equal, round, and reactive to light  Cardiovascular:      Rate and Rhythm: Normal rate and regular rhythm  Pulmonary:      Effort: Pulmonary effort is normal    Abdominal:      Palpations: Abdomen is soft  Musculoskeletal:         General: Tenderness (right sided thoracic spine, Worsening when pressure over spinal processes) present  Cervical back: Neck supple  Skin:     General: Skin is warm and dry  Neurological:      Mental Status: She is alert     Psychiatric:         Behavior: Behavior normal

## 2021-08-31 DIAGNOSIS — I10 ESSENTIAL HYPERTENSION: ICD-10-CM

## 2021-08-31 RX ORDER — AMLODIPINE BESYLATE 10 MG/1
TABLET ORAL
Qty: 30 TABLET | Refills: 4 | Status: SHIPPED | OUTPATIENT
Start: 2021-08-31 | End: 2021-10-20 | Stop reason: SDUPTHER

## 2021-09-07 PROBLEM — M54.6 THORACIC SPINE PAIN: Status: ACTIVE | Noted: 2021-09-07

## 2021-09-07 PROBLEM — Z00.00 MEDICARE ANNUAL WELLNESS VISIT, SUBSEQUENT: Status: ACTIVE | Noted: 2018-07-20

## 2021-09-07 PROBLEM — M54.50 ACUTE RIGHT-SIDED LOW BACK PAIN WITHOUT SCIATICA: Status: ACTIVE | Noted: 2021-09-07

## 2021-09-07 NOTE — PROGRESS NOTES
Assessment/Plan:    No problem-specific Assessment & Plan notes found for this encounter  Diagnoses and all orders for this visit:    Uncontrolled type 2 diabetes mellitus with hyperglycemia ([facility] )  -     Glucometer test strips  -     Ambulatory referral to Diabetic Education; Future    Acute right-sided low back pain without sciatica    Thoracic spine pain  -     XR spine thoracic 3 vw; Future  -     traMADol-acetaminophen (ULTRACET) 37 5-325 mg per tablet; Take 1 tablet by mouth every 8 (eight) hours as needed for moderate pain    Other orders  -     losartan (COZAAR) 100 MG tablet          Subjective:      Patient ID: Jaylen Hinton is a 57 y o  female      HPI    The following portions of the patient's history were reviewed and updated as appropriate: allergies, current medications, past family history, past medical history, past social history, past surgical history and problem list     Review of Systems      Objective:      /80 (BP Location: Left arm, Patient Position: Sitting, Cuff Size: Standard)   Pulse 88   Temp 97 9 °F (36 6 °C) (Temporal)   Resp 16   Ht 4' 4" (1 321 m)   Wt 88 5 kg (195 lb)   SpO2 98%   BMI 50 70 kg/m²          Physical Exam

## 2021-09-07 NOTE — PROGRESS NOTES
Assessment and Plan:     Problem List Items Addressed This Visit        Endocrine    Uncontrolled type 2 diabetes mellitus with hyperglycemia ([address] ) - Primary    Relevant Orders    Glucometer test strips    Ambulatory referral to Diabetic Education      Other Visit Diagnoses     Acute right-sided low back pain without sciatica        Thoracic spine pain        Relevant Medications    traMADol-acetaminophen (ULTRACET) 37 5-325 mg per tablet    Other Relevant Orders    XR spine thoracic 3 vw (Completed)           Preventive health issues were discussed with patient, and age appropriate screening tests were ordered as noted in patient's After Visit Summary  Personalized health advice and appropriate referrals for health education or preventive services given if needed, as noted in patient's After Visit Summary  History of Present Illness:     Patient presents for Welcome to Medicare visit       Patient Care Team:  Jinny Marrero MD as PCP - General (Family Medicine)  Nicola Flores DO as Endoscopist     Review of Systems:     Review of Systems   Problem List:     Patient Active Problem List   Diagnosis    Essential hypertension    Hyperlipidemia    LFT elevation    Chronic low back pain with right-sided sciatica    Uncontrolled type 2 diabetes mellitus with hyperglycemia ([address] )    Diabetic gastroparesis associated with type 2 diabetes mellitus ([address] )    Acute non-recurrent frontal sinusitis    Anxiety    Left foot pain    Morbid obesity with BMI of 50 0-59 9, adult ([address] )    Localized edema      Past Medical and Surgical History:     Past Medical History:   Diagnosis Date    Back problem     herniated discs    Chronic pain disorder     back    Diabetes mellitus ([address] )     Diverticulosis     Hyperglycemia     Hyperlipidemia     Hypertension     Impacted cerumen     Obesity     Palpitations      Past Surgical History:   Procedure Laterality Date    CARPAL TUNNEL RELEASE Right 1983    DIAGNOSTIC LAPAROSCOPY      ESOPHAGOGASTRODUODENOSCOPY  10/2018    gastroduodenitis    PERIPHERAL ANGIOGRAM      LA COLONOSCOPY FLX DX W/COLLJ SPEC WHEN PFRMD N/A 9/12/2018    dr chowdhury, diverticulosis    LA ESOPHAGOGASTRODUODENOSCOPY TRANSORAL DIAGNOSTIC N/A 10/10/2018    Procedure: EGD;  Surgeon: Meredith Olmstead DO;  Location: [address] [place] [address] GI LAB; Service: General      Family History:     Family History   Problem Relation Age of Onset    Kidney disease Mother     Hypertension Mother     Coronary artery disease Father         premature    Diabetes Sister     Coronary artery disease Sister     Coronary artery disease Maternal Grandfather     Diabetes Paternal Grandfather     Coronary artery disease Paternal Grandfather     Breast cancer Paternal Aunt       Social History:     Social History     Socioeconomic History    Marital status:      Spouse name: None    Number of children: None    Years of education: None    Highest education level: None   Occupational History    None   Tobacco Use    Smoking status: Never Smoker    Smokeless tobacco: Never Used   Substance and Sexual Activity    Alcohol use: No    Drug use: No    Sexual activity: Yes     Partners: Male   Other Topics Concern    None   Social History Narrative    None     Social Determinants of Health     Financial Resource Strain:     Difficulty of Paying Living Expenses:    Food Insecurity:     Worried About Running Out of Food in the Last Year:     Ran Out of Food in the Last Year:    Transportation Needs:     Lack of Transportation (Medical):      Lack of Transportation (Non-Medical):    Physical Activity:     Days of Exercise per Week:     Minutes of Exercise per Session:    Stress:     Feeling of Stress :    Social Connections:     Frequency of Communication with Friends and Family:     Frequency of Social Gatherings with Friends and Family:     Attends Orthodox Services:     Active Member of Clubs or Organizations:     Attends Club or Organization Meetings:     Marital Status:    Intimate Partner Violence:     Fear of Current or Ex-Partner:     Emotionally Abused:     Physically Abused:     Sexually Abused:       Medications and Allergies:     Current Outpatient Medications   Medication Sig Dispense Refill    acetaminophen (TYLENOL) 650 mg CR tablet Take 1 tablet (650 mg total) by mouth 2 (two) times a day 60 tablet 5    amLODIPine (NORVASC) 10 mg tablet TAKE ONE TABLET BY MOUTH ONCE DAILY 30 tablet 4    atorvastatin (LIPITOR) 40 mg tablet Take 1 tablet (40 mg total) by mouth daily 90 tablet 3    busPIRone (BUSPAR) 5 mg tablet TAKE ONE TABLET BY MOUTH ONCE DAILY AT BEDTIME 30 tablet 0    carvedilol (COREG) 12 5 mg tablet TAKE ONE TABLET BY MOUTH TWICE A DAY WITH MEALS 180 tablet 2    ciclopirox (LOPROX) 0 77 % cream       glucose blood (FREESTYLE LITE) test strip To check blood sugar once a day 100 each 0    glucose monitoring kit (FREESTYLE) monitoring kit Check blood sugar twice a day 1 each 0    losartan (COZAAR) 100 MG tablet       losartan (COZAAR) 50 mg tablet Take 1 tablet (50 mg total) by mouth 2 (two) times a day 90 tablet 3    metFORMIN (GLUCOPHAGE) 500 mg tablet Take 1 tablet (500 mg total) by mouth 2 (two) times a day with meals 180 tablet 3    traMADol-acetaminophen (ULTRACET) 37 5-325 mg per tablet Take 1 tablet by mouth every 8 (eight) hours as needed for moderate pain 90 tablet 0     No current facility-administered medications for this visit       Allergies   Allergen Reactions    Vicodin [Hydrocodone-Acetaminophen] Angioedema    Percocet [Oxycodone-Acetaminophen] GI Intolerance    Aspirin GI Intolerance    Diclofenac Sodium Rash      Immunizations:     Immunization History   Administered Date(s) Administered    Hep B, Adolescent or Pediatric 07/17/2017, 12/11/2017    Hep B, adult 04/09/2018    INFLUENZA 10/08/2012, 12/11/2017    Influenza, recombinant, quadrivalent,injectable, preservative free 02/22/2019, 10/22/2019, 10/02/2020    Pneumococcal Polysaccharide PPV23 11/23/2018    SARS-CoV-2 / COVID-19 mRNA IM (Ashleigh Montoya) 03/30/2021, 05/03/2021    Td (adult), adsorbed 01/22/2018    Tdap 01/22/2018, 01/22/2018      Health Maintenance:         Topic Date Due    Cervical Cancer Screening  Never done    Breast Cancer Screening: Mammogram  05/12/2022    Colorectal Cancer Screening  09/12/2028    HIV Screening  Completed    Hepatitis C Screening  Completed         Topic Date Due    Influenza Vaccine (1) 09/01/2021      Medicare Screening Tests and Risk Assessments:     Guillaume Brunner is here for her Subsequent Wellness visit  Health Risk Assessment:   Patient rates overall health as good  Patient feels that their physical health rating is same  Eyesight was rated as same  Hearing was rated as same  Patient feels that their emotional and mental health rating is same  Pain experienced in the last 7 days has been some  Patient's pain rating has been 7/10  Patient states that she has experienced no weight loss or gain in last 6 months  Fall Risk Screening: In the past year, patient has experienced: no history of falling in past year      Urinary Incontinence Screening:   Patient has not leaked urine accidently in the last six months  Home Safety:  Patient does not have trouble with stairs inside or outside of their home  Patient has working smoke alarms and has working carbon monoxide detector  Home safety hazards include: none  Nutrition:   Current diet is Regular and Limited junk food  Medications:   Patient is currently taking over-the-counter supplements  OTC medications include: see medication list  Patient is able to manage medications  Activities of Daily Living (ADLs)/Instrumental Activities of Daily Living (IADLs):   Walk and transfer into and out of bed and chair?: Yes  Dress and groom yourself?: Yes    Bathe or shower yourself?: Yes    Feed yourself?  Yes  Do your laundry/housekeeping?: Yes  Manage your money, pay your bills and track your expenses?: Yes  Make your own meals?: Yes    Do your own shopping?: Yes    Previous Hospitalizations:   Any hospitalizations or ED visits within the last 12 months?: No      Advance Care Planning:   Living will: No    Durable POA for healthcare: No    Advanced directive: No    Advanced directive counseling given: Yes    Five wishes given: Yes    Patient declined ACP directive: No    End of Life Decisions reviewed with patient: Yes    Provider agrees with end of life decisions: Yes      Cognitive Screening:   Provider or family/friend/caregiver concerned regarding cognition?: No    PREVENTIVE SCREENINGS      Cardiovascular Screening:    General: Screening Not Indicated and History Lipid Disorder      Diabetes Screening:     General: Screening Not Indicated and History Diabetes      Colorectal Cancer Screening:     General: Screening Current      Breast Cancer Screening:     General: Screening Current      Cervical Cancer Screening:    General: Risks and Benefits Discussed      Osteoporosis Screening:    General: Screening Not Indicated      Abdominal Aortic Aneurysm (AAA) Screening:        General: Screening Not Indicated      Lung Cancer Screening:     General: Screening Not Indicated      Hepatitis C Screening:    General: Screening Current    Screening, Brief Intervention, and Referral to Treatment (SBIRT)    Screening  Typical number of drinks in a day: 0    Other Counseling Topics:   Alcohol use counseling, car/seat belt/driving safety, skin self-exam, sunscreen and calcium and vitamin D intake and regular weightbearing exercise           No exam data present     Physical Exam:     /80 (BP Location: Left arm, Patient Position: Sitting, Cuff Size: Standard)   Pulse 88   Temp 97 9 °F (36 6 °C) (Temporal)   Resp 16   Ht 4' 4" (1 321 m)   Wt 88 5 kg (195 lb)   SpO2 98%   BMI 50 70 kg/m²     Physical Exam     Loreta Sanon MD

## 2021-09-07 NOTE — PATIENT INSTRUCTIONS
Medicare Preventive Visit Patient Instructions  Thank you for completing your Welcome to Medicare Visit or Medicare Annual Wellness Visit today  Your next wellness visit will be due in one year (9/8/2022)  The screening/preventive services that you may require over the next 5-10 years are detailed below  Some tests may not apply to you based off risk factors and/or age  Screening tests ordered at today's visit but not completed yet may show as past due  Also, please note that scanned in results may not display below  Preventive Screenings:  Service Recommendations Previous Testing/Comments   Colorectal Cancer Screening  * Colonoscopy    * Fecal Occult Blood Test (FOBT)/Fecal Immunochemical Test (FIT)  * Fecal DNA/Cologuard Test  * Flexible Sigmoidoscopy Age: 54-65 years old   Colonoscopy: every 10 years (may be performed more frequently if at higher risk)  OR  FOBT/FIT: every 1 year  OR  Cologuard: every 3 years  OR  Sigmoidoscopy: every 5 years  Screening may be recommended earlier than age 48 if at higher risk for colorectal cancer  Also, an individualized decision between you and your healthcare provider will decide whether screening between the ages of 74-80 would be appropriate  Colonoscopy: 09/12/2018  FOBT/FIT: Not on file  Cologuard: Not on file  Sigmoidoscopy: Not on file          Breast Cancer Screening Age: 36 years old  Frequency: every 1-2 years  Not required if history of left and right mastectomy Mammogram: 05/12/2021        Cervical Cancer Screening Between the ages of 21-29, pap smear recommended once every 3 years  Between the ages of 33-67, can perform pap smear with HPV co-testing every 5 years     Recommendations may differ for women with a history of total hysterectomy, cervical cancer, or abnormal pap smears in past  Pap Smear: Not on file        Hepatitis C Screening Once for adults born between [facility]  More frequently in patients at high risk for Hepatitis C Hep C Antibody: 08/17/2018        Diabetes Screening 1-2 times per year if you're at risk for diabetes or have pre-diabetes Fasting glucose: 149 mg/dL   A1C: 6 9        Cholesterol Screening Once every 5 years if you don't have a lipid disorder  May order more often based on risk factors  Lipid panel: 05/12/2021          Other Preventive Screenings Covered by Medicare:  1  Abdominal Aortic Aneurysm (AAA) Screening: covered once if your at risk  You're considered to be at risk if you have a family history of AAA  2  Lung Cancer Screening: covers low dose CT scan once per year if you meet all of the following conditions: (1) Age 50-69; (2) No signs or symptoms of lung cancer; (3) Current smoker or have quit smoking within the last 15 years; (4) You have a tobacco smoking history of at least 30 pack years (packs per day multiplied by number of years you smoked); (5) You get a written order from a healthcare provider  3  Glaucoma Screening: covered annually if you're considered high risk: (1) You have diabetes OR (2) Family history of glaucoma OR (3)  aged 48 and older OR (3)  American aged 72 and older  3  Osteoporosis Screening: covered every 2 years if you meet one of the following conditions: (1) You're estrogen deficient and at risk for osteoporosis based off medical history and other findings; (2) Have a vertebral abnormality; (3) On glucocorticoid therapy for more than 3 months; (4) Have primary hyperparathyroidism; (5) On osteoporosis medications and need to assess response to drug therapy  · Last bone density test (DXA Scan): Not on file  5  HIV Screening: covered annually if you're between the age of 12-76  Also covered annually if you are younger than 13 and older than 72 with risk factors for HIV infection  For pregnant patients, it is covered up to 3 times per pregnancy      Immunizations:  Immunization Recommendations   Influenza Vaccine Annual influenza vaccination during flu season is recommended for all persons aged >= 6 months who do not have contraindications   Pneumococcal Vaccine (Prevnar and Pneumovax)  * Prevnar = PCV13  * Pneumovax = PPSV23   Adults 25-60 years old: 1-3 doses may be recommended based on certain risk factors  Adults 72 years old: Prevnar (PCV13) vaccine recommended followed by Pneumovax (PPSV23) vaccine  If already received PPSV23 since turning 65, then PCV13 recommended at least one year after PPSV23 dose  Hepatitis B Vaccine 3 dose series if at intermediate or high risk (ex: diabetes, end stage renal disease, liver disease)   Tetanus (Td) Vaccine - COST NOT COVERED BY MEDICARE PART B Following completion of primary series, a booster dose should be given every 10 years to maintain immunity against tetanus  Td may also be given as tetanus wound prophylaxis  Tdap Vaccine - COST NOT COVERED BY MEDICARE PART B Recommended at least once for all adults  For pregnant patients, recommended with each pregnancy  Shingles Vaccine (Shingrix) - COST NOT COVERED BY MEDICARE PART B  2 shot series recommended in those aged 48 and above     Health Maintenance Due:      Topic Date Due    Cervical Cancer Screening  Never done    Breast Cancer Screening: Mammogram  05/12/2022    Colorectal Cancer Screening  09/12/2028    HIV Screening  Completed    Hepatitis C Screening  Completed     Immunizations Due:      Topic Date Due    Influenza Vaccine (1) 09/01/2021     Advance Directives   What are advance directives? Advance directives are legal documents that state your wishes and plans for medical care  These plans are made ahead of time in case you lose your ability to make decisions for yourself  Advance directives can apply to any medical decision, such as the treatments you want, and if you want to donate organs  What are the types of advance directives? There are many types of advance directives, and each state has rules about how to use them   You may choose a combination of any of the following:  · Living will: This is a written record of the treatment you want  You can also choose which treatments you do not want, which to limit, and which to stop at a certain time  This includes surgery, medicine, IV fluid, and tube feedings  · Durable power of  for healthcare [facility]): This is a written record that states who you want to make healthcare choices for you when you are unable to make them for yourself  This person, called a proxy, is usually a family member or a friend  You may choose more than 1 proxy  · Do not resuscitate (DNR) order:  A DNR order is used in case your heart stops beating or you stop breathing  It is a request not to have certain forms of treatment, such as CPR  A DNR order may be included in other types of advance directives  · Medical directive: This covers the care that you want if you are in a coma, near death, or unable to make decisions for yourself  You can list the treatments you want for each condition  Treatment may include pain medicine, surgery, blood transfusions, dialysis, IV or tube feedings, and a ventilator (breathing machine)  · Values history: This document has questions about your views, beliefs, and how you feel and think about life  This information can help others choose the care that you would choose  Why are advance directives important? An advance directive helps you control your care  Although spoken wishes may be used, it is better to have your wishes written down  Spoken wishes can be misunderstood, or not followed  Treatments may be given even if you do not want them  An advance directive may make it easier for your family to make difficult choices about your care  Weight Management   Why it is important to manage your weight:  Being overweight increases your risk of health conditions such as heart disease, high blood pressure, type 2 diabetes, and certain types of cancer   It can also increase your risk for osteoarthritis, sleep apnea, and other respiratory problems  Aim for a slow, steady weight loss  Even a small amount of weight loss can lower your risk of health problems  How to lose weight safely:  A safe and healthy way to lose weight is to eat fewer calories and get regular exercise  You can lose up about 1 pound a week by decreasing the number of calories you eat by 500 calories each day  Healthy meal plan for weight management:  A healthy meal plan includes a variety of foods, contains fewer calories, and helps you stay healthy  A healthy meal plan includes the following:  · Eat whole-grain foods more often  A healthy meal plan should contain fiber  Fiber is the part of grains, fruits, and vegetables that is not broken down by your body  Whole-grain foods are healthy and provide extra fiber in your diet  Some examples of whole-grain foods are whole-wheat breads and pastas, oatmeal, brown rice, and bulgur  · Eat a variety of vegetables every day  Include dark, leafy greens such as spinach, kale, saray greens, and mustard greens  Eat yellow and orange vegetables such as carrots, sweet potatoes, and winter squash  · Eat a variety of fruits every day  Choose fresh or canned fruit (canned in its own juice or light syrup) instead of juice  Fruit juice has very little or no fiber  · Eat low-fat dairy foods  Drink fat-free (skim) milk or 1% milk  Eat fat-free yogurt and low-fat cottage cheese  Try low-fat cheeses such as mozzarella and other reduced-fat cheeses  · Choose meat and other protein foods that are low in fat  Choose beans or other legumes such as split peas or lentils  Choose fish, skinless poultry (chicken or turkey), or lean cuts of red meat (beef or pork)  Before you cook meat or poultry, cut off any visible fat  · Use less fat and oil  Try baking foods instead of frying them  Add less fat, such as margarine, sour cream, regular salad dressing and mayonnaise to foods   Eat fewer high-fat foods  Some examples of high-fat foods include french fries, doughnuts, ice cream, and cakes  · Eat fewer sweets  Limit foods and drinks that are high in sugar  This includes candy, cookies, regular soda, and sweetened drinks  Exercise:  Exercise at least 30 minutes per day on most days of the week  Some examples of exercise include walking, biking, dancing, and swimming  You can also fit in more physical activity by taking the stairs instead of the elevator or parking farther away from stores  Ask your healthcare provider about the best exercise plan for you  © Copyright [address] 2018 Information is for End User's use only and may not be sold, redistributed or otherwise used for commercial purposes   All illustrations and images included in CareNotes® are the copyrighted property of A D [employer] M [employer]  or [address] [place] [address]

## 2021-09-28 NOTE — RESULT ENCOUNTER NOTE
Your xray has degenerative changes of spine  You needs to follow up with physical therapy  You will need to accept help with your sister

## 2021-10-11 ENCOUNTER — VBI (OUTPATIENT)
Dept: ADMINISTRATIVE | Facility: OTHER | Age: 61
End: 2021-10-11

## 2021-10-18 DIAGNOSIS — E11.65 UNCONTROLLED TYPE 2 DIABETES MELLITUS WITH HYPERGLYCEMIA (HCC): ICD-10-CM

## 2021-10-20 ENCOUNTER — APPOINTMENT (OUTPATIENT)
Dept: LAB | Facility: HOSPITAL | Age: 61
End: 2021-10-20
Payer: COMMERCIAL

## 2021-10-20 ENCOUNTER — OFFICE VISIT (OUTPATIENT)
Dept: FAMILY MEDICINE CLINIC | Facility: CLINIC | Age: 61
End: 2021-10-20
Payer: COMMERCIAL

## 2021-10-20 VITALS
DIASTOLIC BLOOD PRESSURE: 80 MMHG | RESPIRATION RATE: 16 BRPM | WEIGHT: 192 LBS | SYSTOLIC BLOOD PRESSURE: 146 MMHG | HEART RATE: 88 BPM | OXYGEN SATURATION: 97 % | HEIGHT: 55 IN | TEMPERATURE: 98 F | BODY MASS INDEX: 44.43 KG/M2

## 2021-10-20 DIAGNOSIS — Z23 NEEDS FLU SHOT: ICD-10-CM

## 2021-10-20 DIAGNOSIS — I10 ESSENTIAL HYPERTENSION: Primary | ICD-10-CM

## 2021-10-20 DIAGNOSIS — I10 ESSENTIAL HYPERTENSION: ICD-10-CM

## 2021-10-20 DIAGNOSIS — M79.10 MYALGIA: ICD-10-CM

## 2021-10-20 DIAGNOSIS — M79.621 PAIN IN RIGHT UPPER ARM: ICD-10-CM

## 2021-10-20 DIAGNOSIS — E11.65 UNCONTROLLED TYPE 2 DIABETES MELLITUS WITH HYPERGLYCEMIA (HCC): ICD-10-CM

## 2021-10-20 DIAGNOSIS — M79.621 PAIN IN RIGHT UPPER ARM: Primary | ICD-10-CM

## 2021-10-20 DIAGNOSIS — M54.6 THORACIC SPINE PAIN: ICD-10-CM

## 2021-10-20 LAB
CK SERPL-CCNC: 37 U/L (ref 30–135)
D DIMER PPP FEU-MCNC: 0.82 UG/ML FEU
SL AMB POCT HEMOGLOBIN AIC: 7.1 (ref ?–6.5)

## 2021-10-20 PROCEDURE — 36415 COLL VENOUS BLD VENIPUNCTURE: CPT

## 2021-10-20 PROCEDURE — G0008 ADMIN INFLUENZA VIRUS VAC: HCPCS | Performed by: FAMILY MEDICINE

## 2021-10-20 PROCEDURE — 83036 HEMOGLOBIN GLYCOSYLATED A1C: CPT | Performed by: FAMILY MEDICINE

## 2021-10-20 PROCEDURE — 85379 FIBRIN DEGRADATION QUANT: CPT

## 2021-10-20 PROCEDURE — 90682 RIV4 VACC RECOMBINANT DNA IM: CPT | Performed by: FAMILY MEDICINE

## 2021-10-20 PROCEDURE — 99215 OFFICE O/P EST HI 40 MIN: CPT | Performed by: FAMILY MEDICINE

## 2021-10-20 PROCEDURE — 82550 ASSAY OF CK (CPK): CPT

## 2021-10-20 RX ORDER — LOSARTAN POTASSIUM 100 MG/1
100 TABLET ORAL DAILY
Qty: 90 TABLET | Refills: 3 | Status: SHIPPED | OUTPATIENT
Start: 2021-10-20 | End: 2022-04-13 | Stop reason: ALTCHOICE

## 2021-10-20 RX ORDER — ATORVASTATIN CALCIUM 40 MG/1
40 TABLET, FILM COATED ORAL DAILY
Qty: 90 TABLET | Refills: 3 | Status: SHIPPED | OUTPATIENT
Start: 2021-10-20 | End: 2022-11-19

## 2021-10-20 RX ORDER — AMLODIPINE BESYLATE 10 MG/1
10 TABLET ORAL DAILY
Qty: 90 TABLET | Refills: 3 | Status: SHIPPED | OUTPATIENT
Start: 2021-10-20 | End: 2022-04-14 | Stop reason: ALTCHOICE

## 2021-10-28 ENCOUNTER — TELEPHONE (OUTPATIENT)
Dept: FAMILY MEDICINE CLINIC | Facility: CLINIC | Age: 61
End: 2021-10-28

## 2021-10-29 PROBLEM — M79.621 PAIN IN RIGHT UPPER ARM: Status: ACTIVE | Noted: 2021-10-29

## 2021-11-03 ENCOUNTER — TELEPHONE (OUTPATIENT)
Dept: FAMILY MEDICINE CLINIC | Facility: CLINIC | Age: 61
End: 2021-11-03

## 2021-11-12 ENCOUNTER — VBI (OUTPATIENT)
Dept: ADMINISTRATIVE | Facility: OTHER | Age: 61
End: 2021-11-12

## 2021-12-08 ENCOUNTER — HOSPITAL ENCOUNTER (OUTPATIENT)
Dept: CT IMAGING | Facility: HOSPITAL | Age: 61
Discharge: HOME | End: 2021-12-08
Payer: COMMERCIAL

## 2021-12-08 DIAGNOSIS — M79.621 PAIN IN RIGHT UPPER ARM: ICD-10-CM

## 2021-12-08 PROCEDURE — 73206 CT ANGIO UPR EXTRM W/O&W/DYE: CPT

## 2021-12-08 RX ADMIN — IOHEXOL 100 ML: 350 INJECTION, SOLUTION INTRAVENOUS at 09:20

## 2021-12-09 ENCOUNTER — OFFICE VISIT (OUTPATIENT)
Dept: FAMILY MEDICINE CLINIC | Facility: CLINIC | Age: 61
End: 2021-12-09
Payer: COMMERCIAL

## 2021-12-09 VITALS
TEMPERATURE: 97.9 F | HEART RATE: 94 BPM | WEIGHT: 186 LBS | OXYGEN SATURATION: 97 % | HEIGHT: 55 IN | SYSTOLIC BLOOD PRESSURE: 150 MMHG | RESPIRATION RATE: 16 BRPM | DIASTOLIC BLOOD PRESSURE: 80 MMHG | BODY MASS INDEX: 43.05 KG/M2

## 2021-12-09 DIAGNOSIS — F11.20 CONTINUOUS OPIOID DEPENDENCE (HCC): ICD-10-CM

## 2021-12-09 DIAGNOSIS — M54.6 THORACIC SPINE PAIN: ICD-10-CM

## 2021-12-09 DIAGNOSIS — I82.721: ICD-10-CM

## 2021-12-09 DIAGNOSIS — E11.65 UNCONTROLLED TYPE 2 DIABETES MELLITUS WITH HYPERGLYCEMIA (HCC): ICD-10-CM

## 2021-12-09 DIAGNOSIS — I82.A21 DVT OF AXILLARY VEIN, CHRONIC RIGHT (HCC): Primary | ICD-10-CM

## 2021-12-09 PROCEDURE — 99215 OFFICE O/P EST HI 40 MIN: CPT | Performed by: FAMILY MEDICINE

## 2021-12-09 RX ORDER — SENNOSIDES 8.6 MG
CAPSULE ORAL
COMMUNITY
Start: 2021-11-17 | End: 2021-12-14

## 2021-12-14 DIAGNOSIS — G89.29 CHRONIC LOW BACK PAIN WITH RIGHT-SIDED SCIATICA, UNSPECIFIED BACK PAIN LATERALITY: Primary | ICD-10-CM

## 2021-12-14 DIAGNOSIS — M54.41 CHRONIC LOW BACK PAIN WITH RIGHT-SIDED SCIATICA, UNSPECIFIED BACK PAIN LATERALITY: Primary | ICD-10-CM

## 2021-12-14 RX ORDER — SENNOSIDES 8.6 MG
CAPSULE ORAL
Qty: 60 TABLET | Refills: 4 | Status: SHIPPED | OUTPATIENT
Start: 2021-12-14 | End: 2022-03-25

## 2021-12-20 ENCOUNTER — OFFICE VISIT (OUTPATIENT)
Dept: FAMILY MEDICINE CLINIC | Facility: CLINIC | Age: 61
End: 2021-12-20
Payer: COMMERCIAL

## 2021-12-20 VITALS
BODY MASS INDEX: 43.05 KG/M2 | RESPIRATION RATE: 16 BRPM | HEART RATE: 108 BPM | OXYGEN SATURATION: 98 % | HEIGHT: 55 IN | TEMPERATURE: 98 F | SYSTOLIC BLOOD PRESSURE: 160 MMHG | WEIGHT: 186 LBS | DIASTOLIC BLOOD PRESSURE: 80 MMHG

## 2021-12-20 DIAGNOSIS — M25.511 ACUTE PAIN OF RIGHT SHOULDER: Primary | ICD-10-CM

## 2021-12-20 PROCEDURE — 99214 OFFICE O/P EST MOD 30 MIN: CPT | Performed by: FAMILY MEDICINE

## 2021-12-20 PROCEDURE — 3079F DIAST BP 80-89 MM HG: CPT | Performed by: FAMILY MEDICINE

## 2021-12-20 PROCEDURE — 3008F BODY MASS INDEX DOCD: CPT | Performed by: FAMILY MEDICINE

## 2021-12-20 PROCEDURE — 3077F SYST BP >= 140 MM HG: CPT | Performed by: FAMILY MEDICINE

## 2021-12-20 RX ORDER — NAPROXEN 500 MG/1
500 TABLET ORAL 2 TIMES DAILY WITH MEALS
Qty: 60 TABLET | Refills: 0 | Status: SHIPPED | OUTPATIENT
Start: 2021-12-20 | End: 2022-04-13 | Stop reason: SDUPTHER

## 2021-12-21 ENCOUNTER — HOSPITAL ENCOUNTER (OUTPATIENT)
Dept: RADIOLOGY | Facility: HOSPITAL | Age: 61
Discharge: HOME | End: 2021-12-21
Payer: COMMERCIAL

## 2021-12-21 DIAGNOSIS — M25.511 ACUTE PAIN OF RIGHT SHOULDER: ICD-10-CM

## 2021-12-21 PROCEDURE — 73030 X-RAY EXAM OF SHOULDER: CPT

## 2021-12-28 ENCOUNTER — OFFICE VISIT (OUTPATIENT)
Dept: FAMILY MEDICINE CLINIC | Facility: CLINIC | Age: 61
End: 2021-12-28
Payer: COMMERCIAL

## 2021-12-28 DIAGNOSIS — R60.0 LEG EDEMA, RIGHT: Primary | ICD-10-CM

## 2021-12-28 PROCEDURE — 1036F TOBACCO NON-USER: CPT | Performed by: FAMILY MEDICINE

## 2021-12-28 PROCEDURE — 99214 OFFICE O/P EST MOD 30 MIN: CPT | Performed by: FAMILY MEDICINE

## 2021-12-29 ENCOUNTER — HOSPITAL ENCOUNTER (OUTPATIENT)
Dept: NON INVASIVE DIAGNOSTICS | Facility: HOSPITAL | Age: 61
Discharge: HOME | End: 2021-12-29
Payer: COMMERCIAL

## 2021-12-29 ENCOUNTER — TELEPHONE (OUTPATIENT)
Dept: FAMILY MEDICINE CLINIC | Facility: CLINIC | Age: 61
End: 2021-12-29

## 2021-12-29 DIAGNOSIS — M77.8 RIGHT SHOULDER TENDONITIS: Primary | ICD-10-CM

## 2021-12-29 DIAGNOSIS — R60.0 LEG EDEMA, RIGHT: ICD-10-CM

## 2021-12-29 PROCEDURE — 93970 EXTREMITY STUDY: CPT

## 2021-12-29 PROCEDURE — 93970 EXTREMITY STUDY: CPT | Performed by: SURGERY

## 2022-03-25 DIAGNOSIS — I10 HYPERTENSION, UNSPECIFIED TYPE: ICD-10-CM

## 2022-03-25 RX ORDER — CARVEDILOL 12.5 MG/1
TABLET ORAL
Qty: 180 TABLET | Refills: 1 | Status: SHIPPED | OUTPATIENT
Start: 2022-03-25 | End: 2022-04-14 | Stop reason: ALTCHOICE

## 2022-04-13 ENCOUNTER — OFFICE VISIT (OUTPATIENT)
Dept: FAMILY MEDICINE CLINIC | Facility: CLINIC | Age: 62
End: 2022-04-13
Payer: MEDICARE

## 2022-04-13 ENCOUNTER — APPOINTMENT (OUTPATIENT)
Dept: LAB | Facility: HOSPITAL | Age: 62
End: 2022-04-13
Payer: MEDICARE

## 2022-04-13 VITALS
DIASTOLIC BLOOD PRESSURE: 80 MMHG | WEIGHT: 195 LBS | OXYGEN SATURATION: 98 % | SYSTOLIC BLOOD PRESSURE: 138 MMHG | HEART RATE: 92 BPM | HEIGHT: 55 IN | TEMPERATURE: 98 F | BODY MASS INDEX: 45.13 KG/M2 | RESPIRATION RATE: 16 BRPM

## 2022-04-13 DIAGNOSIS — Z12.31 ENCOUNTER FOR SCREENING MAMMOGRAM FOR MALIGNANT NEOPLASM OF BREAST: ICD-10-CM

## 2022-04-13 DIAGNOSIS — E66.01 MORBID OBESITY WITH BMI OF 50.0-59.9, ADULT (HCC): Primary | ICD-10-CM

## 2022-04-13 DIAGNOSIS — F11.20 CONTINUOUS OPIOID DEPENDENCE (HCC): ICD-10-CM

## 2022-04-13 DIAGNOSIS — E11.65 UNCONTROLLED TYPE 2 DIABETES MELLITUS WITH HYPERGLYCEMIA (HCC): ICD-10-CM

## 2022-04-13 DIAGNOSIS — I82.A21 DVT OF AXILLARY VEIN, CHRONIC RIGHT (HCC): ICD-10-CM

## 2022-04-13 DIAGNOSIS — I10 HYPERTENSION, UNSPECIFIED TYPE: ICD-10-CM

## 2022-04-13 DIAGNOSIS — M25.511 ACUTE PAIN OF RIGHT SHOULDER: ICD-10-CM

## 2022-04-13 DIAGNOSIS — F32.9 REACTIVE DEPRESSION: ICD-10-CM

## 2022-04-13 LAB
ALBUMIN SERPL BCP-MCNC: 4.4 G/DL (ref 3–5.2)
ALP SERPL-CCNC: 95 U/L (ref 43–122)
ALT SERPL W P-5'-P-CCNC: 16 U/L
ANION GAP SERPL CALCULATED.3IONS-SCNC: 9 MMOL/L (ref 5–14)
AST SERPL W P-5'-P-CCNC: 21 U/L (ref 14–36)
BILIRUB SERPL-MCNC: 0.64 MG/DL
BUN SERPL-MCNC: 12 MG/DL (ref 5–25)
CALCIUM SERPL-MCNC: 9.1 MG/DL (ref 8.4–10.2)
CHLORIDE SERPL-SCNC: 103 MMOL/L (ref 97–108)
CO2 SERPL-SCNC: 27 MMOL/L (ref 22–30)
CREAT SERPL-MCNC: 0.59 MG/DL (ref 0.6–1.2)
CREAT UR-MCNC: 53.5 MG/DL
GFR SERPL CREATININE-BSD FRML MDRD: 98 ML/MIN/1.73SQ M
GLUCOSE P FAST SERPL-MCNC: 133 MG/DL (ref 70–99)
MICROALBUMIN UR-MCNC: 226 MG/L (ref 0–20)
MICROALBUMIN/CREAT 24H UR: 422 MG/G CREATININE (ref 0–30)
POTASSIUM SERPL-SCNC: 4.1 MMOL/L (ref 3.6–5)
PROT SERPL-MCNC: 7.9 G/DL (ref 5.9–8.4)
SL AMB POCT HEMOGLOBIN AIC: 7.1 (ref ?–6.5)
SODIUM SERPL-SCNC: 139 MMOL/L (ref 137–147)

## 2022-04-13 PROCEDURE — 82570 ASSAY OF URINE CREATININE: CPT | Performed by: FAMILY MEDICINE

## 2022-04-13 PROCEDURE — 82043 UR ALBUMIN QUANTITATIVE: CPT | Performed by: FAMILY MEDICINE

## 2022-04-13 PROCEDURE — 83036 HEMOGLOBIN GLYCOSYLATED A1C: CPT | Performed by: FAMILY MEDICINE

## 2022-04-13 PROCEDURE — 80053 COMPREHEN METABOLIC PANEL: CPT

## 2022-04-13 PROCEDURE — 99214 OFFICE O/P EST MOD 30 MIN: CPT | Performed by: FAMILY MEDICINE

## 2022-04-13 PROCEDURE — 36415 COLL VENOUS BLD VENIPUNCTURE: CPT

## 2022-04-13 RX ORDER — LOSARTAN POTASSIUM 50 MG/1
50 TABLET ORAL 2 TIMES DAILY
Qty: 180 TABLET | Refills: 3 | Status: SHIPPED | OUTPATIENT
Start: 2022-04-13

## 2022-04-13 RX ORDER — TRAZODONE HYDROCHLORIDE 50 MG/1
50 TABLET ORAL
Qty: 30 TABLET | Refills: 5 | Status: SHIPPED | OUTPATIENT
Start: 2022-04-13

## 2022-04-13 RX ORDER — NAPROXEN 500 MG/1
500 TABLET ORAL 2 TIMES DAILY WITH MEALS
Qty: 60 TABLET | Refills: 5 | Status: SHIPPED | OUTPATIENT
Start: 2022-04-13 | End: 2022-04-14 | Stop reason: ALTCHOICE

## 2022-04-13 RX ORDER — SENNOSIDES 8.6 MG
CAPSULE ORAL
COMMUNITY
Start: 2022-03-25 | End: 2022-04-13 | Stop reason: ALTCHOICE

## 2022-04-13 NOTE — ASSESSMENT & PLAN NOTE
We discussed about the initial approach to the obese patient who desires to lose weight  Patient should diet and exercise, as recommended by the NIH Expert Panel in 1998   A combination of a reduced-calorie diet and exercise is more efficacious than either alone  Patient declines referral to weight management

## 2022-04-13 NOTE — PROGRESS NOTES
Assessment/Plan:    Continuous opioid dependence ([place] [address] )  Discontinues opioid treatment    DVT of axillary vein, chronic right ([place] [address] )  Treatment ended  Uncontrolled type 2 diabetes mellitus with hyperglycemia (HCC)    Lab Results   Component Value Date    HGBA1C 7 1 (A) 04/13/2022       Morbid obesity with BMI of 50 0-59 9, adult ([place] [address] )  We discussed about the initial approach to the obese patient who desires to lose weight  Patient should diet and exercise, as recommended by the NIH Expert Panel in 1998   A combination of a reduced-calorie diet and exercise is more efficacious than either alone  Patient declines referral to weight management  Diagnoses and all orders for this visit:    Morbid obesity with BMI of 50 0-59 9, adult ([place] [address] )    Encounter for screening mammogram for malignant neoplasm of breast  -     Mammo screening bilateral w 3d & cad; Future    Uncontrolled type 2 diabetes mellitus with hyperglycemia ([place] [address] )  -     Comprehensive metabolic panel; Future  -     Microalbumin / creatinine urine ratio  -     POCT hemoglobin A1c    DVT of axillary vein, chronic right (HCC)    Continuous opioid dependence (HCC)    Hypertension, unspecified type  -     losartan (Cozaar) 50 mg tablet; Take 1 tablet (50 mg total) by mouth 2 (two) times a day    Acute pain of right shoulder  -     naproxen (Naprosyn) 500 mg tablet; Take 1 tablet (500 mg total) by mouth 2 (two) times a day with meals    Reactive depression  -     traZODone (DESYREL) 50 mg tablet; Take 1 tablet (50 mg total) by mouth daily at bedtime    Other orders  -     Discontinue: acetaminophen (TYLENOL) 650 mg CR tablet          Subjective:      Patient ID: Gilmar Barreto is a 57 y o  female  Patient is here to follow Diabetes and HTN, patient states good compliance with treatment  Denies chest pain, shortness of breath, angina, urinary problems   No exercise but follows low salt and low carbohydrates diet       acetaminophen  amLODIPine  atorvastatin  carvedilol  FREESTYLE LITE Strp  glucose monitoring kit  losartan  metFORMIN  naproxen             The following portions of the patient's history were reviewed and updated as appropriate: allergies, current medications, past family history, past medical history, past social history, past surgical history and problem list     Review of Systems   Constitutional: Negative for diaphoresis, fatigue, fever and unexpected weight change  Respiratory: Negative for cough, chest tightness, shortness of breath and wheezing  Cardiovascular: Negative for chest pain, palpitations and leg swelling  Gastrointestinal: Negative for abdominal pain, blood in stool and constipation  Musculoskeletal: Positive for arthralgias and back pain  Neurological: Negative for dizziness, syncope, light-headedness and headaches  Hematological: Does not bruise/bleed easily  Psychiatric/Behavioral: Positive for decreased concentration and sleep disturbance (due to family genetic matters/)  Negative for behavioral problems and self-injury  The patient is not nervous/anxious  Objective:      /80 (BP Location: Left arm, Patient Position: Sitting, Cuff Size: Standard)   Pulse 92   Temp 98 °F (36 7 °C) (Temporal)   Resp 16   Ht 4' 4" (1 321 m)   Wt 88 5 kg (195 lb)   SpO2 98%   Breastfeeding No   BMI 50 70 kg/m²          Physical Exam  Constitutional:       Appearance: She is obese  Cardiovascular:      Rate and Rhythm: Normal rate and regular rhythm  Pulmonary:      Effort: Pulmonary effort is normal       Breath sounds: Normal breath sounds  Musculoskeletal:         General: Normal range of motion  Cervical back: Neck supple  Neurological:      General: No focal deficit present  Mental Status: She is alert and oriented to person, place, and time  Psychiatric:         Behavior: Behavior normal          BMI Counseling:  Body mass index is 50 7 kg/m²  The BMI is above normal  Nutrition recommendations include reducing fast food intake, consuming healthier snacks and decreasing soda and/or juice intake  Exercise recommendations include exercising 3-5 times per week

## 2022-04-14 DIAGNOSIS — E11.29 TYPE 2 DIABETES MELLITUS WITH MICROALBUMINURIA, WITHOUT LONG-TERM CURRENT USE OF INSULIN (HCC): Primary | ICD-10-CM

## 2022-04-14 DIAGNOSIS — R80.9 TYPE 2 DIABETES MELLITUS WITH MICROALBUMINURIA, WITHOUT LONG-TERM CURRENT USE OF INSULIN (HCC): Primary | ICD-10-CM

## 2022-04-14 DIAGNOSIS — I10 HYPERTENSION, UNSPECIFIED TYPE: ICD-10-CM

## 2022-04-14 RX ORDER — AMLODIPINE BESYLATE 5 MG/1
5 TABLET ORAL DAILY
Qty: 90 TABLET | Refills: 3 | Status: SHIPPED | OUTPATIENT
Start: 2022-04-14

## 2022-04-14 RX ORDER — DULAGLUTIDE 0.75 MG/.5ML
0.75 INJECTION, SOLUTION SUBCUTANEOUS WEEKLY
Qty: 2 ML | Refills: 1 | Status: SHIPPED | OUTPATIENT
Start: 2022-04-14 | End: 2022-06-03 | Stop reason: ALTCHOICE

## 2022-04-15 ENCOUNTER — TELEPHONE (OUTPATIENT)
Dept: NEPHROLOGY | Facility: CLINIC | Age: 62
End: 2022-04-15

## 2022-04-15 ENCOUNTER — TELEPHONE (OUTPATIENT)
Dept: FAMILY MEDICINE CLINIC | Facility: CLINIC | Age: 62
End: 2022-04-15

## 2022-04-15 NOTE — TELEPHONE ENCOUNTER
New Patient Intake Form   Patient Details   Shirin Rahman     1960     [id number]     Appointment Information   Who is calling to schedule? If not patient, what is callers name? Patient / Josue Melo   Referring Provider DR Reyes Grams   Referring Provider Number [phone]   Reason for Appt (Diagnosis) E11 29, R80 9 (ICD-10-CM) - Type 2 diabetes mellitus with microalbuminuria, without long-term current use of insulin ([facility] [address] )   Is patient aware of why they are being referred? Does Patient have labs done at [place] [address]? If not, where do they go? yes    Has patient had labs / urine work done? List date of most recent lab / urine work yes    Has patient had a BMP & CBC done in the past 2 years? If so, list the date yes    Has patient been hospitalized recently? If yes, list name and location of hospital they were in no    Has patient been seen by a Nephrologist before? If yes, list name, location and phone number  no   Has patient been see by another Specialty before (ex  Neurology, urology, cardiology)? If yes, please list name, and specialty No Specialty just PCP  Has the patient had imaging done? If so, list the most recent date and type of imaging yes    Does the patient has a stone analysis report if history of kidney stones? no   Appointment Details   Is there a referral on file? yes    Appointment Date 6/3/2022 DR MCKENZIE   [facility]    Miscellaneous   ALL RECORDS IN Epic

## 2022-04-15 NOTE — TELEPHONE ENCOUNTER
Patient called stating that the specialist that she was referred to does not have appts til end of June and will like to know can she wait that long?  Please advise

## 2022-05-31 DIAGNOSIS — E11.29 TYPE 2 DIABETES MELLITUS WITH MICROALBUMINURIA, WITHOUT LONG-TERM CURRENT USE OF INSULIN (HCC): ICD-10-CM

## 2022-05-31 DIAGNOSIS — R80.9 TYPE 2 DIABETES MELLITUS WITH MICROALBUMINURIA, WITHOUT LONG-TERM CURRENT USE OF INSULIN (HCC): ICD-10-CM

## 2022-06-03 ENCOUNTER — CONSULT (OUTPATIENT)
Dept: NEPHROLOGY | Facility: CLINIC | Age: 62
End: 2022-06-03
Payer: MEDICARE

## 2022-06-03 VITALS
BODY MASS INDEX: 42.12 KG/M2 | HEIGHT: 55 IN | DIASTOLIC BLOOD PRESSURE: 86 MMHG | WEIGHT: 182 LBS | HEART RATE: 72 BPM | SYSTOLIC BLOOD PRESSURE: 140 MMHG

## 2022-06-03 DIAGNOSIS — K31.84 DIABETIC GASTROPARESIS ASSOCIATED WITH TYPE 2 DIABETES MELLITUS (HCC): ICD-10-CM

## 2022-06-03 DIAGNOSIS — I10 ESSENTIAL HYPERTENSION: ICD-10-CM

## 2022-06-03 DIAGNOSIS — N18.1 CKD (CHRONIC KIDNEY DISEASE) STAGE 1, GFR 90 ML/MIN OR GREATER: Primary | ICD-10-CM

## 2022-06-03 DIAGNOSIS — E66.01 MORBID OBESITY WITH BMI OF 50.0-59.9, ADULT (HCC): ICD-10-CM

## 2022-06-03 DIAGNOSIS — R80.1 PERSISTENT PROTEINURIA: ICD-10-CM

## 2022-06-03 DIAGNOSIS — E11.65 UNCONTROLLED TYPE 2 DIABETES MELLITUS WITH HYPERGLYCEMIA (HCC): ICD-10-CM

## 2022-06-03 DIAGNOSIS — R80.9 TYPE 2 DIABETES MELLITUS WITH MICROALBUMINURIA, WITHOUT LONG-TERM CURRENT USE OF INSULIN (HCC): ICD-10-CM

## 2022-06-03 DIAGNOSIS — E11.43 DIABETIC GASTROPARESIS ASSOCIATED WITH TYPE 2 DIABETES MELLITUS (HCC): ICD-10-CM

## 2022-06-03 DIAGNOSIS — E11.29 TYPE 2 DIABETES MELLITUS WITH MICROALBUMINURIA, WITHOUT LONG-TERM CURRENT USE OF INSULIN (HCC): ICD-10-CM

## 2022-06-03 DIAGNOSIS — E78.5 HYPERLIPIDEMIA, UNSPECIFIED HYPERLIPIDEMIA TYPE: ICD-10-CM

## 2022-06-03 PROCEDURE — 99204 OFFICE O/P NEW MOD 45 MIN: CPT | Performed by: INTERNAL MEDICINE

## 2022-06-03 RX ORDER — DULAGLUTIDE 1.5 MG/.5ML
1.5 INJECTION, SOLUTION SUBCUTANEOUS WEEKLY
Qty: 2 ML | Refills: 5 | Status: SHIPPED | OUTPATIENT
Start: 2022-06-03 | End: 2022-06-25

## 2022-06-03 RX ORDER — OMEPRAZOLE 10 MG/1
10 CAPSULE, DELAYED RELEASE ORAL AS NEEDED
COMMUNITY

## 2022-06-03 RX ORDER — DULAGLUTIDE 0.75 MG/.5ML
INJECTION, SOLUTION SUBCUTANEOUS
Qty: 2 ML | Refills: 0 | OUTPATIENT
Start: 2022-06-03

## 2022-06-03 NOTE — PROGRESS NOTES
Nephrology Initial Consultation  Дмитрий Contreras 57 y o  female MRN: [id number]            REASON FOR CONSULTATION:  Дмитрий Contreras is a 57 y  o female who was referred by Samantha Quick MD for evaluation of Consult (Microalbuminuria  /)    ASSESSMENT / PLAN:   57 y o   female with pmh of multiple co-morbidities including morbid obesity, hypertension (x 15yrs), diabetes (x15yrs), arthritis and hyperlipidemia presents to the office for evaluation and management of proteinuria  CKD stage 1A3:  - After review of records in [facility] as well as Care everywhere patient has a baseline creatinine of 0 5-0 7 mg/dL dating as far back as 2018  Most recent labs show a Creatinine of 0 59 mg/dL on 04/13/2022  Renal function remains stable  Check blood work after the visit  - likely has underlying CKD secondary to obesity related hyper filtration plus diabetic kidney disease plus hypertensive nephrosclerosis plus age-related nephron loss plus NSAID nephropathy  Will still rule out paraproteinemia check SPEP UPEP free light chain ratio  - abdominal ultrasound from 09/26/2018 showing bilateral kidneys approximately 10 cm  No hydronephrosis  At this time would hold off on repeat renal ultrasound as renal parameters continue remains stable  - Acid base and lytes stable  - Clinically the patient appears to be euvolemic to minimally hypovolemic encourage patient to increase fluid intake  - Recommend to avoid use of NSAIDs, nephrotoxins  Caution advised with regards to exposure to IV contrast dye    - Discussed with the patient in depth her renal status, including the possible etiologies for CKD  - Advised the patient that when her GFR is close to 20mL/min then will start discussing about RRT(renal replacement therapy) options such as renal transplant, peritoneal dialysis and hemodialysis  - Informed the patient about the various options for Renal Replacement therapy    - Discussed with the patient how we need to work together to delay the progression of CKD with optimal BP control based on their age and co-morbidities, optimal BS control with HbA1c of <7% and trying to reduce proteinuria by the use of anti-proteinuric agents  Hypertension:  - Patient is on Norvasc 5 mg p o  q day, losartan 50 mg p o  b i d    - discussed with patient appropriate techniques of checking blood pressure to call with blood pressure updates if any issues  - Goal BP of <  140/90 based on age and comorbidities  - Instructed to follow low sodium (2gm)diet   - Advised to hold ACEI/ARBs if patient suffers from dehydration due to gastrointestinal losses due to risk of HUNG secondary to failure to autoregulate  Hemoglobin:  - Goal Hb of 10-12 g/dL  - Most recent labs suggestive of 12 6 grams/deciliter  - no role for IV iron at this time    CKD-MBD(Mineral Bone Disease): - Based on patients CKD stage following is the goal of therapy  - Maintain calcium phosphorus product of < 55   - check vitamin-D level after the visit and prior to next visit    Proteinuria:  - proteinuria most likely secondary to diabetic kidney disease plus obesity related hyper filtration     - advised patient to avoid NSAIDs as could result in proteinuria as well as renal injury  - Will still rule out paraproteinemia check SPEP UPEP free light chain ratio  - most recent micro to creatinine ratio 422 mg as of 04/13/2022   - check protein creatinine ratio, UA to see if patient has significant hematuria or any signs concerning to suggest underlying GN  Depending on initial workup will see if she needs further thyroid GN workup  Did discuss potential for renal biopsy however low likelihood at this time  Will see what initial workup shows    - currently on therapy for proteinuria with Lipitor, losartan    Lipids:  - on Lipitor  - goal LDL less than 70  - Management as per PCP    DM:  - management as per Primary team  - on Trulicity, metformin  - advised patient when and if renal parameters continue to deteriorate she may need to be taken off the metformin  - If proteinuria rises and a1c is elevated may need to consider addition of farxiga to help delay ckd progression further   - advised of tight glycemic control in order to delay CKD progression  - records reveal A1c consistently greater than 6 5% since 2018,   - most recent A1c from 04/13/2022 at 7 1%  - no evidence of diabetic retinopathy as of 08/19/2020 diabetic exam     Nutrition/morbid obesity:  - Encouraged patient to follow a renal diet comprising of moderate potassium, low phosphorus and protein restriction to 0 8gm/kg  Advised of dietary habits and weight loss  - Will check serum albumin with next blood work  Followup:  - Patient is to follow-up in 6 months, with lab work to be performed after the visit and then again in a few days prior to the next visit  Advised patient to call me in 10 days to review the results if they do not hear back from me, as I may have not received the results  Riaz Rosales MD, [facility], 6/3/2022, 1:26 PM             HISTORY OF PRESENT ILLNESS: 57 y o  female with multiple comorbidities including morbid obesity, hypertension (x 15yrs), diabetes (x15yrs), arthritis and hyperlipidemia presents to the office for evaluation and management of proteinuria  Review of records shows patient has a baseline creatinine of 0 5-0 7 mg/dL dating as far back as 2018 most recent labs from 04/13/2022 the creatinine 0 59 mg/dL  Review of records shows A1c consistently greater than 6 5 since 2018  Most recent A1c from 04/13/2022 at 7 1%  Records revealed progressively increased proteinuria most recent microalbumin creatinine ratio 422 mg as of April 2022  Patient had proteinuria dating febrile arc as May 2019  Was on naproxen for 3 weeks and stopped it and prior to that ibuprofen for many yrs  Last NSAID was about 1 5mth ago has never seen a nephrologist before  No h/o aziza needing dialysis or kidney stones  Happy and thankful for all the care and information she has received today  No issues with edema  Home sbp is about 120's with occasional 130's  Review of Systems   Constitutional: Negative for appetite change, chills, fatigue and fever  HENT: Positive for postnasal drip and rhinorrhea  Negative for congestion and sore throat  Respiratory: Negative for cough, shortness of breath and wheezing  Cardiovascular: Negative for leg swelling  Gastrointestinal: Positive for nausea  Negative for abdominal pain, constipation, diarrhea and vomiting  Genitourinary: Negative for difficulty urinating, dysuria and hematuria  Musculoskeletal: Negative for back pain  Skin: Negative for wound  Neurological: Negative for dizziness, light-headedness and headaches  Psychiatric/Behavioral: Negative for agitation and confusion  All other systems reviewed and are negative        PAST MEDICAL HISTORY:  Past Medical History:   Diagnosis Date    Back problem     herniated discs    Chronic pain disorder     back    Diabetes mellitus ([facility] [address] )     Diverticulitis     Diverticulosis     Hyperglycemia     Hyperlipidemia     Hypertension     Impacted cerumen     Obesity     Palpitations        PROBLEM LIST    Patient Active Problem List   Diagnosis    Essential hypertension    Encounter for screening mammogram for malignant neoplasm of breast    Hyperlipidemia    LFT elevation    Chronic low back pain with right-sided sciatica    Uncontrolled type 2 diabetes mellitus with hyperglycemia ([facility] [address] )    Diabetic gastroparesis associated with type 2 diabetes mellitus (HCC)    Acute non-recurrent frontal sinusitis    Needs flu shot    Anxiety    Left foot pain    Morbid obesity with BMI of 50 0-59 9, adult (HCC)    Localized edema    Thoracic spine pain    Acute right-sided low back pain without sciatica    Pain in right upper arm    CKD (chronic kidney disease) stage 1, GFR 90 ml/min or greater    Persistent proteinuria       PAST SURGICAL HISTORY:  Past Surgical History:   Procedure Laterality Date    CARPAL TUNNEL RELEASE Right 1983    DIAGNOSTIC LAPAROSCOPY      ESOPHAGOGASTRODUODENOSCOPY  10/2018    gastroduodenitis    PERIPHERAL ANGIOGRAM      MO COLONOSCOPY FLX DX W/COLLJ SPEC WHEN PFRMD N/A 9/12/2018    dr chwodhury, diverticulosis    MO ESOPHAGOGASTRODUODENOSCOPY TRANSORAL DIAGNOSTIC N/A 10/10/2018    Procedure: EGD;  Surgeon: Lynette Melissa DO;  Location: [facility]; Service: General       SOCIAL HISTORY :   reports that she has never smoked  She has never used smokeless tobacco  She reports that she does not drink alcohol and does not use drugs  FAMILY HISTORY:  Family History   Problem Relation Age of Onset    Kidney disease Mother     Hypertension Mother     Coronary artery disease Father         premature    Diabetes Sister     Coronary artery disease Sister     Coronary artery disease Maternal Grandfather     Diabetes Paternal Grandfather     Coronary artery disease Paternal Grandfather     Breast cancer Paternal Aunt        ALLERGIES:  Allergies   Allergen Reactions    Vicodin [Hydrocodone-Acetaminophen] Angioedema    Percocet [Oxycodone-Acetaminophen] GI Intolerance    Aspirin GI Intolerance    Diclofenac Sodium Rash           PHYSICAL EXAM:  Vitals:    06/03/22 1247   BP: 140/86   Pulse: 72   Weight: 82 6 kg (182 lb)   Height: 4' 4" (1 321 m)     Body mass index is 47 32 kg/m²  Physical Exam  Vitals reviewed  Constitutional:       General: She is not in acute distress  Appearance: Normal appearance  She is obese  She is not ill-appearing, toxic-appearing or diaphoretic  HENT:      Head: Normocephalic and atraumatic  Mouth/Throat:      Mouth: Mucous membranes are dry  Pharynx: Oropharynx is clear  No oropharyngeal exudate  Eyes:      General: No scleral icterus  Conjunctiva/sclera: Conjunctivae normal    Cardiovascular:      Rate and Rhythm: Normal rate  Heart sounds: Normal heart sounds  No murmur heard  No friction rub  Pulmonary:      Effort: Pulmonary effort is normal  No respiratory distress  Breath sounds: Normal breath sounds  No stridor  No wheezing  Abdominal:      General: There is no distension  Palpations: Abdomen is soft  There is no mass  Tenderness: There is no abdominal tenderness  There is no right CVA tenderness or left CVA tenderness  Musculoskeletal:         General: No swelling  Cervical back: Normal range of motion and neck supple  No rigidity  Skin:     General: Skin is warm  Coloration: Skin is not jaundiced  Neurological:      General: No focal deficit present  Mental Status: She is alert and oriented to person, place, and time     Psychiatric:         Mood and Affect: Mood normal          Behavior: Behavior normal            LABORATORY DATA:     Results from last 6 Months   Lab Units 04/13/22  0947   POTASSIUM mmol/L 4 1   CHLORIDE mmol/L 103   CO2 mmol/L 27   BUN mg/dL 12   CREATININE mg/dL 0 59*   CALCIUM mg/dL 9 1        rest all reviewed    RADIOLOGY:  No orders to display     Rest all reviewed        MEDICATIONS:    Current Outpatient Medications:     amLODIPine (NORVASC) 5 mg tablet, Take 1 tablet (5 mg total) by mouth daily, Disp: 90 tablet, Rfl: 3    atorvastatin (LIPITOR) 40 mg tablet, Take 1 tablet (40 mg total) by mouth daily, Disp: 90 tablet, Rfl: 3    Dulaglutide (Trulicity) 1 5 BM/0 5UX SOPN, Inject 0 5 mL (1 5 mg total) under the skin once a week for 4 doses, Disp: 2 mL, Rfl: 5    glucose blood (FREESTYLE LITE) test strip, To check blood sugar once a day, Disp: 100 each, Rfl: 0    glucose monitoring kit (FREESTYLE) monitoring kit, Check blood sugar twice a day, Disp: 1 each, Rfl: 0    losartan (Cozaar) 50 mg tablet, Take 1 tablet (50 mg total) by mouth 2 (two) times a day, Disp: 180 tablet, Rfl: 3    metFORMIN (GLUCOPHAGE) 500 mg tablet, Take 1 tablet (500 mg total) by mouth 2 (two) times a day with meals, Disp: 180 tablet, Rfl: 2    omeprazole (PriLOSEC) 10 mg delayed release capsule, Take 10 mg by mouth as needed, Disp: , Rfl:     traZODone (DESYREL) 50 mg tablet, Take 1 tablet (50 mg total) by mouth daily at bedtime, Disp: 30 tablet, Rfl: 5        Portions of the record may have been created with voice recognition software  Occasional wrong word or "sound a like" substitutions may have occurred due to the inherent limitations of voice recognition software  Read the chart carefully and recognize, using context, where substitutions have occurred  If you have any questions, please contact the dictating provider

## 2022-06-03 NOTE — PATIENT INSTRUCTIONS
- get blood work after the visit    - Please call me in 10 days after having your blood work done to review the results if you do not hear back from me or my office, as I may have not received the results  - please remember to perform blood work prior to the next visit  - Please call if the blood pressure top number is greater than 150 or less than 110 consistently  - Please call if you are gaining more than 2lbs in 2 days for adjustment of water pills   ~ Please AVOID the following pain medications  LIST OF NSAIDS (NONSTEROIDAL ANTI-INFLAMMATORY DRUGS) AND BURGOS-2 INHIBITORS    DIFLUNISAL (DOLOBID)  IBUPROFEN (MOTRIN, ADVIL)  FLURBIPROFEN (ANSAID)  KETOPROFEN (ORUDIS, ORUVAIL)  FENOPROFEN (NALFON)  NABUMETONE (RELAFEN)  PIROXICAM (FELDENE)  NAPROXEN (ALEVE, NAPROSYN, NAPRELAN, ANAPROX)  DICLOFENAC (VOLTAREN, CATAFLAM)  INDOMETHACIN (INDOCIN)  SULINDAC (CLINORIL)  TOLMETIN (TOLETIN)  ETODOLAC (LODINE)  MELOXICAM (MOBIC)  KETOROLAC (TORADOL)  OXAPROZIN (DAYPRO)  CELECOXIB (CELEBREX)    Things to do to reduce your blood pressure include working with all your physician to do the following:  ~ stop smoking if you smoke  ~ increase cardiovascular exercise like walking and swimming    ~ modify your diet to decrease fat and salt intake  ~ reduce your weight if you are overweight or obese   ~ increase the consumption of fruits, vegetables and whole grains  ~ decrease alcohol consumption if you consume alcohol    ~ try to minimize stress in your life with lifestyle modifications  ~ be compliant with your anti-hypertensive medications  ~ adjust your medications to help improve your vascular stiffness and decrease risks for heart attacks and strokes  Exercise to Lose Weight     Exercise and a healthy diet may help you lose weight  Your doctor may suggest specific exercises  EXERCISE IDEAS AND TIPS     Choose low-cost things you enjoy doing, such as walking, bicycling, or exercising to workout videos     Take stairs instead of the elevator  Walk during your lunch break  Park your car further away from work or school  Go to a gym or an exercise class  Start with 5 to 10 minutes of exercise each day  Build up to 30 minutes of exercise 4 to 6 days a week  Wear shoes with good support and comfortable clothes  Stretch before and after working out  Work out until you breathe harder and your heart beats faster  Drink extra water when you exercise  Do not do so much that you hurt yourself, feel dizzy, or get very short of breath  Exercises that burn about 150 calories:   Running 1 ½ miles in 15 minutes  Playing volleyball for 45 to 60 minutes  Washing and waxing a car for 45 to 60 minutes  Playing touch football for 45 minutes  Walking 1 ¾ miles in 35 minutes  Pushing a stroller 1 ½ miles in 30 minutes  Playing basketball for 30 minutes  Raking leaves for 30 minutes  Bicycling 5 miles in 30 minutes  Walking 2 miles in 30 minutes  Dancing for 30 minutes  Shoveling snow for 15 minutes  Swimming laps for 20 minutes  Walking up stairs for 15 minutes  Bicycling 4 miles in 15 minutes  Gardening for 30 to 45 minutes  Jumping rope for 15 minutes  Washing windows or floors for 45 to 60 minutes

## 2022-06-04 ENCOUNTER — APPOINTMENT (OUTPATIENT)
Dept: LAB | Facility: HOSPITAL | Age: 62
End: 2022-06-04
Attending: INTERNAL MEDICINE
Payer: MEDICARE

## 2022-06-04 DIAGNOSIS — I10 ESSENTIAL HYPERTENSION: ICD-10-CM

## 2022-06-04 DIAGNOSIS — R80.1 PERSISTENT PROTEINURIA: ICD-10-CM

## 2022-06-04 DIAGNOSIS — E11.65 UNCONTROLLED TYPE 2 DIABETES MELLITUS WITH HYPERGLYCEMIA (HCC): ICD-10-CM

## 2022-06-04 DIAGNOSIS — E11.29 TYPE 2 DIABETES MELLITUS WITH MICROALBUMINURIA, WITHOUT LONG-TERM CURRENT USE OF INSULIN (HCC): ICD-10-CM

## 2022-06-04 DIAGNOSIS — R80.9 TYPE 2 DIABETES MELLITUS WITH MICROALBUMINURIA, WITHOUT LONG-TERM CURRENT USE OF INSULIN (HCC): ICD-10-CM

## 2022-06-04 DIAGNOSIS — E66.01 MORBID OBESITY WITH BMI OF 50.0-59.9, ADULT (HCC): ICD-10-CM

## 2022-06-04 DIAGNOSIS — N18.1 CKD (CHRONIC KIDNEY DISEASE) STAGE 1, GFR 90 ML/MIN OR GREATER: ICD-10-CM

## 2022-06-04 DIAGNOSIS — E78.5 HYPERLIPIDEMIA, UNSPECIFIED HYPERLIPIDEMIA TYPE: ICD-10-CM

## 2022-06-04 LAB
25(OH)D3 SERPL-MCNC: 11.7 NG/ML (ref 30–100)
ALBUMIN SERPL BCP-MCNC: 4.2 G/DL (ref 3–5.2)
ANION GAP SERPL CALCULATED.3IONS-SCNC: 11 MMOL/L (ref 5–14)
BACTERIA UR QL AUTO: ABNORMAL /HPF
BILIRUB UR QL STRIP: NEGATIVE
BUN SERPL-MCNC: 10 MG/DL (ref 5–25)
CALCIUM SERPL-MCNC: 9.2 MG/DL (ref 8.4–10.2)
CHLORIDE SERPL-SCNC: 106 MMOL/L (ref 97–108)
CLARITY UR: CLEAR
CO2 SERPL-SCNC: 23 MMOL/L (ref 22–30)
COLOR UR: YELLOW
CREAT SERPL-MCNC: 0.7 MG/DL (ref 0.6–1.2)
GFR SERPL CREATININE-BSD FRML MDRD: 93 ML/MIN/1.73SQ M
GLUCOSE P FAST SERPL-MCNC: 118 MG/DL (ref 70–99)
GLUCOSE UR STRIP-MCNC: NEGATIVE MG/DL
HGB UR QL STRIP.AUTO: 10
KETONES UR STRIP-MCNC: NEGATIVE MG/DL
LEUKOCYTE ESTERASE UR QL STRIP: 500
MUCOUS THREADS UR QL AUTO: ABNORMAL
NITRITE UR QL STRIP: NEGATIVE
NON-SQ EPI CELLS URNS QL MICRO: ABNORMAL /HPF
PH UR STRIP.AUTO: 5 [PH]
PHOSPHATE SERPL-MCNC: 4.4 MG/DL (ref 2.5–4.8)
POTASSIUM SERPL-SCNC: 3.6 MMOL/L (ref 3.6–5)
PROT UR STRIP-MCNC: ABNORMAL MG/DL
RBC #/AREA URNS AUTO: ABNORMAL /HPF
SODIUM SERPL-SCNC: 140 MMOL/L (ref 137–147)
SP GR UR STRIP.AUTO: 1.02 (ref 1–1.04)
UROBILINOGEN UA: NEGATIVE MG/DL
WBC #/AREA URNS AUTO: ABNORMAL /HPF

## 2022-06-04 PROCEDURE — 36415 COLL VENOUS BLD VENIPUNCTURE: CPT

## 2022-06-04 PROCEDURE — 82570 ASSAY OF URINE CREATININE: CPT

## 2022-06-04 PROCEDURE — 81001 URINALYSIS AUTO W/SCOPE: CPT

## 2022-06-04 PROCEDURE — 82306 VITAMIN D 25 HYDROXY: CPT

## 2022-06-04 PROCEDURE — 84156 ASSAY OF PROTEIN URINE: CPT

## 2022-06-04 PROCEDURE — 84165 PROTEIN E-PHORESIS SERUM: CPT | Performed by: PATHOLOGY

## 2022-06-04 PROCEDURE — 84165 PROTEIN E-PHORESIS SERUM: CPT

## 2022-06-04 PROCEDURE — 80069 RENAL FUNCTION PANEL: CPT

## 2022-06-06 LAB
ALBUMIN SERPL ELPH-MCNC: 4.02 G/DL (ref 3.5–5)
ALBUMIN SERPL ELPH-MCNC: 57.4 % (ref 52–65)
ALPHA1 GLOB SERPL ELPH-MCNC: 0.3 G/DL (ref 0.1–0.4)
ALPHA1 GLOB SERPL ELPH-MCNC: 4.3 % (ref 2.5–5)
ALPHA2 GLOB SERPL ELPH-MCNC: 0.93 G/DL (ref 0.4–1.2)
ALPHA2 GLOB SERPL ELPH-MCNC: 13.3 % (ref 7–13)
BETA GLOB ABNORMAL SERPL ELPH-MCNC: 0.48 G/DL (ref 0.4–0.8)
BETA1 GLOB SERPL ELPH-MCNC: 6.9 % (ref 5–13)
BETA2 GLOB SERPL ELPH-MCNC: 5.8 % (ref 2–8)
BETA2+GAMMA GLOB SERPL ELPH-MCNC: 0.41 G/DL (ref 0.2–0.5)
GAMMA GLOB ABNORMAL SERPL ELPH-MCNC: 0.86 G/DL (ref 0.5–1.6)
GAMMA GLOB SERPL ELPH-MCNC: 12.3 % (ref 12–22)
IGG/ALB SER: 1.35 {RATIO} (ref 1.1–1.8)
PROT PATTERN SERPL ELPH-IMP: ABNORMAL
PROT SERPL-MCNC: 7 G/DL (ref 6.4–8.2)

## 2022-06-07 ENCOUNTER — APPOINTMENT (OUTPATIENT)
Dept: LAB | Facility: HOSPITAL | Age: 62
End: 2022-06-07
Attending: INTERNAL MEDICINE
Payer: MEDICARE

## 2022-06-07 ENCOUNTER — TELEPHONE (OUTPATIENT)
Dept: OTHER | Facility: HOSPITAL | Age: 62
End: 2022-06-07

## 2022-06-07 DIAGNOSIS — R80.1 PERSISTENT PROTEINURIA: ICD-10-CM

## 2022-06-07 DIAGNOSIS — E55.9 VITAMIN D DEFICIENCY: Primary | ICD-10-CM

## 2022-06-07 LAB
CREAT UR-MCNC: 102 MG/DL
PROT UR-MCNC: 101 MG/DL
PROT/CREAT UR: 0.99 MG/G{CREAT} (ref 0–0.1)

## 2022-06-07 PROCEDURE — 83521 IG LIGHT CHAINS FREE EACH: CPT

## 2022-06-07 PROCEDURE — 36415 COLL VENOUS BLD VENIPUNCTURE: CPT

## 2022-06-07 PROCEDURE — 84166 PROTEIN E-PHORESIS/URINE/CSF: CPT | Performed by: SPECIALIST

## 2022-06-07 RX ORDER — ERGOCALCIFEROL (VITAMIN D2) 1250 MCG
50000 CAPSULE ORAL WEEKLY
Qty: 12 CAPSULE | Refills: 1 | Status: SHIPPED | OUTPATIENT
Start: 2022-06-07

## 2022-06-07 NOTE — TELEPHONE ENCOUNTER
Called and spoke to patient with regards to most recent lab work results advised of low vitamin-D level starting on ergocalciferol 75279 units p o  Q weekly for 12 weeks then over-the-counter vitamin-D 2000 units p o  q day  Advised patient some of the other testing in terms of free light chain ratio is still pending if anything comes back abnormal will be in touch with the if she does not hear back from me it means all testing is normal   We will see her back at her follow-up appointment  All questions and concerns answered

## 2022-06-08 LAB
KAPPA LC FREE SER-MCNC: 17.5 MG/L (ref 3.3–19.4)
KAPPA LC FREE/LAMBDA FREE SER: 0.88 {RATIO} (ref 0.26–1.65)
LAMBDA LC FREE SERPL-MCNC: 19.8 MG/L (ref 5.7–26.3)

## 2022-06-20 ENCOUNTER — TELEPHONE (OUTPATIENT)
Dept: NEPHROLOGY | Facility: CLINIC | Age: 62
End: 2022-06-20

## 2022-08-15 RX ORDER — CARVEDILOL 12.5 MG/1
TABLET ORAL
COMMUNITY
Start: 2022-06-18 | End: 2022-09-12

## 2022-08-16 ENCOUNTER — OFFICE VISIT (OUTPATIENT)
Dept: FAMILY MEDICINE CLINIC | Facility: CLINIC | Age: 62
End: 2022-08-16
Payer: MEDICARE

## 2022-08-16 VITALS
OXYGEN SATURATION: 98 % | TEMPERATURE: 97.9 F | RESPIRATION RATE: 16 BRPM | WEIGHT: 178 LBS | BODY MASS INDEX: 41.19 KG/M2 | HEIGHT: 55 IN | HEART RATE: 92 BPM | SYSTOLIC BLOOD PRESSURE: 138 MMHG | DIASTOLIC BLOOD PRESSURE: 70 MMHG

## 2022-08-16 DIAGNOSIS — R53.83 FATIGUE, UNSPECIFIED TYPE: ICD-10-CM

## 2022-08-16 DIAGNOSIS — E11.65 UNCONTROLLED TYPE 2 DIABETES MELLITUS WITH HYPERGLYCEMIA (HCC): Primary | ICD-10-CM

## 2022-08-16 DIAGNOSIS — E78.2 MIXED HYPERLIPIDEMIA: ICD-10-CM

## 2022-08-16 DIAGNOSIS — F33.9 DEPRESSION, RECURRENT (HCC): ICD-10-CM

## 2022-08-16 DIAGNOSIS — G57.21 FEMORAL NEUROPATHY OF RIGHT LOWER EXTREMITY: ICD-10-CM

## 2022-08-16 LAB — SL AMB POCT HEMOGLOBIN AIC: 6.7 (ref ?–6.5)

## 2022-08-16 PROCEDURE — 99214 OFFICE O/P EST MOD 30 MIN: CPT | Performed by: FAMILY MEDICINE

## 2022-08-16 PROCEDURE — 83036 HEMOGLOBIN GLYCOSYLATED A1C: CPT | Performed by: FAMILY MEDICINE

## 2022-08-16 RX ORDER — PREGABALIN 50 MG/1
50 CAPSULE ORAL 3 TIMES DAILY
Qty: 90 CAPSULE | Refills: 3 | Status: SHIPPED | OUTPATIENT
Start: 2022-08-16

## 2022-08-16 RX ORDER — BLOOD-GLUCOSE METER
KIT MISCELLANEOUS
Qty: 100 EACH | Refills: 0 | Status: SHIPPED | OUTPATIENT
Start: 2022-08-16 | End: 2022-09-19

## 2022-08-16 NOTE — ASSESSMENT & PLAN NOTE
Lab Results   Component Value Date    HGBA1C 7 1 (A) 04/13/2022   Belia Mack has diabetes type 2,     Her HbA1C shows good control: 7 1% as   04/2022  I reviewed recent labs and current medications with patient  she is on renal protection will continue same agent  losartan  Albuminuria is Present, worsened    Current treatment for diabetes was explained to patient: Metformin, which decreases hepatic glucose production and intestinal glucose absorption; also increases insulin sensitivity       The need to continue statin therapy was explained  Statin treatment is Atorvastatin  LDL is not at target last LDL of 161 mg/dl as  05/2021, requesting labs  I explained to Belia Mack the goals of blood sugar levels , with fasting  of less than 130 mg/dl and less than 150 mg/dl 2 hrs after meals  Discussed complication from uncontrolled Diabetes  The importance of following with diabetes educator ans importance of life style modification also was stressed  Feet exam: Risk Category 0 : Normal Plantar Sensation - Low Risk    Retin exam: Done previously and normal     Compliance with treatment encouraged  Call if side effect or any doubts About the treatment  Bring blood sugars log book at the next appointment

## 2022-08-16 NOTE — PROGRESS NOTES
Assessment/Plan:  1  Fatigue, unspecified type   check for anemia  - CBC and differential; Future    2  Mixed hyperlipidemia   explained about hyperlipidemia the need to continue on atorvastatin  Check lipid profile  - Lipid panel; Future      4  Femoral neuropathy of right lower extremity    Likely secondary to RO diabetes uncontrolled  Starting Lyrica  - pregabalin (LYRICA) 50 mg capsule; Take 1 capsule (50 mg total) by mouth 3 (three) times a day  Dispense: 90 capsule; Refill: 3    5  Depression, recurrent ([facility] )    She is stable in current condition, continue mental health appointments  No suicidal     Uncontrolled type 2 diabetes mellitus with hyperglycemia ([facility] )    Lab Results   Component Value Date    HGBA1C 7 1 (A) 04/13/2022   Desiree Moreno has diabetes type 2,     Her HbA1C shows good control: 7 1% as   04/2022  I reviewed recent labs and current medications with patient  she is on renal protection will continue same agent  losartan  Albuminuria is Present, worsened    Current treatment for diabetes was explained to patient: Metformin, which decreases hepatic glucose production and intestinal glucose absorption; also increases insulin sensitivity       The need to continue statin therapy was explained  Statin treatment is Atorvastatin  LDL is not at target last LDL of 161 mg/dl as  05/2021, requesting labs  I explained to Desiree Moreno the goals of blood sugar levels , with fasting  of less than 130 mg/dl and less than 150 mg/dl 2 hrs after meals  Discussed complication from uncontrolled Diabetes  The importance of following with diabetes educator ans importance of life style modification also was stressed  Feet exam: Risk Category 0 : Normal Plantar Sensation - Low Risk    Retin exam: Done previously and normal     Compliance with treatment encouraged  Call if side effect or any doubts About the treatment  Bring blood sugars log book at the next appointment         Diagnoses and all orders for this visit:    Uncontrolled type 2 diabetes mellitus with hyperglycemia (HCC)  -     POCT hemoglobin A1c  -     IRIS Diabetic eye exam    Fatigue, unspecified type  -     CBC and differential; Future    Mixed hyperlipidemia  -     Lipid panel; Future    Femoral neuropathy of right lower extremity  -     pregabalin (LYRICA) 50 mg capsule; Take 1 capsule (50 mg total) by mouth 3 (three) times a day    Depression, recurrent (HCC)    Other orders  -     carvedilol (COREG) 12 5 mg tablet          Subjective:      Patient ID: Lainey Razo is a 60 y o  female  Patient is here to follow Diabetes and HTN, patient states good compliance with treatment  Denies chest pain, shortness of breath, angina, urinary problems  No exercise but follows low salt and low carbohydrates diet  amLODIPine  atorvastatin  carvedilol  ergocalciferol  FREESTYLE LITE Strp  glucose monitoring kit  losartan  metFORMIN         She has low back pain and legs pain           amLODIPine  atorvastatin  carvedilol  ergocalciferol  FREESTYLE LITE Strp  glucose monitoring kit  losartan  metFORMIN       Lab Results       Component                Value               Date/Time                  HGBA1C                   7 1 (A)             04/13/2022 08:36 AM        HGBA1C                   6 7 (H)             05/17/2019 06:48 AM         Lab Results       Component                Value               Date/Time                  MICROALBCRE              422 (H)             04/13/2022 10:19 AM  and GFR is Lab Results       Component                Value               Date/Time                  BUN                      10                  06/04/2022 07:25 AM        EGFR                     93                  06/04/2022 07:25 AM    Lab Results       Component                Value               Date/Time                  LDLCALC                  161 (H)             05/12/2021 07:59 AM                             The following portions of the patient's history were reviewed and updated as appropriate: allergies, current medications, past family history, past medical history, past social history, past surgical history and problem list     Review of Systems   Constitutional: Negative for diaphoresis, fatigue, fever and unexpected weight change  Respiratory: Negative for cough, chest tightness, shortness of breath and wheezing  Cardiovascular: Negative for chest pain, palpitations and leg swelling  Gastrointestinal: Negative for abdominal pain, blood in stool and constipation  Musculoskeletal: Positive for gait problem (  Due to pain on the right thigh ) and myalgias  Neurological: Negative for dizziness, syncope, light-headedness and headaches  Hematological: Does not bruise/bleed easily  Psychiatric/Behavioral: Negative for behavioral problems, self-injury and sleep disturbance  The patient is not nervous/anxious  Objective:      /70 (BP Location: Left arm, Patient Position: Sitting, Cuff Size: Standard)   Pulse 92   Temp 97 9 °F (36 6 °C) (Temporal)   Resp 16   Ht 4' 4" (1 321 m)   Wt 80 7 kg (178 lb)   SpO2 98%   Breastfeeding No   BMI 46 28 kg/m²          Physical Exam  Cardiovascular:      Rate and Rhythm: Normal rate and regular rhythm  Pulses: no weak pulses          Dorsalis pedis pulses are 2+ on the right side and 2+ on the left side  Posterior tibial pulses are 2+ on the right side and 2+ on the left side  Pulmonary:      Effort: Pulmonary effort is normal       Breath sounds: Normal breath sounds  Musculoskeletal:         General: Tenderness ( anterior aspect of the right thigh in the territory of femoral nerve) present  Cervical back: Neck supple  Feet:    Feet:      Right foot:      Skin integrity: Callus and dry skin present  No ulcer, skin breakdown, erythema or warmth  Left foot:      Skin integrity: Callus and dry skin present  No ulcer, skin breakdown, erythema or warmth     Neurological: General: No focal deficit present  Mental Status: She is alert and oriented to person, place, and time  Psychiatric:         Behavior: Behavior normal        Patient's shoes and socks removed  Right Foot/Ankle   Right Foot Inspection  Skin Exam: skin normal, skin intact, dry skin, callus and callus  No warmth, no erythema, no maceration, no abnormal color, no pre-ulcer and no ulcer  Toe Exam: ROM and strength within normal limits  No swelling, no tenderness, erythema and  no right toe deformity    Sensory   Vibration: intact  Proprioception: intact  Monofilament testing: intact    Vascular  Capillary refills: < 3 seconds  The right DP pulse is 2+  The right PT pulse is 2+  Left Foot/Ankle  Left Foot Inspection  Skin Exam: skin normal, skin intact, dry skin and callus  No warmth, no erythema, no maceration, normal color, no pre-ulcer and no ulcer  Toe Exam: ROM and strength within normal limits  No swelling, no tenderness, no erythema and no left toe deformity  Sensory   Vibration: intact  Proprioception: intact  Monofilament testing: intact    Vascular  Capillary refills: < 3 seconds  The left DP pulse is 2+  The left PT pulse is 2+       Assign Risk Category  No deformity present  No loss of protective sensation  No weak pulses  Risk: 0

## 2022-08-17 LAB
LEFT EYE DIABETIC RETINOPATHY: NORMAL
LEFT EYE IMAGE QUALITY: NORMAL
LEFT EYE MACULAR EDEMA: NORMAL
LEFT EYE OTHER RETINOPATHY: NORMAL
RIGHT EYE DIABETIC RETINOPATHY: NORMAL
RIGHT EYE IMAGE QUALITY: NORMAL
RIGHT EYE MACULAR EDEMA: NORMAL
RIGHT EYE OTHER RETINOPATHY: NORMAL
SEVERITY (EYE EXAM): NORMAL

## 2022-08-29 NOTE — RESULT ENCOUNTER NOTE
Dear Ludmila Mercer: The eye exam was reported without signs of diabetic damage  Congratulations!!     Estimada Tova:  El examen de la vista se informó sin signos de daño diabético  ¡¡Felicidades!!

## 2022-09-12 DIAGNOSIS — E66.01 MORBID OBESITY WITH BMI OF 50.0-59.9, ADULT (HCC): Primary | ICD-10-CM

## 2022-09-12 DIAGNOSIS — I10 HYPERTENSION, UNSPECIFIED TYPE: ICD-10-CM

## 2022-09-12 RX ORDER — CARVEDILOL 12.5 MG/1
TABLET ORAL
Qty: 180 TABLET | Refills: 0 | Status: SHIPPED | OUTPATIENT
Start: 2022-09-12 | End: 2022-10-24 | Stop reason: SDUPTHER

## 2022-09-19 DIAGNOSIS — E11.65 UNCONTROLLED TYPE 2 DIABETES MELLITUS WITH HYPERGLYCEMIA (HCC): ICD-10-CM

## 2022-09-19 RX ORDER — BLOOD-GLUCOSE METER
KIT MISCELLANEOUS
Qty: 100 STRIP | Refills: 3 | Status: SHIPPED | OUTPATIENT
Start: 2022-09-19

## 2022-09-28 DIAGNOSIS — F32.9 REACTIVE DEPRESSION: ICD-10-CM

## 2022-09-30 DIAGNOSIS — E11.9 TYPE 2 DIABETES MELLITUS WITHOUT COMPLICATION, UNSPECIFIED WHETHER LONG TERM INSULIN USE (HCC): Primary | ICD-10-CM

## 2022-09-30 RX ORDER — LANCETS
EACH MISCELLANEOUS DAILY
Qty: 100 EACH | Refills: 5 | Status: SHIPPED | OUTPATIENT
Start: 2022-09-30

## 2022-10-04 RX ORDER — TRAZODONE HYDROCHLORIDE 50 MG/1
TABLET ORAL
Qty: 30 TABLET | Refills: 4 | Status: SHIPPED | OUTPATIENT
Start: 2022-10-04

## 2022-10-18 DIAGNOSIS — R80.1 PERSISTENT PROTEINURIA: ICD-10-CM

## 2022-10-18 DIAGNOSIS — E55.9 VITAMIN D DEFICIENCY: ICD-10-CM

## 2022-10-18 RX ORDER — ERGOCALCIFEROL (VITAMIN D2) 1250 MCG
50000 CAPSULE ORAL WEEKLY
Qty: 12 CAPSULE | Refills: 3 | Status: SHIPPED | OUTPATIENT
Start: 2022-10-18

## 2022-10-24 DIAGNOSIS — I10 HYPERTENSION, UNSPECIFIED TYPE: ICD-10-CM

## 2022-10-24 DIAGNOSIS — E11.29 TYPE 2 DIABETES MELLITUS WITH MICROALBUMINURIA, WITHOUT LONG-TERM CURRENT USE OF INSULIN (HCC): ICD-10-CM

## 2022-10-24 DIAGNOSIS — R80.9 TYPE 2 DIABETES MELLITUS WITH MICROALBUMINURIA, WITHOUT LONG-TERM CURRENT USE OF INSULIN (HCC): ICD-10-CM

## 2022-10-24 RX ORDER — AMLODIPINE BESYLATE 5 MG/1
5 TABLET ORAL DAILY
Qty: 90 TABLET | Refills: 3 | Status: SHIPPED | OUTPATIENT
Start: 2022-10-24

## 2022-10-24 RX ORDER — CARVEDILOL 12.5 MG/1
12.5 TABLET ORAL 2 TIMES DAILY WITH MEALS
Qty: 180 TABLET | Refills: 3 | Status: SHIPPED | OUTPATIENT
Start: 2022-10-24

## 2022-10-24 RX ORDER — DULAGLUTIDE 1.5 MG/.5ML
1.5 INJECTION, SOLUTION SUBCUTANEOUS WEEKLY
Qty: 6 ML | Refills: 3 | Status: SHIPPED | OUTPATIENT
Start: 2022-10-24 | End: 2022-11-15

## 2022-10-24 RX ORDER — LOSARTAN POTASSIUM 50 MG/1
50 TABLET ORAL 2 TIMES DAILY
Qty: 180 TABLET | Refills: 3 | Status: SHIPPED | OUTPATIENT
Start: 2022-10-24

## 2022-10-24 NOTE — TELEPHONE ENCOUNTER
Patient was called and told of medications that were sent at her request for a 2 month supply  Patient states that she did not request Carvedilol  Patient states she has not taken med in over 6 months   She states she need Amlodipine and Losartan to be snet to the pharmacy

## 2022-10-29 DIAGNOSIS — E11.65 UNCONTROLLED TYPE 2 DIABETES MELLITUS WITH HYPERGLYCEMIA (HCC): ICD-10-CM

## 2022-11-01 DIAGNOSIS — G57.21 FEMORAL NEUROPATHY OF RIGHT LOWER EXTREMITY: ICD-10-CM

## 2022-11-01 RX ORDER — PREGABALIN 50 MG/1
50 CAPSULE ORAL 3 TIMES DAILY
Qty: 90 CAPSULE | Refills: 3 | Status: SHIPPED | OUTPATIENT
Start: 2022-11-01

## 2022-11-11 ENCOUNTER — VBI (OUTPATIENT)
Dept: ADMINISTRATIVE | Facility: OTHER | Age: 62
End: 2022-11-11

## 2023-01-17 DIAGNOSIS — I10 ESSENTIAL HYPERTENSION: ICD-10-CM

## 2023-01-17 RX ORDER — ATORVASTATIN CALCIUM 40 MG/1
TABLET, FILM COATED ORAL
Qty: 90 TABLET | Refills: 2 | Status: SHIPPED | OUTPATIENT
Start: 2023-01-17

## 2023-01-31 DIAGNOSIS — F32.9 REACTIVE DEPRESSION: ICD-10-CM

## 2023-01-31 RX ORDER — TRAZODONE HYDROCHLORIDE 50 MG/1
TABLET ORAL
Qty: 90 TABLET | Refills: 3 | Status: SHIPPED | OUTPATIENT
Start: 2023-01-31

## 2023-02-15 RX ORDER — ALCOHOL ANTISEPTIC PADS
PADS, MEDICATED (EA) TOPICAL
COMMUNITY
Start: 2023-01-31

## 2023-02-20 ENCOUNTER — OFFICE VISIT (OUTPATIENT)
Dept: FAMILY MEDICINE CLINIC | Facility: CLINIC | Age: 63
End: 2023-02-20

## 2023-02-20 ENCOUNTER — LAB (OUTPATIENT)
Dept: LAB | Facility: HOSPITAL | Age: 63
End: 2023-02-20

## 2023-02-20 VITALS
HEIGHT: 55 IN | SYSTOLIC BLOOD PRESSURE: 130 MMHG | HEART RATE: 84 BPM | OXYGEN SATURATION: 98 % | TEMPERATURE: 98 F | DIASTOLIC BLOOD PRESSURE: 70 MMHG | RESPIRATION RATE: 16 BRPM | BODY MASS INDEX: 39.81 KG/M2 | WEIGHT: 172 LBS

## 2023-02-20 DIAGNOSIS — E66.01 MORBID OBESITY WITH BMI OF 45.0-49.9, ADULT (HCC): ICD-10-CM

## 2023-02-20 DIAGNOSIS — Z12.31 ENCOUNTER FOR SCREENING MAMMOGRAM FOR MALIGNANT NEOPLASM OF BREAST: ICD-10-CM

## 2023-02-20 DIAGNOSIS — E11.65 UNCONTROLLED TYPE 2 DIABETES MELLITUS WITH HYPERGLYCEMIA (HCC): ICD-10-CM

## 2023-02-20 DIAGNOSIS — Z00.00 MEDICARE ANNUAL WELLNESS VISIT, SUBSEQUENT: ICD-10-CM

## 2023-02-20 DIAGNOSIS — E11.29 TYPE 2 DIABETES MELLITUS WITH MICROALBUMINURIA, WITHOUT LONG-TERM CURRENT USE OF INSULIN (HCC): ICD-10-CM

## 2023-02-20 DIAGNOSIS — E78.2 MIXED HYPERLIPIDEMIA: Primary | ICD-10-CM

## 2023-02-20 DIAGNOSIS — M79.2 NEURALGIA OF RIGHT THIGH: ICD-10-CM

## 2023-02-20 DIAGNOSIS — Z00.00 MEDICARE ANNUAL WELLNESS VISIT, SUBSEQUENT: Primary | ICD-10-CM

## 2023-02-20 DIAGNOSIS — F33.9 DEPRESSION, RECURRENT (HCC): ICD-10-CM

## 2023-02-20 DIAGNOSIS — R80.9 TYPE 2 DIABETES MELLITUS WITH MICROALBUMINURIA, WITHOUT LONG-TERM CURRENT USE OF INSULIN (HCC): ICD-10-CM

## 2023-02-20 LAB
ALBUMIN SERPL BCP-MCNC: 4.4 G/DL (ref 3.5–5)
ALP SERPL-CCNC: 105 U/L (ref 43–122)
ALT SERPL W P-5'-P-CCNC: 15 U/L
ANION GAP SERPL CALCULATED.3IONS-SCNC: 7 MMOL/L (ref 5–14)
AST SERPL W P-5'-P-CCNC: 23 U/L (ref 14–36)
BASOPHILS # BLD AUTO: 0.05 THOUSANDS/ÂΜL (ref 0–0.1)
BASOPHILS NFR BLD AUTO: 1 % (ref 0–1)
BILIRUB SERPL-MCNC: 0.37 MG/DL (ref 0.2–1)
BUN SERPL-MCNC: 15 MG/DL (ref 5–25)
CALCIUM SERPL-MCNC: 9.5 MG/DL (ref 8.4–10.2)
CHLORIDE SERPL-SCNC: 104 MMOL/L (ref 96–108)
CHOLEST SERPL-MCNC: 220 MG/DL
CO2 SERPL-SCNC: 28 MMOL/L (ref 21–32)
CREAT SERPL-MCNC: 0.64 MG/DL (ref 0.6–1.2)
EOSINOPHIL # BLD AUTO: 0.2 THOUSAND/ÂΜL (ref 0–0.61)
EOSINOPHIL NFR BLD AUTO: 2 % (ref 0–6)
ERYTHROCYTE [DISTWIDTH] IN BLOOD BY AUTOMATED COUNT: 13.8 % (ref 11.6–15.1)
GFR SERPL CREATININE-BSD FRML MDRD: 95 ML/MIN/1.73SQ M
GLUCOSE P FAST SERPL-MCNC: 105 MG/DL (ref 70–99)
HCT VFR BLD AUTO: 41.2 % (ref 34.8–46.1)
HDLC SERPL-MCNC: 52 MG/DL
HGB BLD-MCNC: 13.3 G/DL (ref 11.5–15.4)
IMM GRANULOCYTES # BLD AUTO: 0.02 THOUSAND/UL (ref 0–0.2)
IMM GRANULOCYTES NFR BLD AUTO: 0 % (ref 0–2)
LDLC SERPL CALC-MCNC: 151 MG/DL
LYMPHOCYTES # BLD AUTO: 2.3 THOUSANDS/ÂΜL (ref 0.6–4.47)
LYMPHOCYTES NFR BLD AUTO: 27 % (ref 14–44)
MCH RBC QN AUTO: 29.7 PG (ref 26.8–34.3)
MCHC RBC AUTO-ENTMCNC: 32.3 G/DL (ref 31.4–37.4)
MCV RBC AUTO: 92 FL (ref 82–98)
MONOCYTES # BLD AUTO: 0.5 THOUSAND/ÂΜL (ref 0.17–1.22)
MONOCYTES NFR BLD AUTO: 6 % (ref 4–12)
NEUTROPHILS # BLD AUTO: 5.42 THOUSANDS/ÂΜL (ref 1.85–7.62)
NEUTS SEG NFR BLD AUTO: 64 % (ref 43–75)
NONHDLC SERPL-MCNC: 168 MG/DL
NRBC BLD AUTO-RTO: 0 /100 WBCS
PLATELET # BLD AUTO: 341 THOUSANDS/UL (ref 149–390)
PMV BLD AUTO: 10.5 FL (ref 8.9–12.7)
POTASSIUM SERPL-SCNC: 4.7 MMOL/L (ref 3.5–5.3)
PROT SERPL-MCNC: 8.1 G/DL (ref 6.4–8.4)
RBC # BLD AUTO: 4.48 MILLION/UL (ref 3.81–5.12)
SL AMB POCT HEMOGLOBIN AIC: 6.3 (ref ?–6.5)
SODIUM SERPL-SCNC: 139 MMOL/L (ref 135–147)
TRIGL SERPL-MCNC: 83 MG/DL
WBC # BLD AUTO: 8.49 THOUSAND/UL (ref 4.31–10.16)

## 2023-02-20 RX ORDER — EZETIMIBE 10 MG/1
10 TABLET ORAL DAILY
Qty: 30 TABLET | Refills: 5 | Status: SHIPPED | OUTPATIENT
Start: 2023-02-20

## 2023-02-20 RX ORDER — DULAGLUTIDE 3 MG/.5ML
3 INJECTION, SOLUTION SUBCUTANEOUS
Qty: 2 ML | Refills: 5 | Status: SHIPPED | OUTPATIENT
Start: 2023-02-20

## 2023-02-20 RX ORDER — CARBAMAZEPINE 100 MG/1
100 CAPSULE, EXTENDED RELEASE ORAL 2 TIMES DAILY
Qty: 60 CAPSULE | Refills: 5 | Status: SHIPPED | OUTPATIENT
Start: 2023-02-20

## 2023-02-20 NOTE — ASSESSMENT & PLAN NOTE
During this visit we have a goal to personalize prevention  I discussed the patient about    Patient Active Problem List   Diagnosis   • Essential hypertension   • Medicare annual wellness visit, subsequent   • Hyperlipidemia   • LFT elevation   • Chronic low back pain with right-sided sciatica   • Uncontrolled type 2 diabetes mellitus with hyperglycemia ([facility] )   • Diabetic gastroparesis associated with type 2 diabetes mellitus ([facility] )   • Acute non-recurrent frontal sinusitis   • Needs flu shot   • Anxiety   • Left foot pain   • Morbid obesity with BMI of 45 0-49 9, adult ([facility])   • Localized edema   • Thoracic spine pain   • Acute right-sided low back pain without sciatica   • Pain in right upper arm   • CKD (chronic kidney disease) stage 1, GFR 90 ml/min or greater   • Persistent proteinuria   • Depression, recurrent ([facility])   • Type 2 diabetes mellitus with microalbuminuria, without long-term current use of insulin ([facility])   • Neuralgia of right thigh    and decreased strength and decreased ROM  Life style plan to decrease the impact of current problems  Health risk assessment was discussed with patient also and the ways to stay healthier  We reviewed also the current medications, the need to avoid polypharmacy in her current treatment; also about how the chronic conditions are impacting now and later  Recommended a healthy diet and exercising frequently will help to control better patient's current chronic conditions;  Immunizations, and the need to compliance with current CDC's recommendations  Patient declined at this time advanced directives  I encouraged against the use alcohol, tobacco, recreational illegal prescribed and non-prescribed drugs  About smoking: recommended quit smoking

## 2023-02-20 NOTE — PROGRESS NOTES
Assessment and Plan:     Problem List Items Addressed This Visit     Depression, recurrent ([facility] )     She is a stable, in remission         Medicare annual wellness visit, subsequent - Primary     During this visit we have a goal to personalize prevention  I discussed the patient about    Patient Active Problem List   Diagnosis   • Essential hypertension   • Medicare annual wellness visit, subsequent   • Hyperlipidemia   • LFT elevation   • Chronic low back pain with right-sided sciatica   • Uncontrolled type 2 diabetes mellitus with hyperglycemia ([facility] )   • Diabetic gastroparesis associated with type 2 diabetes mellitus ([facility] )   • Acute non-recurrent frontal sinusitis   • Needs flu shot   • Anxiety   • Left foot pain   • Morbid obesity with BMI of 45 0-49 9, adult ([facility])   • Localized edema   • Thoracic spine pain   • Acute right-sided low back pain without sciatica   • Pain in right upper arm   • CKD (chronic kidney disease) stage 1, GFR 90 ml/min or greater   • Persistent proteinuria   • Depression, recurrent ([facility])   • Type 2 diabetes mellitus with microalbuminuria, without long-term current use of insulin ([facility])   • Neuralgia of right thigh    and decreased strength and decreased ROM  Life style plan to decrease the impact of current problems  Health risk assessment was discussed with patient also and the ways to stay healthier  We reviewed also the current medications, the need to avoid polypharmacy in her current treatment; also about how the chronic conditions are impacting now and later  Recommended a healthy diet and exercising frequently will help to control better patient's current chronic conditions;  Immunizations, and the need to compliance with current CDC's recommendations  Patient declined at this time advanced directives  I encouraged against the use alcohol, tobacco, recreational illegal prescribed and non-prescribed drugs  About smoking: recommended quit smoking                  Relevant Orders CBC and differential    Comprehensive metabolic panel    Lipid panel    Morbid obesity with BMI of 45 0-49 9, adult (HCC)     Improved weight control  Continue same treatment  Neuralgia of right thigh     Failed to control pain with different options: Gabapentin and Lyrica  Patient will start carbamazepine 100 mg twice a day  CBC will be requested and in 1 month  EMG is requested         Relevant Medications    carbamazepine (CARBATROL) 100 MG 12 hr capsule    Other Relevant Orders    EMG 2 limb lower extremity    Type 2 diabetes mellitus with microalbuminuria, without long-term current use of insulin ([facility])       Lab Results   Component Value Date    HGBA1C 6 3 02/20/2023   Improved control of diabetes  Increased dose of Trulicity from 8 5LR to 3 mg will help with better control diabetes and weight reduction  Continue care with nephrologist for albuminuria  Continue on losartan 50 mg for kidney protection  Relevant Medications    dulaglutide (Trulicity) 3 JF/8 8ZX injection    Uncontrolled type 2 diabetes mellitus with hyperglycemia ([facility])    Relevant Medications    dulaglutide (Trulicity) 3 IQ/7 4CE injection    Other Relevant Orders    POCT hemoglobin A1c (Completed)        Preventive health issues were discussed with patient, and age appropriate screening tests were ordered as noted in patient's After Visit Summary  Personalized health advice and appropriate referrals for health education or preventive services given if needed, as noted in patient's After Visit Summary  BMI Counseling: Body mass index is 45 59 kg/m²  The BMI is above normal  Nutrition recommendations include reducing portion sizes  Exercise recommendations include exercising 3-5 times per week  History of Present Illness:     Patient presents for a Medicare Wellness Visit    Patient is here to follow Diabetes and HTN, patient states good compliance with treatment   Denies chest pain, shortness of breath, angina, urinary problems  No exercise but follows low salt and low carbohydrates diet  amLODIPine  atorvastatin  carvedilol  ergocalciferol  losartan  metFORMIN  pregabalin  traZODone      She continues complaining of right pain radiates her knee and upper part of lower right leg  She has difficulty walking due to the pain  She has difficult to tolerate gabapentin and Lyrica in the past   Only medication that she can take for pain is Tylenol  She follows with nephrologist for proteinuria          Patient Care Team:  Shane Roman MD as PCP - General (Family Medicine)  Stephanie Beltran DO as Endoscopist     Review of Systems:     Review of Systems   Constitutional: Negative for diaphoresis, fatigue, fever and unexpected weight change  Respiratory: Negative for cough, chest tightness, shortness of breath and wheezing  Cardiovascular: Negative for chest pain, palpitations and leg swelling  Gastrointestinal: Negative for abdominal pain, blood in stool and constipation  Neurological: Negative for dizziness, syncope, light-headedness and headaches  Hematological: Does not bruise/bleed easily  Psychiatric/Behavioral: Negative for behavioral problems, self-injury and sleep disturbance  The patient is not nervous/anxious           Problem List:     Patient Active Problem List   Diagnosis   • Essential hypertension   • Medicare annual wellness visit, subsequent   • Hyperlipidemia   • LFT elevation   • Chronic low back pain with right-sided sciatica   • Uncontrolled type 2 diabetes mellitus with hyperglycemia ([facility] )   • Diabetic gastroparesis associated with type 2 diabetes mellitus ([facility] )   • Acute non-recurrent frontal sinusitis   • Needs flu shot   • Anxiety   • Left foot pain   • Morbid obesity with BMI of 45 0-49 9, adult ([facility])   • Localized edema   • Thoracic spine pain   • Acute right-sided low back pain without sciatica   • Pain in right upper arm   • CKD (chronic kidney disease) stage 1, GFR 90 ml/min or greater • Persistent proteinuria   • Depression, recurrent (HCC)   • Type 2 diabetes mellitus with microalbuminuria, without long-term current use of insulin (HCC)   • Neuralgia of right thigh      Past Medical and Surgical History:     Past Medical History:   Diagnosis Date   • Back problem     herniated discs   • Chronic pain disorder     back   • Diabetes mellitus ([facility] )    • Diverticulitis    • Diverticulosis    • Hyperglycemia    • Hyperlipidemia    • Hypertension    • Impacted cerumen    • Obesity    • Palpitations      Past Surgical History:   Procedure Laterality Date   • CARPAL TUNNEL RELEASE Right 1983   • DIAGNOSTIC LAPAROSCOPY     • ESOPHAGOGASTRODUODENOSCOPY  10/2018    gastroduodenitis   • PERIPHERAL ANGIOGRAM     • [place] COLONOSCOPY FLX DX W/COLLJ SPEC WHEN PFRMD N/A 9/12/2018    dr chowdhury, diverticulosis   • [place] ESOPHAGOGASTRODUODENOSCOPY TRANSORAL DIAGNOSTIC N/A 10/10/2018    Procedure: EGD;  Surgeon: Adam Mock DO;  Location: [facility] GI LAB;   Service: General      Family History:     Family History   Problem Relation Age of Onset   • Kidney disease Mother    • Hypertension Mother    • Coronary artery disease Father         premature   • Diabetes Sister    • Coronary artery disease Sister    • Coronary artery disease Maternal Grandfather    • Diabetes Paternal Grandfather    • Coronary artery disease Paternal Grandfather    • Breast cancer Paternal Aunt       Social History:     Social History     Socioeconomic History   • Marital status:      Spouse name: None   • Number of children: None   • Years of education: None   • Highest education level: None   Occupational History   • None   Tobacco Use   • Smoking status: Never   • Smokeless tobacco: Never   Substance and Sexual Activity   • Alcohol use: No   • Drug use: No   • Sexual activity: Yes     Partners: Male   Other Topics Concern   • None   Social History Narrative   • None     Social Determinants of Health     Financial Resource Strain: Low Risk    • Difficulty of Paying Living Expenses: Not hard at all   Food Insecurity: Not on file   Transportation Needs: No Transportation Needs   • Lack of Transportation (Medical): No   • Lack of Transportation (Non-Medical): No   Physical Activity: Not on file   Stress: Not on file   Social Connections: Not on file   Intimate Partner Violence: Not on file   Housing Stability: Not on file      Medications and Allergies:     Current Outpatient Medications   Medication Sig Dispense Refill   • Accu-Chek FastClix Lancets MISC Use in the morning 100 each 5   • amLODIPine (NORVASC) 5 mg tablet Take 1 tablet (5 mg total) by mouth daily 90 tablet 3   • carbamazepine (CARBATROL) 100 MG 12 hr capsule Take 1 capsule (100 mg total) by mouth 2 (two) times a day 60 capsule 5   • carvedilol (COREG) 12 5 mg tablet Take 1 tablet (12 5 mg total) by mouth 2 (two) times a day with meals 180 tablet 3   • dulaglutide (Trulicity) 3 AF/4 7UV injection Inject 0 5 mL (3 mg total) under the skin every 7 days 2 mL 5   • losartan (Cozaar) 50 mg tablet Take 1 tablet (50 mg total) by mouth 2 (two) times a day 180 tablet 3   • atorvastatin (LIPITOR) 40 mg tablet TAKE ONE TABLET BY MOUTH ONCE DAILY 90 tablet 2   • ergocalciferol (ERGOCALCIFEROL) 1 25 MG (60817 UT) capsule Take 1 capsule (50,000 Units total) by mouth once a week 12 capsule 3   • glucose blood (FREESTYLE LITE) test strip USE TO TEST THE BLOOD SUGAR THREE TIMES A  strip 3   • metFORMIN (GLUCOPHAGE) 500 mg tablet TAKE ONE TABLET BY MOUTH TWICE A DAY WITH MEALS 180 tablet 1   • traZODone (DESYREL) 50 mg tablet TAKE ONE TABLET BY MOUTH ONCE DAILY AT BEDTIME 90 tablet 3   • UltiCare Alcohol Swabs 70 % PADS        No current facility-administered medications for this visit       Allergies   Allergen Reactions   • Vicodin [Hydrocodone-Acetaminophen] Angioedema   • Percocet [Oxycodone-Acetaminophen] GI Intolerance   • Gabapentin GI Intolerance     vomiting   • Lyrica [Pregabalin] GI Intolerance Stomach ache  • Aspirin GI Intolerance   • Diclofenac Sodium Rash      Immunizations:     Immunization History   Administered Date(s) Administered   • COVID-19 MODERNA VACC 0 5 ML IM 03/30/2021, 05/03/2021, 12/27/2021, 08/12/2022   • Hep B, Adolescent or Pediatric 07/17/2017, 12/11/2017   • Hep B, adult 04/09/2018   • INFLUENZA 10/08/2012, 12/11/2017   • Influenza, recombinant, quadrivalent,injectable, preservative free 02/22/2019, 10/22/2019, 10/02/2020, 10/20/2021   • Pneumococcal Polysaccharide PPV23 11/23/2018   • Td (adult), adsorbed 01/22/2018   • Tdap 01/22/2018, 01/22/2018      Health Maintenance:         Topic Date Due   • Cervical Cancer Screening  Never done   • Breast Cancer Screening: Mammogram  05/12/2022   • Colorectal Cancer Screening  09/12/2028   • HIV Screening  Completed   • Hepatitis C Screening  Completed         Topic Date Due   • Pneumococcal Vaccine: Pediatrics (0 to 5 Years) and At-Risk Patients (6 to 59 Years) (2 - PCV) 11/23/2019   • Influenza Vaccine (1) 09/01/2022   • COVID-19 Vaccine (5 - Booster for Ana Laura Doss series) 10/07/2022      Medicare Screening Tests and Risk Assessments:     Yancy Crain is here for her Subsequent Wellness visit  Last Medicare Wellness visit information reviewed, patient interviewed and updates made to the record today  Health Risk Assessment:   Patient rates overall health as fair  Patient feels that their physical health rating is slightly worse  Patient is satisfied with their life  Eyesight was rated as slightly worse  Hearing was rated as same  Patient feels that their emotional and mental health rating is same  Patients states they are never, rarely angry  Patient states they are sometimes unusually tired/fatigued  Pain experienced in the last 7 days has been some  Patient's pain rating has been 4/10  Patient states that she has experienced no weight loss or gain in last 6 months  Depression Screening:   PHQ-9 Score: 6      Fall Risk Screening:    In the past year, patient has experienced: no history of falling in past year      Urinary Incontinence Screening:   Patient has not leaked urine accidently in the last six months  Home Safety:  Patient has trouble with stairs inside or outside of their home  Patient has working smoke alarms and has working carbon monoxide detector  Home safety hazards include: none  Nutrition:   Current diet is Diabetic  Medications:   Patient is currently taking over-the-counter supplements  OTC medications include: see medication list  Patient is able to manage medications  Activities of Daily Living (ADLs)/Instrumental Activities of Daily Living (IADLs):   Walk and transfer into and out of bed and chair?: Yes  Dress and groom yourself?: Yes    Bathe or shower yourself?: Yes    Feed yourself?  Yes  Do your laundry/housekeeping?: Yes  Manage your money, pay your bills and track your expenses?: Yes  Make your own meals?: Yes    Do your own shopping?: Yes    Previous Hospitalizations:   Any hospitalizations or ED visits within the last 12 months?: Yes    How many hospitalizations have you had in the last year?: 1-2    Advance Care Planning:   Living will: No    Durable POA for healthcare: No    Advanced directive: No    Advanced directive counseling given: Yes    Five wishes given: Yes    Patient declined ACP directive: No    End of Life Decisions reviewed with patient: Yes    Provider agrees with end of life decisions: Yes      Cognitive Screening:   Provider or family/friend/caregiver concerned regarding cognition?: No    PREVENTIVE SCREENINGS      Cardiovascular Screening:    General: Screening Not Indicated and History Lipid Disorder    Due for: Lipid Panel      Diabetes Screening:     General: Screening Not Indicated and History Diabetes    Due for: Blood Glucose      Colorectal Cancer Screening:     General: Screening Current    Due for: FOBT/FIT      Breast Cancer Screening:     General: Screening Current Cervical Cancer Screening:    General: Screening Not Indicated      Osteoporosis Screening:    General: Risks and Benefits Discussed    Due for: DXA Axial      Abdominal Aortic Aneurysm (AAA) Screening:        General: Screening Not Indicated      Lung Cancer Screening:     General: Screening Not Indicated      Hepatitis C Screening:    General: Screening Current    Hep C Screening Accepted: Yes      Screening, Brief Intervention, and Referral to Treatment (SBIRT)    Screening  Typical number of drinks in a day: 0  Typical number of drinks in a week: 0  Interpretation: Low risk drinking behavior  Single Item Drug Screening:  How often have you used an illegal drug (including marijuana) or a prescription medication for non-medical reasons in the past year? never    Single Item Drug Screen Score: 0  Interpretation: Negative screen for possible drug use disorder    Other Counseling Topics:   Alcohol use counseling, car/seat belt/driving safety, skin self-exam, sunscreen and calcium and vitamin D intake and regular weightbearing exercise  No results found       Physical Exam:     /70 (BP Location: Left arm, Patient Position: Sitting, Cuff Size: Standard)   Pulse 84   Temp 98 °F (36 7 °C) (Tympanic)   Resp 16   Ht 4' 3 5" (1 308 m)   Wt 78 kg (172 lb)   SpO2 98%   BMI 45 59 kg/m²     Physical Exam     Meek Louis MD

## 2023-02-20 NOTE — ASSESSMENT & PLAN NOTE
Lab Results   Component Value Date    HGBA1C 6 3 02/20/2023   Improved control of diabetes  Increased dose of Trulicity from 7 3SF to 3 mg will help with better control diabetes and weight reduction  Continue care with nephrologist for albuminuria  Continue on losartan 50 mg for kidney protection

## 2023-02-20 NOTE — PATIENT INSTRUCTIONS
Medicare Preventive Visit Patient Instructions  Thank you for completing your Welcome to Medicare Visit or Medicare Annual Wellness Visit today  Your next wellness visit will be due in one year (2/21/2024)  The screening/preventive services that you may require over the next 5-10 years are detailed below  Some tests may not apply to you based off risk factors and/or age  Screening tests ordered at today's visit but not completed yet may show as past due  Also, please note that scanned in results may not display below  Preventive Screenings:  Service Recommendations Previous Testing/Comments   Colorectal Cancer Screening  * Colonoscopy    * Fecal Occult Blood Test (FOBT)/Fecal Immunochemical Test (FIT)  * Fecal DNA/Cologuard Test  * Flexible Sigmoidoscopy Age: 39-70 years old   Colonoscopy: every 10 years (may be performed more frequently if at higher risk)  OR  FOBT/FIT: every 1 year  OR  Cologuard: every 3 years  OR  Sigmoidoscopy: every 5 years  Screening may be recommended earlier than age 39 if at higher risk for colorectal cancer  Also, an individualized decision between you and your healthcare provider will decide whether screening between the ages of 74-80 would be appropriate  Colonoscopy: 09/12/2018  FOBT/FIT: Not on file  Cologuard: Not on file  Sigmoidoscopy: Not on file    Screening Current     Breast Cancer Screening Age: 36 years old  Frequency: every 1-2 years  Not required if history of left and right mastectomy Mammogram: 05/12/2021    Screening Current   Cervical Cancer Screening Between the ages of 21-29, pap smear recommended once every 3 years  Between the ages of 33-67, can perform pap smear with HPV co-testing every 5 years     Recommendations may differ for women with a history of total hysterectomy, cervical cancer, or abnormal pap smears in past  Pap Smear: Not on file        Hepatitis C Screening Once for adults born between 1945 and 1965  More frequently in patients at high risk for Hepatitis C Hep C Antibody: 08/17/2018    Screening Current   Diabetes Screening 1-2 times per year if you're at risk for diabetes or have pre-diabetes Fasting glucose: 118 mg/dL (6/4/2022)  A1C: 6 7 (8/16/2022)  Screening Not Indicated  History Diabetes   Cholesterol Screening Once every 5 years if you don't have a lipid disorder  May order more often based on risk factors  Lipid panel: 05/12/2021    Screening Not Indicated  History Lipid Disorder     Other Preventive Screenings Covered by Medicare:  1  Abdominal Aortic Aneurysm (AAA) Screening: covered once if your at risk  You're considered to be at risk if you have a family history of AAA  2  Lung Cancer Screening: covers low dose CT scan once per year if you meet all of the following conditions: (1) Age 50-69; (2) No signs or symptoms of lung cancer; (3) Current smoker or have quit smoking within the last 15 years; (4) You have a tobacco smoking history of at least 20 pack years (packs per day multiplied by number of years you smoked); (5) You get a written order from a healthcare provider  3  Glaucoma Screening: covered annually if you're considered high risk: (1) You have diabetes OR (2) Family history of glaucoma OR (3)  aged 48 and older OR (3)  American aged 72 and older  3  Osteoporosis Screening: covered every 2 years if you meet one of the following conditions: (1) You're estrogen deficient and at risk for osteoporosis based off medical history and other findings; (2) Have a vertebral abnormality; (3) On glucocorticoid therapy for more than 3 months; (4) Have primary hyperparathyroidism; (5) On osteoporosis medications and need to assess response to drug therapy  · Last bone density test (DXA Scan): Not on file  5  HIV Screening: covered annually if you're between the age of 12-76  Also covered annually if you are younger than 13 and older than 72 with risk factors for HIV infection   For pregnant patients, it is covered up to 3 times per pregnancy  Immunizations:  Immunization Recommendations   Influenza Vaccine Annual influenza vaccination during flu season is recommended for all persons aged >= 6 months who do not have contraindications   Pneumococcal Vaccine   * Pneumococcal conjugate vaccine = PCV13 (Prevnar 13), PCV15 (Vaxneuvance), PCV20 (Prevnar 20)  * Pneumococcal polysaccharide vaccine = PPSV23 (Pneumovax) Adults 25-60 years old: 1-3 doses may be recommended based on certain risk factors  Adults 72 years old: 1-2 doses may be recommended based off what pneumonia vaccine you previously received   Hepatitis B Vaccine 3 dose series if at intermediate or high risk (ex: diabetes, end stage renal disease, liver disease)   Tetanus (Td) Vaccine - COST NOT COVERED BY MEDICARE PART B Following completion of primary series, a booster dose should be given every 10 years to maintain immunity against tetanus  Td may also be given as tetanus wound prophylaxis  Tdap Vaccine - COST NOT COVERED BY MEDICARE PART B Recommended at least once for all adults  For pregnant patients, recommended with each pregnancy  Shingles Vaccine (Shingrix) - COST NOT COVERED BY MEDICARE PART B  2 shot series recommended in those aged 48 and above     Health Maintenance Due:      Topic Date Due   • Cervical Cancer Screening  Never done   • Breast Cancer Screening: Mammogram  05/12/2022   • Colorectal Cancer Screening  09/12/2028   • HIV Screening  Completed   • Hepatitis C Screening  Completed     Immunizations Due:      Topic Date Due   • Pneumococcal Vaccine: Pediatrics (0 to 5 Years) and At-Risk Patients (6 to 59 Years) (2 - PCV) 11/23/2019   • Influenza Vaccine (1) 09/01/2022   • COVID-19 Vaccine (5 - Booster for Moderna series) 10/07/2022     Advance Directives   What are advance directives? Advance directives are legal documents that state your wishes and plans for medical care   These plans are made ahead of time in case you lose your ability to make decisions for yourself  Advance directives can apply to any medical decision, such as the treatments you want, and if you want to donate organs  What are the types of advance directives? There are many types of advance directives, and each state has rules about how to use them  You may choose a combination of any of the following:  · Living will: This is a written record of the treatment you want  You can also choose which treatments you do not want, which to limit, and which to stop at a certain time  This includes surgery, medicine, IV fluid, and tube feedings  · Durable power of  for healthcare [facility]): This is a written record that states who you want to make healthcare choices for you when you are unable to make them for yourself  This person, called a proxy, is usually a family member or a friend  You may choose more than 1 proxy  · Do not resuscitate (DNR) order:  A DNR order is used in case your heart stops beating or you stop breathing  It is a request not to have certain forms of treatment, such as CPR  A DNR order may be included in other types of advance directives  · Medical directive: This covers the care that you want if you are in a coma, near death, or unable to make decisions for yourself  You can list the treatments you want for each condition  Treatment may include pain medicine, surgery, blood transfusions, dialysis, IV or tube feedings, and a ventilator (breathing machine)  · Values history: This document has questions about your views, beliefs, and how you feel and think about life  This information can help others choose the care that you would choose  Why are advance directives important? An advance directive helps you control your care  Although spoken wishes may be used, it is better to have your wishes written down  Spoken wishes can be misunderstood, or not followed  Treatments may be given even if you do not want them   An advance directive may make it easier for your family to make difficult choices about your care  Weight Management   Why it is important to manage your weight:  Being overweight increases your risk of health conditions such as heart disease, high blood pressure, type 2 diabetes, and certain types of cancer  It can also increase your risk for osteoarthritis, sleep apnea, and other respiratory problems  Aim for a slow, steady weight loss  Even a small amount of weight loss can lower your risk of health problems  How to lose weight safely:  A safe and healthy way to lose weight is to eat fewer calories and get regular exercise  You can lose up about 1 pound a week by decreasing the number of calories you eat by 500 calories each day  Healthy meal plan for weight management:  A healthy meal plan includes a variety of foods, contains fewer calories, and helps you stay healthy  A healthy meal plan includes the following:  · Eat whole-grain foods more often  A healthy meal plan should contain fiber  Fiber is the part of grains, fruits, and vegetables that is not broken down by your body  Whole-grain foods are healthy and provide extra fiber in your diet  Some examples of whole-grain foods are whole-wheat breads and pastas, oatmeal, brown rice, and bulgur  · Eat a variety of vegetables every day  Include dark, leafy greens such as spinach, kale, saray greens, and mustard greens  Eat yellow and orange vegetables such as carrots, sweet potatoes, and winter squash  · Eat a variety of fruits every day  Choose fresh or canned fruit (canned in its own juice or light syrup) instead of juice  Fruit juice has very little or no fiber  · Eat low-fat dairy foods  Drink fat-free (skim) milk or 1% milk  Eat fat-free yogurt and low-fat cottage cheese  Try low-fat cheeses such as mozzarella and other reduced-fat cheeses  · Choose meat and other protein foods that are low in fat  Choose beans or other legumes such as split peas or lentils   Choose fish, skinless poultry (chicken or turkey), or lean cuts of red meat (beef or pork)  Before you cook meat or poultry, cut off any visible fat  · Use less fat and oil  Try baking foods instead of frying them  Add less fat, such as margarine, sour cream, regular salad dressing and mayonnaise to foods  Eat fewer high-fat foods  Some examples of high-fat foods include french fries, doughnuts, ice cream, and cakes  · Eat fewer sweets  Limit foods and drinks that are high in sugar  This includes candy, cookies, regular soda, and sweetened drinks  Exercise:  Exercise at least 30 minutes per day on most days of the week  Some examples of exercise include walking, biking, dancing, and swimming  You can also fit in more physical activity by taking the stairs instead of the elevator or parking farther away from stores  Ask your healthcare provider about the best exercise plan for you  © Copyright [employer] 2018 Information is for End User's use only and may not be sold, redistributed or otherwise used for commercial purposes   All illustrations and images included in CareNotes® are the copyrighted property of A D A M , [employer]  or [address] [place] [address]

## 2023-02-20 NOTE — ASSESSMENT & PLAN NOTE
Failed to control pain with different options: Gabapentin and Lyrica  Patient will start carbamazepine 100 mg twice a day  CBC will be requested and in 1 month      EMG is requested

## 2023-02-21 NOTE — RESULT ENCOUNTER NOTE
Dear De Flores there is persistent elevation of cholesterol  I am adding a second medication Ezetimibe to help in control of cholesterol  Rest of labs are fine

## 2023-02-28 DIAGNOSIS — R80.9 TYPE 2 DIABETES MELLITUS WITH MICROALBUMINURIA, WITHOUT LONG-TERM CURRENT USE OF INSULIN (HCC): ICD-10-CM

## 2023-02-28 DIAGNOSIS — E11.29 TYPE 2 DIABETES MELLITUS WITH MICROALBUMINURIA, WITHOUT LONG-TERM CURRENT USE OF INSULIN (HCC): ICD-10-CM

## 2023-02-28 RX ORDER — AMLODIPINE BESYLATE 5 MG/1
TABLET ORAL
Qty: 90 TABLET | Refills: 2 | Status: SHIPPED | OUTPATIENT
Start: 2023-02-28

## 2023-03-29 ENCOUNTER — OFFICE VISIT (OUTPATIENT)
Dept: NEPHROLOGY | Facility: CLINIC | Age: 63
End: 2023-03-29

## 2023-03-29 VITALS
BODY MASS INDEX: 40.5 KG/M2 | DIASTOLIC BLOOD PRESSURE: 94 MMHG | SYSTOLIC BLOOD PRESSURE: 158 MMHG | WEIGHT: 175 LBS | HEIGHT: 55 IN

## 2023-03-29 DIAGNOSIS — E11.29 TYPE 2 DIABETES MELLITUS WITH MICROALBUMINURIA, WITHOUT LONG-TERM CURRENT USE OF INSULIN (HCC): ICD-10-CM

## 2023-03-29 DIAGNOSIS — E11.43 DIABETIC GASTROPARESIS ASSOCIATED WITH TYPE 2 DIABETES MELLITUS (HCC): ICD-10-CM

## 2023-03-29 DIAGNOSIS — N18.1 CKD (CHRONIC KIDNEY DISEASE) STAGE 1, GFR 90 ML/MIN OR GREATER: ICD-10-CM

## 2023-03-29 DIAGNOSIS — R80.1 PERSISTENT PROTEINURIA: Primary | ICD-10-CM

## 2023-03-29 DIAGNOSIS — E66.01 MORBID OBESITY WITH BMI OF 45.0-49.9, ADULT (HCC): ICD-10-CM

## 2023-03-29 DIAGNOSIS — I10 ESSENTIAL HYPERTENSION: ICD-10-CM

## 2023-03-29 DIAGNOSIS — R80.9 TYPE 2 DIABETES MELLITUS WITH MICROALBUMINURIA, WITHOUT LONG-TERM CURRENT USE OF INSULIN (HCC): ICD-10-CM

## 2023-03-29 DIAGNOSIS — K31.84 DIABETIC GASTROPARESIS ASSOCIATED WITH TYPE 2 DIABETES MELLITUS (HCC): ICD-10-CM

## 2023-03-29 RX ORDER — NIFEDIPINE 30 MG/1
30 TABLET, EXTENDED RELEASE ORAL
Qty: 90 TABLET | Refills: 3 | Status: SHIPPED | OUTPATIENT
Start: 2023-03-29

## 2023-03-29 NOTE — PATIENT INSTRUCTIONS
- stop norvasc  - start procardia XL 30mg at bedtime  - start farxiga 10mg once a day  - get blood work in 2 weeks and then in 4weeks  - call with blood pressure updates in 2 weeks    - Please call me in 10 days after having your blood work done to review the results if you do not hear back from me or my office, as I may have not received the results  - please remember to perform blood work prior to the next visit  - Please call if the blood pressure top number is greater than 140 or less than 110 consistently  - Please call if you are gaining more than 2lbs in 2 days for adjustment of water pills   ~ Please AVOID the following pain medications  LIST OF NSAIDS (NONSTEROIDAL ANTI-INFLAMMATORY DRUGS) AND BURGOS-2 INHIBITORS    DIFLUNISAL (DOLOBID)  IBUPROFEN (MOTRIN, ADVIL)  FLURBIPROFEN (ANSAID)  KETOPROFEN (ORUDIS, ORUVAIL)  FENOPROFEN (NALFON)  NABUMETONE (RELAFEN)  PIROXICAM (FELDENE)  NAPROXEN (ALEVE, NAPROSYN, NAPRELAN, ANAPROX)  DICLOFENAC (VOLTAREN, CATAFLAM)  INDOMETHACIN (INDOCIN)  SULINDAC (CLINORIL)  TOLMETIN (TOLETIN)  ETODOLAC (LODINE)  MELOXICAM (MOBIC)  KETOROLAC (TORADOL)  OXAPROZIN (DAYPRO)  CELECOXIB (CELEBREX)    Things to do to reduce your blood pressure include working with all your physician to do the following:  ~ stop smoking if you smoke  ~ increase cardiovascular exercise like walking and swimming    ~ modify your diet to decrease fat and salt intake  ~ reduce your weight if you are overweight or obese   ~ increase the consumption of fruits, vegetables and whole grains  ~ decrease alcohol consumption if you consume alcohol    ~ try to minimize stress in your life with lifestyle modifications  ~ be compliant with your anti-hypertensive medications  ~ adjust your medications to help improve your vascular stiffness and decrease risks for heart attacks and strokes  Exercise to Lose Weight     Exercise and a healthy diet may help you lose weight   Your doctor may suggest specific exercises  EXERCISE IDEAS AND TIPS     Choose low-cost things you enjoy doing, such as walking, bicycling, or exercising to workout videos  Take stairs instead of the elevator  Walk during your lunch break  Park your car further away from work or school  Go to a gym or an exercise class  Start with 5 to 10 minutes of exercise each day  Build up to 30 minutes of exercise 4 to 6 days a week  Wear shoes with good support and comfortable clothes  Stretch before and after working out  Work out until you breathe harder and your heart beats faster  Drink extra water when you exercise  Do not do so much that you hurt yourself, feel dizzy, or get very short of breath  Exercises that burn about 150 calories:   Running 1 ½ miles in 15 minutes  Playing volleyball for 45 to 60 minutes  Washing and waxing a car for 45 to 60 minutes  Playing touch football for 45 minutes  Walking 1 ¾ miles in 35 minutes  Pushing a stroller 1 ½ miles in 30 minutes  Playing basketball for 30 minutes  Raking leaves for 30 minutes  Bicycling 5 miles in 30 minutes  Walking 2 miles in 30 minutes  Dancing for 30 minutes  Shoveling snow for 15 minutes  Swimming laps for 20 minutes  Walking up stairs for 15 minutes  Bicycling 4 miles in 15 minutes  Gardening for 30 to 45 minutes  Jumping rope for 15 minutes  Washing windows or floors for 45 to 60 minutes

## 2023-03-29 NOTE — PROGRESS NOTES
Nephrology Follow up Consultation  Rufino Almanzar 60 y o  female MRN: [id number]            BACKGROUND:  Rufino Almanzar is a 60 y  o female who was referred by Thaddeus Medina MD for evaluation of Follow-up and Chronic Kidney Disease    ASSESSMENT / PLAN:   60 y o   female with pmh of multiple co-morbidities including morbid obesity, hypertension (x 15yrs), diabetes (x15yrs), arthritis and hyperlipidemia presents to the office for routine follow-up  CKD stage 1A3:  - After review of records in [facility] as well as Care everywhere patient has a baseline creatinine of 0 5-0 7 mg/dL dating as far back as 2018  Most recent labs show a Creatinine of 0 64 mg/dL on 2/20/23  Renal function remains stable  - likely has underlying CKD secondary to obesity related hyper filtration plus diabetic kidney disease plus hypertensive nephrosclerosis plus age-related nephron loss plus NSAID nephropathy  -SPEP UPEP free light chain ratio from June 2022 within normal limits  - abdominal ultrasound from 09/26/2018 showing bilateral kidneys approximately 10 cm  No hydronephrosis  At this time would hold off on repeat renal ultrasound as renal parameters continue remains stable  - Acid base and lytes stable  - Clinically the patient appears to be euvolemic to minimally hypovolemic encourage patient to increase fluid intake  - Recommend to avoid use of NSAIDs, nephrotoxins  Caution advised with regards to exposure to IV contrast dye    - Discussed with the patient in depth her renal status, including the possible etiologies for CKD  - Advised the patient that when her GFR is close to 20mL/min then will start discussing about RRT(renal replacement therapy) options such as renal transplant, peritoneal dialysis and hemodialysis  - Informed the patient about the various options for Renal Replacement therapy    - Discussed with the patient how we need to work together to delay the progression of CKD with optimal BP control based on their age and co-morbidities, optimal BS control with HbA1c of <7% and trying to reduce proteinuria by the use of anti-proteinuric agents  Hypertension:  - Patient is on Norvasc 5 mg p o  q day, losartan 50 mg p o  b i d    -DC Norvasc and start Procardia XL 30 mg p o  nightly  Also starting patient on Sherald Norma advised patient to get blood work every 2 weeks x 2 and to call with blood pressure update in 2 weeks  - Goal BP of <  130/80 based on age and comorbidities  - Instructed to follow low sodium (2gm)diet   - Advised to hold ACEI/ARBs if patient suffers from dehydration due to gastrointestinal losses due to risk of HUNG secondary to failure to autoregulate  Hemoglobin:  - Goal Hb of 10-12 g/dL  - Most recent labs suggestive of 13 3 grams/deciliter  - no role for IV iron at this time    CKD-MBD(Mineral Bone Disease): - Based on patients CKD stage following is the goal of therapy  - Maintain calcium phosphorus product of < 55   - on vit D 11669 units QW    Proteinuria:  - proteinuria most likely secondary to diabetic kidney disease plus obesity related hyper filtration     - advised patient to avoid NSAIDs as could result in proteinuria as well as renal injury   -Paraproteinemia work-up negative from June 2022   - most recent micro to creatinine ratio 422 mg as of 04/13/2022   -Most recent protein creatinine ratio 990 mg as of June 2022  -Most recent UA with 0-1 RBCs positive leukocytes  Discussed findings with patient no acute indication for renal biopsy at this time unless patient with worsening significant proteinuria  Plus patient would be difficult candidate for renal biopsy based on body habitus    - currently on therapy for proteinuria with Lipitor, losartan  -  Had a detailed discussion with the patient with regards to initiation of  SGLT 2 inhibitor (dapagliflozin 10 mg p o  q day) to help with cardiovascular risk reduction, benefits with blood pressure  reduction, proteinuria reduction , volume status management as well as overall delaying CKD progression based on recent studies  All risks and benefits of the medication were discussed with the patient in depth as well as adverse effects such as risk for acute kidney injury, UTI's   Will check BMP  Every 2 weeks x2 to ensure stability of renal parameters  At this time patient's GFR is greater than 25  Patient is agreeable in terms of initiation of this medication in agrees with monitoring plan  All questions and concerns were answered  Lipids:  - on zetia  - goal LDL less than 70  - Management as per PCP    DM:  - management as per Primary team  - on Trulicity, metformin  - advised patient when and if renal parameters continue to deteriorate she may need to be taken off the metformin  - advised of tight glycemic control in order to delay CKD progression  - records reveal A1c consistently greater than 6 5% since 2018,   - most recent A1c from 02/20/23 at 6 3%, improved from prior   - no evidence of diabetic retinopathy as of 08/19/2020 diabetic exam     Nutrition/morbid obesity:  - Encouraged patient to follow a renal diet comprising of moderate potassium, low phosphorus and protein restriction to 0 8gm/kg  Advised of dietary habits and weight loss  - Will check serum albumin with next blood work  Followup:  - Patient is to follow-up in 6 months, with lab work to be performed every 2 weeks x 2 after initiation of Bladimir Luke and then again in a few days prior to the next visit  Advised patient to call me in 10 days to review the results if they do not hear back from me, as I may have not received the results  Nancy Poon MD, JAHN, 3/29/2023, 12:39 PM             SUBJECTIVE: 60 y o  female presents to the office for routine follow-up  No issues with edema  Does have chronic issues with right lower extremity drainage  Blood pressures at home slightly elevated in the high 140s  Agreeable to adjusting medications    Thankful for the care information that she is gone today no NSAID use  Agrees with usage of Farxiga  Risks and benefits discussed in depth  Advised patient if blood sugars run low to contact PCP with regards to discontinuation of metformin or decreasing Trulicity dosage  Review of Systems   Constitutional: Negative for chills and fever  HENT: Negative for congestion and sore throat  Respiratory: Negative for cough, shortness of breath and wheezing  Cardiovascular: Negative for leg swelling  Gastrointestinal: Negative for constipation, diarrhea and vomiting  Genitourinary: Negative for difficulty urinating, dysuria and hematuria  Musculoskeletal: Negative for back pain  Skin: Negative for wound  Neurological: Negative for dizziness and headaches  Psychiatric/Behavioral: Negative for agitation and confusion  All other systems reviewed and are negative        PAST MEDICAL HISTORY:  Past Medical History:   Diagnosis Date   • Back problem     herniated discs   • Chronic pain disorder     back   • Diabetes mellitus ([facility] )    • Diverticulitis    • Diverticulosis    • Hyperglycemia    • Hyperlipidemia    • Hypertension    • Impacted cerumen    • Obesity    • Palpitations        PROBLEM LIST    Patient Active Problem List   Diagnosis   • Essential hypertension   • Medicare annual wellness visit, subsequent   • Hyperlipidemia   • LFT elevation   • Chronic low back pain with right-sided sciatica   • Uncontrolled type 2 diabetes mellitus with hyperglycemia ([facility] )   • Diabetic gastroparesis associated with type 2 diabetes mellitus ([facility] )   • Acute non-recurrent frontal sinusitis   • Needs flu shot   • Anxiety   • Left foot pain   • Morbid obesity with BMI of 45 0-49 9, adult ([facility])   • Localized edema   • Thoracic spine pain   • Acute right-sided low back pain without sciatica   • Pain in right upper arm   • CKD (chronic kidney disease) stage 1, GFR 90 ml/min or greater   • Persistent proteinuria   • Depression, recurrent "(HCC)   • Type 2 diabetes mellitus with microalbuminuria, without long-term current use of insulin ([facility] )   • Neuralgia of right thigh       PAST SURGICAL HISTORY:  Past Surgical History:   Procedure Laterality Date   • CARPAL TUNNEL RELEASE Right 1983   • DIAGNOSTIC LAPAROSCOPY     • ESOPHAGOGASTRODUODENOSCOPY  10/2018    gastroduodenitis   • PERIPHERAL ANGIOGRAM     • IA COLONOSCOPY FLX DX W/COLLJ SPEC WHEN PFRMD N/A 9/12/2018    dr chowdhury, diverticulosis   • IA ESOPHAGOGASTRODUODENOSCOPY TRANSORAL DIAGNOSTIC N/A 10/10/2018    Procedure: EGD;  Surgeon: Ligia Marroquin DO;  Location: [address] [place] [address] GI LAB; Service: General       SOCIAL HISTORY :   reports that she has never smoked  She has never used smokeless tobacco  She reports that she does not drink alcohol and does not use drugs  FAMILY HISTORY:  Family History   Problem Relation Age of Onset   • Kidney disease Mother    • Hypertension Mother    • Coronary artery disease Father         premature   • Diabetes Sister    • Coronary artery disease Sister    • Coronary artery disease Maternal Grandfather    • Diabetes Paternal Grandfather    • Coronary artery disease Paternal Grandfather    • Breast cancer Paternal Aunt        ALLERGIES:  Allergies   Allergen Reactions   • Vicodin [Hydrocodone-Acetaminophen] Angioedema   • Percocet [Oxycodone-Acetaminophen] GI Intolerance   • Gabapentin GI Intolerance     vomiting   • Lyrica [Pregabalin] GI Intolerance     Stomach ache  • Aspirin GI Intolerance   • Diclofenac Sodium Rash           PHYSICAL EXAM:  Vitals:    03/29/23 1202   BP: 158/94   BP Location: Left arm   Patient Position: Sitting   Cuff Size: Adult   Weight: 79 4 kg (175 lb)   Height: 4' 3 5\" (1 308 m)     Body mass index is 46 39 kg/m²  Physical Exam  Vitals reviewed  Constitutional:       General: She is not in acute distress  Appearance: Normal appearance  She is obese  She is not ill-appearing, toxic-appearing or diaphoretic     HENT:      Head: " Normocephalic and atraumatic  Mouth/Throat:      Mouth: Mucous membranes are moist       Pharynx: Oropharynx is clear  No oropharyngeal exudate  Eyes:      General: No scleral icterus  Conjunctiva/sclera: Conjunctivae normal    Cardiovascular:      Rate and Rhythm: Normal rate  Heart sounds: Normal heart sounds  No friction rub  Pulmonary:      Effort: No respiratory distress  Breath sounds: Normal breath sounds  No stridor  Abdominal:      General: There is no distension  Palpations: Abdomen is soft  There is no mass  Tenderness: There is no abdominal tenderness  There is no right CVA tenderness or left CVA tenderness  Musculoskeletal:         General: No swelling  Cervical back: Normal range of motion  No rigidity  Skin:     General: Skin is warm  Coloration: Skin is not jaundiced  Neurological:      General: No focal deficit present  Mental Status: She is alert and oriented to person, place, and time  Mental status is at baseline     Psychiatric:         Mood and Affect: Mood normal          Behavior: Behavior normal          LABORATORY DATA:     Results from last 6 Months   Lab Units 02/20/23  0933   WBC Thousand/uL 8 49   HEMOGLOBIN g/dL 13 3   HEMATOCRIT % 41 2   PLATELETS Thousands/uL 341   POTASSIUM mmol/L 4 7   CHLORIDE mmol/L 104   CO2 mmol/L 28   BUN mg/dL 15   CREATININE mg/dL 0 64   CALCIUM mg/dL 9 5        rest all reviewed    RADIOLOGY:  No orders to display     Rest all reviewed        MEDICATIONS:    Current Outpatient Medications:   •  Accu-Chek FastClix Lancets MISC, Use in the morning, Disp: 100 each, Rfl: 5  •  dapagliflozin (Farxiga) 10 MG tablet, Take 1 tablet (10 mg total) by mouth daily, Disp: 90 tablet, Rfl: 3  •  dulaglutide (Trulicity) 3 US/1 5LA injection, Inject 0 5 mL (3 mg total) under the skin every 7 days, Disp: 2 mL, Rfl: 5  •  ergocalciferol (ERGOCALCIFEROL) 1 25 MG (29594 UT) capsule, Take 1 capsule (50,000 Units total) "by mouth once a week, Disp: 12 capsule, Rfl: 3  •  ezetimibe (ZETIA) 10 mg tablet, Take 1 tablet (10 mg total) by mouth daily, Disp: 30 tablet, Rfl: 5  •  glucose blood (FREESTYLE LITE) test strip, USE TO TEST THE BLOOD SUGAR THREE TIMES A DAY, Disp: 100 strip, Rfl: 3  •  losartan (Cozaar) 50 mg tablet, Take 1 tablet (50 mg total) by mouth 2 (two) times a day, Disp: 180 tablet, Rfl: 3  •  metFORMIN (GLUCOPHAGE) 500 mg tablet, TAKE ONE TABLET BY MOUTH TWICE A DAY WITH MEALS, Disp: 180 tablet, Rfl: 1  •  NIFEdipine (PROCARDIA XL) 30 mg 24 hr tablet, Take 1 tablet (30 mg total) by mouth daily at bedtime, Disp: 90 tablet, Rfl: 3  •  UltiCare Alcohol Swabs 70 % PADS, , Disp: , Rfl:   •  carbamazepine (CARBATROL) 100 MG 12 hr capsule, Take 1 capsule (100 mg total) by mouth 2 (two) times a day, Disp: 60 capsule, Rfl: 5          Portions of the record may have been created with voice recognition software  Occasional wrong word or \"sound a like\" substitutions may have occurred due to the inherent limitations of voice recognition software  Read the chart carefully and recognize, using context, where substitutions have occurred  If you have any questions, please contact the dictating provider        "

## 2023-04-03 ENCOUNTER — TELEPHONE (OUTPATIENT)
Dept: LAB | Facility: HOSPITAL | Age: 63
End: 2023-04-03

## 2023-04-04 DIAGNOSIS — E11.65 UNCONTROLLED TYPE 2 DIABETES MELLITUS WITH HYPERGLYCEMIA (HCC): ICD-10-CM

## 2023-04-04 RX ORDER — BLOOD-GLUCOSE METER
KIT MISCELLANEOUS
Qty: 100 STRIP | Refills: 2 | Status: SHIPPED | OUTPATIENT
Start: 2023-04-04

## 2023-04-05 LAB
ALBUMIN SERPL BCP-MCNC: 3.7 G/DL (ref 3.5–5)
ANION GAP SERPL CALCULATED.3IONS-SCNC: 4 MMOL/L (ref 4–13)
BUN SERPL-MCNC: 16 MG/DL (ref 5–25)
CALCIUM SERPL-MCNC: 9.2 MG/DL (ref 8.3–10.1)
CHLORIDE SERPL-SCNC: 107 MMOL/L (ref 96–108)
CO2 SERPL-SCNC: 26 MMOL/L (ref 21–32)
CREAT SERPL-MCNC: 0.72 MG/DL (ref 0.6–1.3)
GFR SERPL CREATININE-BSD FRML MDRD: 89 ML/MIN/1.73SQ M
GLUCOSE SERPL-MCNC: 128 MG/DL (ref 65–140)
PHOSPHATE SERPL-MCNC: 3.9 MG/DL (ref 2.3–4.1)
POTASSIUM SERPL-SCNC: 4.3 MMOL/L (ref 3.5–5.3)
SODIUM SERPL-SCNC: 137 MMOL/L (ref 135–147)

## 2023-04-07 ENCOUNTER — TELEPHONE (OUTPATIENT)
Dept: NEPHROLOGY | Facility: CLINIC | Age: 63
End: 2023-04-07

## 2023-04-07 NOTE — TELEPHONE ENCOUNTER
Patient BP is 115/77  She took nifedipine last night she want to know is she still take losartan because the bp right now is low  And she took the bp again this morning 111/72

## 2023-04-11 NOTE — TELEPHONE ENCOUNTER
Called and spoke with patient  Patient aware decrease her losartan to once a day and if she notices bp is consistently greater than 140 then do twice a day dosing of the losartan like she is ot be on

## 2023-04-21 PROBLEM — Z00.00 MEDICARE ANNUAL WELLNESS VISIT, SUBSEQUENT: Status: RESOLVED | Noted: 2018-07-20 | Resolved: 2023-04-21

## 2023-04-25 DIAGNOSIS — E11.9 TYPE 2 DIABETES MELLITUS WITHOUT COMPLICATION, UNSPECIFIED WHETHER LONG TERM INSULIN USE (HCC): ICD-10-CM

## 2023-04-25 RX ORDER — LANCETS 30 GAUGE
EACH MISCELLANEOUS
Qty: 100 EACH | Refills: 3 | Status: SHIPPED | OUTPATIENT
Start: 2023-04-25

## 2023-05-15 RX ORDER — ATORVASTATIN CALCIUM 40 MG/1
TABLET, FILM COATED ORAL
COMMUNITY
Start: 2023-04-10

## 2023-05-15 RX ORDER — TRAZODONE HYDROCHLORIDE 50 MG/1
TABLET ORAL
COMMUNITY
Start: 2023-04-10

## 2023-05-15 RX ORDER — AMLODIPINE BESYLATE 5 MG/1
TABLET ORAL
COMMUNITY
Start: 2023-04-10

## 2023-05-16 ENCOUNTER — OFFICE VISIT (OUTPATIENT)
Dept: FAMILY MEDICINE CLINIC | Facility: CLINIC | Age: 63
End: 2023-05-16

## 2023-05-16 VITALS
TEMPERATURE: 97.8 F | DIASTOLIC BLOOD PRESSURE: 80 MMHG | WEIGHT: 177 LBS | OXYGEN SATURATION: 98 % | RESPIRATION RATE: 16 BRPM | HEART RATE: 88 BPM | SYSTOLIC BLOOD PRESSURE: 144 MMHG | HEIGHT: 55 IN | BODY MASS INDEX: 40.96 KG/M2

## 2023-05-16 DIAGNOSIS — M25.561 ACUTE PAIN OF RIGHT KNEE: Primary | ICD-10-CM

## 2023-05-16 DIAGNOSIS — R80.9 TYPE 2 DIABETES MELLITUS WITH MICROALBUMINURIA, WITHOUT LONG-TERM CURRENT USE OF INSULIN (HCC): ICD-10-CM

## 2023-05-16 DIAGNOSIS — E11.29 TYPE 2 DIABETES MELLITUS WITH MICROALBUMINURIA, WITHOUT LONG-TERM CURRENT USE OF INSULIN (HCC): ICD-10-CM

## 2023-05-16 RX ORDER — TRIAMCINOLONE ACETONIDE 40 MG/ML
40 INJECTION, SUSPENSION INTRA-ARTICULAR; INTRAMUSCULAR
Status: COMPLETED | OUTPATIENT
Start: 2023-05-16 | End: 2023-05-16

## 2023-05-16 RX ADMIN — TRIAMCINOLONE ACETONIDE 40 MG: 40 INJECTION, SUSPENSION INTRA-ARTICULAR; INTRAMUSCULAR at 13:43

## 2023-05-16 NOTE — PROGRESS NOTES
Name: Bambi Wynn      : 1960      MRN: [id number]  Encounter Provider: Larissa Houston MD  Encounter Date: 2023   Encounter department: [facility]     1  Acute pain of right knee  Comments:  Right knee injected with the usual and appropirate way as in procedure note  Orders:  -     Large joint arthrocentesis: R knee    2  Type 2 diabetes mellitus with microalbuminuria, without long-term current use of insulin [facility])  Assessment & Plan:    Lab Results   Component Value Date    HGBA1C 6 3 2023   Hadley Rondon is doing better with control of hyperglycemia by the percentage HbA1C in general  She complaints with statin therapy and kidney protection therapy  The goal in treatment is a shared decition that she accepted and it is to decrease risk of vascular disease and it complications  Discussed complication from uncontrolled Diabetes and hypoglycemia symptoms  The importance of following with diabetes educator and life style modification also was stressed  Compliance with treatment encouraged  Call if side effect with the treatment  Bring blood sugars log book at the next appointment  Large joint arthrocentesis: R knee  Universal Protocol:  Consent: Verbal consent obtained    Consent given by: patient  Timeout called at: 2023 1:42 PM   Patient understanding: patient states understanding of the procedure being performed  Patient consent: the patient's understanding of the procedure matches consent given  Patient identity confirmed: verbally with patient    Supporting Documentation  Indications: pain   Procedure Details  Location: knee - R knee  Needle size: 25 G  Ultrasound guidance: no  Approach: anterior  Medications administered: 40 mg triamcinolone acetonide 40 mg/mL    Patient tolerance: patient tolerated the procedure well with no immediate complications  Dressing:  Sterile dressing applied          Subjective      Knee "pain: Pain is located in right knee  The PAIN started more 2 weeks  The injury mechanism was a twisting  during walking  The pain severity: 5  The pain has been intermittent since onset and worsening when walking and going down steps  Associated symptoms include an inability to bear weight  Denies numbness of leg  She has tried acetaminophen with mild relief  Review of Systems   Musculoskeletal: Positive for arthralgias, back pain and neck pain         Current Outpatient Medications on File Prior to Visit   Medication Sig   • amLODIPine (NORVASC) 5 mg tablet    • atorvastatin (LIPITOR) 40 mg tablet    • Comfort Touch Plus Lancets 30G MISC USE TO TEST THE BLOOD SUGAR EVERY MORNING   • dapagliflozin (Farxiga) 10 MG tablet Take 1 tablet (10 mg total) by mouth daily   • dulaglutide (Trulicity) 3 CP/9 5EX injection Inject 0 5 mL (3 mg total) under the skin every 7 days   • ergocalciferol (ERGOCALCIFEROL) 1 25 MG (94380 UT) capsule Take 1 capsule (50,000 Units total) by mouth once a week   • ezetimibe (ZETIA) 10 mg tablet Take 1 tablet (10 mg total) by mouth daily   • FREESTYLE LITE test strip USE TO TEST THE BLOOD SUGAR THREE TIMES A DAY   • losartan (Cozaar) 50 mg tablet Take 1 tablet (50 mg total) by mouth 2 (two) times a day   • metFORMIN (GLUCOPHAGE) 500 mg tablet Take 1 tablet (500 mg total) by mouth 2 (two) times a day with meals   • NIFEdipine (PROCARDIA XL) 30 mg 24 hr tablet Take 1 tablet (30 mg total) by mouth daily at bedtime   • traZODone (DESYREL) 50 mg tablet    • UltiCare Alcohol Swabs 70 % PADS    • [DISCONTINUED] carvedilol (COREG) 12 5 mg tablet Take 1 tablet (12 5 mg total) by mouth 2 (two) times a day with meals       Objective     /80 (BP Location: Left arm, Patient Position: Sitting, Cuff Size: Standard)   Pulse 88   Temp 97 8 °F (36 6 °C) (Tympanic)   Resp 16   Ht 4' 3 5\" (1 308 m)   Wt 80 3 kg (177 lb)   SpO2 98%   BMI 46 92 kg/m²     Physical Exam  Constitutional:       " Appearance: She is obese  Musculoskeletal:         General: Tenderness (of lateral and posterior area of right knee ) present         Fabian Soriano MD

## 2023-05-16 NOTE — ASSESSMENT & PLAN NOTE
Lab Results   Component Value Date    HGBA1C 6 3 02/20/2023   Leighton Fonseca is doing better with control of hyperglycemia by the percentage HbA1C in general  She complaints with statin therapy and kidney protection therapy  The goal in treatment is a shared decition that she accepted and it is to decrease risk of vascular disease and it complications  Discussed complication from uncontrolled Diabetes and hypoglycemia symptoms  The importance of following with diabetes educator and life style modification also was stressed  Compliance with treatment encouraged  Call if side effect with the treatment  Bring blood sugars log book at the next appointment

## 2023-06-07 ENCOUNTER — HOSPITAL ENCOUNTER (OUTPATIENT)
Dept: MAMMOGRAPHY | Facility: CLINIC | Age: 63
Discharge: HOME | End: 2023-06-07
Payer: MEDICARE

## 2023-06-07 VITALS — WEIGHT: 177 LBS | HEIGHT: 55 IN | BODY MASS INDEX: 40.96 KG/M2

## 2023-06-07 DIAGNOSIS — E11.29 TYPE 2 DIABETES MELLITUS WITH MICROALBUMINURIA, WITHOUT LONG-TERM CURRENT USE OF INSULIN (HCC): Primary | ICD-10-CM

## 2023-06-07 DIAGNOSIS — R80.9 TYPE 2 DIABETES MELLITUS WITH MICROALBUMINURIA, WITHOUT LONG-TERM CURRENT USE OF INSULIN (HCC): Primary | ICD-10-CM

## 2023-06-07 DIAGNOSIS — Z12.31 ENCOUNTER FOR SCREENING MAMMOGRAM FOR MALIGNANT NEOPLASM OF BREAST: ICD-10-CM

## 2023-06-07 PROCEDURE — 77063 BREAST TOMOSYNTHESIS BI: CPT

## 2023-06-07 PROCEDURE — 77067 SCR MAMMO BI INCL CAD: CPT

## 2023-06-07 RX ORDER — ALCOHOL ANTISEPTIC PADS
PADS, MEDICATED (EA) TOPICAL
Qty: 100 EACH | Refills: 3 | Status: SHIPPED | OUTPATIENT
Start: 2023-06-07

## 2023-06-09 ENCOUNTER — TELEPHONE (OUTPATIENT)
Dept: FAMILY MEDICINE CLINIC | Facility: CLINIC | Age: 63
End: 2023-06-09

## 2023-06-09 NOTE — TELEPHONE ENCOUNTER
Patient call she states she would to know the results in her mammo she is seen it in her chart please advised thank you

## 2023-06-27 DIAGNOSIS — R80.9 TYPE 2 DIABETES MELLITUS WITH MICROALBUMINURIA, WITHOUT LONG-TERM CURRENT USE OF INSULIN (HCC): ICD-10-CM

## 2023-06-27 DIAGNOSIS — E11.29 TYPE 2 DIABETES MELLITUS WITH MICROALBUMINURIA, WITHOUT LONG-TERM CURRENT USE OF INSULIN (HCC): ICD-10-CM

## 2023-06-27 RX ORDER — DULAGLUTIDE 3 MG/.5ML
INJECTION, SOLUTION SUBCUTANEOUS
Qty: 2 ML | Refills: 4 | Status: SHIPPED | OUTPATIENT
Start: 2023-06-27 | End: 2023-07-05 | Stop reason: SDUPTHER

## 2023-07-01 DIAGNOSIS — E11.65 UNCONTROLLED TYPE 2 DIABETES MELLITUS WITH HYPERGLYCEMIA (HCC): ICD-10-CM

## 2023-07-02 RX ORDER — BLOOD-GLUCOSE METER
KIT MISCELLANEOUS
Qty: 100 STRIP | Refills: 1 | Status: SHIPPED | OUTPATIENT
Start: 2023-07-02

## 2023-07-03 DIAGNOSIS — E55.9 VITAMIN D DEFICIENCY: ICD-10-CM

## 2023-07-03 DIAGNOSIS — R80.1 PERSISTENT PROTEINURIA: ICD-10-CM

## 2023-07-05 DIAGNOSIS — I10 ESSENTIAL HYPERTENSION: Primary | ICD-10-CM

## 2023-07-05 DIAGNOSIS — E11.29 TYPE 2 DIABETES MELLITUS WITH MICROALBUMINURIA, WITHOUT LONG-TERM CURRENT USE OF INSULIN (HCC): ICD-10-CM

## 2023-07-05 DIAGNOSIS — R80.9 TYPE 2 DIABETES MELLITUS WITH MICROALBUMINURIA, WITHOUT LONG-TERM CURRENT USE OF INSULIN (HCC): ICD-10-CM

## 2023-07-05 RX ORDER — ATORVASTATIN CALCIUM 40 MG/1
40 TABLET, FILM COATED ORAL DAILY
Qty: 90 TABLET | Refills: 3 | Status: SHIPPED | OUTPATIENT
Start: 2023-07-05

## 2023-07-05 RX ORDER — AMLODIPINE BESYLATE 5 MG/1
5 TABLET ORAL DAILY
Qty: 90 TABLET | Refills: 3 | Status: SHIPPED | OUTPATIENT
Start: 2023-07-05 | End: 2023-07-05 | Stop reason: ALTCHOICE

## 2023-07-05 RX ORDER — ERGOCALCIFEROL 1.25 MG/1
CAPSULE ORAL
Qty: 12 CAPSULE | Refills: 2 | Status: SHIPPED | OUTPATIENT
Start: 2023-07-05

## 2023-07-05 RX ORDER — DULAGLUTIDE 3 MG/.5ML
3 INJECTION, SOLUTION SUBCUTANEOUS
Qty: 2 ML | Refills: 3 | Status: SHIPPED | OUTPATIENT
Start: 2023-07-05

## 2023-07-28 DIAGNOSIS — E78.2 MIXED HYPERLIPIDEMIA: ICD-10-CM

## 2023-07-28 RX ORDER — EZETIMIBE 10 MG/1
10 TABLET ORAL DAILY
Qty: 30 TABLET | Refills: 4 | Status: SHIPPED | OUTPATIENT
Start: 2023-07-28

## 2023-07-28 RX ORDER — EZETIMIBE 10 MG/1
10 TABLET ORAL DAILY
Qty: 30 TABLET | Refills: 0 | OUTPATIENT
Start: 2023-07-28

## 2023-08-01 DIAGNOSIS — E11.9 TYPE 2 DIABETES MELLITUS WITHOUT COMPLICATION, UNSPECIFIED WHETHER LONG TERM INSULIN USE (HCC): ICD-10-CM

## 2023-08-01 DIAGNOSIS — E78.2 MIXED HYPERLIPIDEMIA: ICD-10-CM

## 2023-08-01 RX ORDER — LANCETS 30 GAUGE
EACH MISCELLANEOUS
Qty: 100 EACH | Refills: 2 | Status: SHIPPED | OUTPATIENT
Start: 2023-08-01

## 2023-08-01 RX ORDER — EZETIMIBE 10 MG/1
10 TABLET ORAL DAILY
Qty: 30 TABLET | Refills: 0 | OUTPATIENT
Start: 2023-08-01

## 2023-08-16 DIAGNOSIS — E11.65 UNCONTROLLED TYPE 2 DIABETES MELLITUS WITH HYPERGLYCEMIA (HCC): ICD-10-CM

## 2023-08-17 RX ORDER — BLOOD-GLUCOSE METER
KIT MISCELLANEOUS
Qty: 100 STRIP | Refills: 0 | Status: SHIPPED | OUTPATIENT
Start: 2023-08-17

## 2023-08-29 ENCOUNTER — TELEPHONE (OUTPATIENT)
Dept: LAB | Facility: HOSPITAL | Age: 63
End: 2023-08-29

## 2023-08-29 DIAGNOSIS — E11.29 TYPE 2 DIABETES MELLITUS WITH MICROALBUMINURIA, WITHOUT LONG-TERM CURRENT USE OF INSULIN (HCC): ICD-10-CM

## 2023-08-29 DIAGNOSIS — R80.9 TYPE 2 DIABETES MELLITUS WITH MICROALBUMINURIA, WITHOUT LONG-TERM CURRENT USE OF INSULIN (HCC): ICD-10-CM

## 2023-08-29 RX ORDER — ATORVASTATIN CALCIUM 40 MG/1
40 TABLET, FILM COATED ORAL DAILY
Qty: 90 TABLET | Refills: 2 | Status: SHIPPED | OUTPATIENT
Start: 2023-08-29

## 2023-09-06 ENCOUNTER — LAB (OUTPATIENT)
Dept: LAB | Facility: HOSPITAL | Age: 63
End: 2023-09-06
Attending: INTERNAL MEDICINE
Payer: MEDICARE

## 2023-09-06 DIAGNOSIS — K31.84 DIABETIC GASTROPARESIS ASSOCIATED WITH TYPE 2 DIABETES MELLITUS: ICD-10-CM

## 2023-09-06 DIAGNOSIS — R80.9 TYPE 2 DIABETES MELLITUS WITH MICROALBUMINURIA, WITHOUT LONG-TERM CURRENT USE OF INSULIN: ICD-10-CM

## 2023-09-06 DIAGNOSIS — I10 ESSENTIAL HYPERTENSION: ICD-10-CM

## 2023-09-06 DIAGNOSIS — E66.01 MORBID OBESITY WITH BMI OF 45.0-49.9, ADULT (HCC): ICD-10-CM

## 2023-09-06 DIAGNOSIS — E11.43 DIABETIC GASTROPARESIS ASSOCIATED WITH TYPE 2 DIABETES MELLITUS: ICD-10-CM

## 2023-09-06 DIAGNOSIS — R80.1 PERSISTENT PROTEINURIA: ICD-10-CM

## 2023-09-06 DIAGNOSIS — E11.29 TYPE 2 DIABETES MELLITUS WITH MICROALBUMINURIA, WITHOUT LONG-TERM CURRENT USE OF INSULIN: ICD-10-CM

## 2023-09-06 DIAGNOSIS — N18.1 CKD (CHRONIC KIDNEY DISEASE) STAGE 1, GFR 90 ML/MIN OR GREATER: ICD-10-CM

## 2023-09-06 LAB
25(OH)D3 SERPL-MCNC: 32.2 NG/ML (ref 30–100)
ALBUMIN SERPL BCP-MCNC: 4.3 G/DL (ref 3.5–5)
ANION GAP SERPL CALCULATED.3IONS-SCNC: 11 MMOL/L
BUN SERPL-MCNC: 14 MG/DL (ref 5–25)
CALCIUM SERPL-MCNC: 9.2 MG/DL (ref 8.4–10.2)
CHLORIDE SERPL-SCNC: 104 MMOL/L (ref 96–108)
CO2 SERPL-SCNC: 24 MMOL/L (ref 21–32)
CREAT SERPL-MCNC: 0.66 MG/DL (ref 0.6–1.3)
CREAT UR-MCNC: 68.9 MG/DL
GFR SERPL CREATININE-BSD FRML MDRD: 93 ML/MIN/1.73SQ M
GLUCOSE SERPL-MCNC: 77 MG/DL (ref 65–140)
MICROALBUMIN UR-MCNC: <7 MG/L
MICROALBUMIN/CREAT 24H UR: <10 MG/G CREATININE (ref 0–30)
PHOSPHATE SERPL-MCNC: 3.5 MG/DL (ref 2.3–4.1)
POTASSIUM SERPL-SCNC: 4 MMOL/L (ref 3.5–5.3)
SODIUM SERPL-SCNC: 139 MMOL/L (ref 135–147)

## 2023-09-06 PROCEDURE — 82570 ASSAY OF URINE CREATININE: CPT

## 2023-09-06 PROCEDURE — 82043 UR ALBUMIN QUANTITATIVE: CPT

## 2023-09-06 PROCEDURE — 82306 VITAMIN D 25 HYDROXY: CPT

## 2023-09-06 PROCEDURE — 80069 RENAL FUNCTION PANEL: CPT

## 2023-09-06 PROCEDURE — 36415 COLL VENOUS BLD VENIPUNCTURE: CPT

## 2023-09-07 ENCOUNTER — TELEPHONE (OUTPATIENT)
Dept: NEPHROLOGY | Facility: CLINIC | Age: 63
End: 2023-09-07

## 2023-09-07 NOTE — TELEPHONE ENCOUNTER
----- Message from JESSENIA CALLAHAN MD sent at 9/7/2023 11:03 AM EDT -----  Please let the patient know that most recent lab work in terms of renal parameters are stable. Will discuss further at the upcoming visit, let me know if they have any questions or concerns.     Thanks

## 2023-09-12 DIAGNOSIS — E11.65 UNCONTROLLED TYPE 2 DIABETES MELLITUS WITH HYPERGLYCEMIA (HCC): ICD-10-CM

## 2023-09-12 RX ORDER — BLOOD-GLUCOSE METER
KIT MISCELLANEOUS
Qty: 100 STRIP | Refills: 0 | Status: SHIPPED | OUTPATIENT
Start: 2023-09-12

## 2023-09-13 ENCOUNTER — OFFICE VISIT (OUTPATIENT)
Dept: FAMILY MEDICINE CLINIC | Facility: CLINIC | Age: 63
End: 2023-09-13
Payer: MEDICARE

## 2023-09-13 VITALS
HEART RATE: 92 BPM | TEMPERATURE: 98 F | HEIGHT: 55 IN | DIASTOLIC BLOOD PRESSURE: 70 MMHG | OXYGEN SATURATION: 98 % | BODY MASS INDEX: 41.42 KG/M2 | WEIGHT: 179 LBS | RESPIRATION RATE: 16 BRPM | SYSTOLIC BLOOD PRESSURE: 120 MMHG

## 2023-09-13 DIAGNOSIS — E66.01 MORBID OBESITY WITH BMI OF 45.0-49.9, ADULT (HCC): ICD-10-CM

## 2023-09-13 DIAGNOSIS — Z12.4 CERVICAL CANCER SCREENING: ICD-10-CM

## 2023-09-13 DIAGNOSIS — E11.65 UNCONTROLLED TYPE 2 DIABETES MELLITUS WITH HYPERGLYCEMIA (HCC): Primary | ICD-10-CM

## 2023-09-13 DIAGNOSIS — G47.33 OSA (OBSTRUCTIVE SLEEP APNEA): ICD-10-CM

## 2023-09-13 LAB
LEFT EYE DIABETIC RETINOPATHY: ABNORMAL
LEFT EYE IMAGE QUALITY: ABNORMAL
LEFT EYE MACULAR EDEMA: ABNORMAL
LEFT EYE OTHER RETINOPATHY: ABNORMAL
RIGHT EYE DIABETIC RETINOPATHY: ABNORMAL
RIGHT EYE IMAGE QUALITY: ABNORMAL
RIGHT EYE MACULAR EDEMA: ABNORMAL
RIGHT EYE OTHER RETINOPATHY: ABNORMAL
SEVERITY (EYE EXAM): ABNORMAL
SL AMB POCT HEMOGLOBIN AIC: 6.4 (ref ?–6.5)

## 2023-09-13 PROCEDURE — 83036 HEMOGLOBIN GLYCOSYLATED A1C: CPT | Performed by: FAMILY MEDICINE

## 2023-09-13 PROCEDURE — 99214 OFFICE O/P EST MOD 30 MIN: CPT | Performed by: FAMILY MEDICINE

## 2023-09-13 RX ORDER — AMLODIPINE BESYLATE 5 MG/1
TABLET ORAL
COMMUNITY
Start: 2023-08-24 | End: 2023-09-13 | Stop reason: ALTCHOICE

## 2023-09-13 RX ORDER — TRAZODONE HYDROCHLORIDE 50 MG/1
TABLET ORAL
COMMUNITY
Start: 2023-08-24 | End: 2023-09-13 | Stop reason: ALTCHOICE

## 2023-09-13 NOTE — PROGRESS NOTES
Name: Frantz Miller      : 1960      MRN: [id number]  Encounter Provider: Ena Tracy MD  Encounter Date: 2023   Encounter department: [address] [place] [address] [facility]    Assessment & Plan   Assessment & Plan:   The patient's blood sugar and blood pressure are well-controlled. She is scheduled for an EMG in October due to her chronic thigh pain after a fall. She reports difficulty walking due to pain and numbness in her leg, which may be due to nerve damage. A home sleep study will be ordered to assess her possible  sleep apnea. She will continue her current medications for blood pressure and cholesterol. She will continue to monitor her blood sugar levels and follow up in four months for diabetes management. She will also be referred to a gynecologist for a routine check-up. She will be given a prescription for a stool softner to manage her occasional constipation. 1. Uncontrolled type 2 diabetes mellitus with hyperglycemia (HCC)  -     POCT hemoglobin A1c  -     IRIS Diabetic eye exam    2. Morbid obesity with BMI of 45.0-49.9, adult ([facility])    3. Cervical cancer screening  -     Ambulatory Referral to Gynecology; Future    4. MAGGIE (obstructive sleep apnea)  Comments:  suspected  Orders:  -     Home Study; Future           Subjective      Subjective:   58year-old patient Frantz Miller presents for a follow-up visit. She reports persistent pain in the area of a previous fall, which is somewhat alleviated by the application of ice. She mentions an upcoming electrocardiogram in October. She also reports recent issues with sleep apnea, which she has been managing with a CPAP mask. She has noticed that her snoring has decreased when she uses a pillow and she wakes up to urinate, possibly due to diabetes. She reports waking up two times a night, more if she has consumed sweets.  She also reports a sore throat that has been bothering her for several months, occasional headaches, and no fever. She reports stomach pain, possibly related to gastritis. She denies any difficulty swallowing but sometimes feels like food is stuck in her throat. She denies any swelling in her feet. She reports a neck circumference of 16 inches, indicating a high risk of sleep apnea. She also reports recent issues with her back and arm pain. .        Review of Systems  The patient's blood sugar level is 6.4%, which is excellent. She is currently managing her blood pressure and cholesterol with medication. She reports that she has been walking without a cane for two weeks following an injection.  She reports that she has been sleeping well and denies any current use of sleep aids  Current Outpatient Medications on File Prior to Visit   Medication Sig   • atorvastatin (LIPITOR) 40 mg tablet TAKE ONE TABLET BY MOUTH ONCE DAILY   • Comfort Touch Plus Lancets 30G MISC USE TO TEST THE BLOOD SUGAR EVERY MORNING   • UltiCare Alcohol Swabs 70 % PADS USE TO CLEAN THE AREAS BEFORE TESTING BLOOD SUGAR OR/AND INJECTING INSULIN AS DIRECTED   • dulaglutide (Trulicity) 3 EJ/2.1JH injection Inject 0.5 mL (3 mg total) under the skin every 7 days   • ergocalciferol (VITAMIN D2) 50,000 units TAKE ONE CAPSULE BY MOUTH ONCE EVERY WEEK   • ezetimibe (ZETIA) 10 mg tablet TAKE ONE TABLET BY MOUTH ONCE DAILY   • FREESTYLE LITE test strip USE TO TEST THE BLOOD SUGAR THREE TIMES A DAY   • losartan (Cozaar) 50 mg tablet Take 1 tablet (50 mg total) by mouth 2 (two) times a day   • metFORMIN (GLUCOPHAGE) 500 mg tablet Take 1 tablet (500 mg total) by mouth 2 (two) times a day with meals   • NIFEdipine (PROCARDIA XL) 30 mg 24 hr tablet Take 1 tablet (30 mg total) by mouth daily at bedtime   • [DISCONTINUED] amLODIPine (NORVASC) 5 mg tablet    • [DISCONTINUED] carbamazepine (CARBATROL) 100 MG 12 hr capsule    • [DISCONTINUED] carvedilol (COREG) 12.5 mg tablet Take 1 tablet (12.5 mg total) by mouth 2 (two) times a day with meals   • [DISCONTINUED] dapagliflozin (Farxiga) 10 MG tablet Take 1 tablet (10 mg total) by mouth daily   • [DISCONTINUED] traZODone (DESYREL) 50 mg tablet        Objective     /70 (BP Location: Left arm, Patient Position: Sitting, Cuff Size: Standard)   Pulse 92   Temp 98 °F (36.7 °C) (Tympanic)   Resp 16   Ht 4' 3.5" (1.308 m)   Wt 81.2 kg (179 lb)   SpO2 98%   BMI 47.45 kg/m²       Physical Exam  Constitutional:       Appearance: Normal appearance. She is obese. Cardiovascular:      Rate and Rhythm: Normal rate and regular rhythm. Pulses: Normal pulses. no weak pulses          Dorsalis pedis pulses are 2+ on the right side and 2+ on the left side. Posterior tibial pulses are 2+ on the right side and 2+ on the left side. Heart sounds: Normal heart sounds. Pulmonary:      Effort: Pulmonary effort is normal.      Breath sounds: Normal breath sounds. Feet:      Right foot:      Skin integrity: Callus and dry skin present. No ulcer, skin breakdown, erythema or warmth. Left foot:      Skin integrity: Callus and dry skin present. No ulcer, skin breakdown, erythema or warmth. Neurological:      Mental Status: She is alert. Patient's shoes and socks removed. Right Foot/Ankle   Right Foot Inspection  Skin Exam: skin normal, skin intact, dry skin, callus and callus. No warmth, no erythema, no maceration, no abnormal color, no pre-ulcer and no ulcer. Toe Exam: ROM and strength within normal limits. No swelling, no tenderness, erythema and  no right toe deformity    Sensory   Vibration: intact  Proprioception: intact  Monofilament testing: intact    Vascular  Capillary refills: < 3 seconds  The right DP pulse is 2+. The right PT pulse is 2+. Left Foot/Ankle  Left Foot Inspection  Skin Exam: skin normal, skin intact, dry skin and callus. No warmth, no erythema, no maceration, normal color, no pre-ulcer and no ulcer. Toe Exam: ROM and strength within normal limits.  No swelling, no tenderness, no erythema and no left toe deformity. Sensory   Vibration: intact  Proprioception: intact  Monofilament testing: intact    Vascular  Capillary refills: < 3 seconds  The left DP pulse is 2+. The left PT pulse is 2+. Assign Risk Category  No deformity present  No loss of protective sensation  No weak pulses  Risk: 0    Agnieszka Gonsales MD  BMI Counseling: Body mass index is 47.45 kg/m². The BMI is above normal. Nutrition recommendations include reducing portion sizes. Exercise recommendations include exercising 3-5 times per week.

## 2023-09-18 DIAGNOSIS — R80.9 TYPE 2 DIABETES MELLITUS WITH MICROALBUMINURIA, WITHOUT LONG-TERM CURRENT USE OF INSULIN: ICD-10-CM

## 2023-09-18 DIAGNOSIS — E11.29 TYPE 2 DIABETES MELLITUS WITH MICROALBUMINURIA, WITHOUT LONG-TERM CURRENT USE OF INSULIN: ICD-10-CM

## 2023-09-18 RX ORDER — ALCOHOL ANTISEPTIC PADS
PADS, MEDICATED (EA) TOPICAL
Qty: 100 EACH | Refills: 2 | Status: SHIPPED | OUTPATIENT
Start: 2023-09-18

## 2023-09-27 DIAGNOSIS — E11.65 UNCONTROLLED TYPE 2 DIABETES MELLITUS WITH HYPERGLYCEMIA (HCC): ICD-10-CM

## 2023-09-27 RX ORDER — BLOOD-GLUCOSE METER
KIT MISCELLANEOUS
Qty: 100 STRIP | Refills: 0 | Status: SHIPPED | OUTPATIENT
Start: 2023-09-27

## 2023-10-17 ENCOUNTER — TELEPHONE (OUTPATIENT)
Dept: SLEEP CENTER | Facility: CLINIC | Age: 63
End: 2023-10-17

## 2023-10-17 NOTE — TELEPHONE ENCOUNTER
----- Message from Forrest Mckeon MD sent at 10/16/2023 10:21 PM EDT -----  approved  ----- Message -----  From: Tono Tomlinson  Sent: 10/16/2023  11:05 AM EDT  To: Sleep Medicine [facility] Provider    This home sleep study needs approval.     If approved please sign and return to clerical pool. If denied please include reasons why. Also provide alternative testing if warranted. Please sign and return to clerical pool.

## 2023-10-24 ENCOUNTER — HOSPITAL ENCOUNTER (OUTPATIENT)
Dept: MAMMOGRAPHY | Facility: CLINIC | Age: 63
Discharge: HOME | End: 2023-10-24
Payer: MEDICARE

## 2023-10-24 VITALS — HEIGHT: 55 IN | WEIGHT: 179 LBS | BODY MASS INDEX: 41.42 KG/M2

## 2023-10-24 DIAGNOSIS — E11.9 TYPE 2 DIABETES MELLITUS WITHOUT COMPLICATION, UNSPECIFIED WHETHER LONG TERM INSULIN USE (HCC): ICD-10-CM

## 2023-10-24 DIAGNOSIS — R92.8 ABNORMAL SCREENING MAMMOGRAM: ICD-10-CM

## 2023-10-24 PROCEDURE — 77065 DX MAMMO INCL CAD UNI: CPT

## 2023-10-24 RX ORDER — LANCETS 30 GAUGE
EACH MISCELLANEOUS
Qty: 100 EACH | Refills: 3 | Status: SHIPPED | OUTPATIENT
Start: 2023-10-24

## 2023-10-24 NOTE — PROGRESS NOTES
Met with patient and Dr. Angie Hurt regarding recommendation for;      _____ RIGHT ___x___LEFT      _____Ultrasound guided  ____x__Stereotactic  Breast biopsy. __x___Verbalized understanding.       Blood thinners:  _____yes ___x__no    Date stopped: ___________    Biopsy teaching sheet given and reviewed with patient verbalizing understanding and questions/concerns addressed at this time:  ____x___yes ______no

## 2023-10-31 DIAGNOSIS — E11.65 UNCONTROLLED TYPE 2 DIABETES MELLITUS WITH HYPERGLYCEMIA (HCC): ICD-10-CM

## 2023-10-31 RX ORDER — BLOOD-GLUCOSE METER
KIT MISCELLANEOUS
Qty: 100 STRIP | Refills: 0 | Status: SHIPPED | OUTPATIENT
Start: 2023-10-31

## 2023-11-06 DIAGNOSIS — F32.9 REACTIVE DEPRESSION: ICD-10-CM

## 2023-11-06 RX ORDER — TRAZODONE HYDROCHLORIDE 50 MG/1
TABLET ORAL
Qty: 90 TABLET | Refills: 2 | Status: SHIPPED | OUTPATIENT
Start: 2023-11-06

## 2023-11-06 NOTE — TELEPHONE ENCOUNTER
Requested medication(s) are due for refill today: No  Patient has already received a courtesy refill: No  Other reason request has been forwarded to provider: med discontinued? ?

## 2023-11-08 ENCOUNTER — HOSPITAL ENCOUNTER (OUTPATIENT)
Dept: MAMMOGRAPHY | Facility: CLINIC | Age: 63
Discharge: HOME | End: 2023-11-08
Payer: MEDICARE

## 2023-11-08 VITALS — SYSTOLIC BLOOD PRESSURE: 126 MMHG | HEART RATE: 96 BPM | DIASTOLIC BLOOD PRESSURE: 83 MMHG

## 2023-11-08 DIAGNOSIS — R92.8 ABNORMAL MAMMOGRAM: ICD-10-CM

## 2023-11-08 PROCEDURE — A4648 IMPLANTABLE TISSUE MARKER: HCPCS

## 2023-11-08 PROCEDURE — 19499 UNLISTED PROCEDURE BREAST: CPT

## 2023-11-08 PROCEDURE — 19081 BX BREAST 1ST LESION STRTCTC: CPT

## 2023-11-08 PROCEDURE — 88341 IMHCHEM/IMCYTCHM EA ADD ANTB: CPT | Performed by: PATHOLOGY

## 2023-11-08 PROCEDURE — 88305 TISSUE EXAM BY PATHOLOGIST: CPT | Performed by: PATHOLOGY

## 2023-11-08 PROCEDURE — 88342 IMHCHEM/IMCYTCHM 1ST ANTB: CPT | Performed by: PATHOLOGY

## 2023-11-08 RX ORDER — LIDOCAINE HYDROCHLORIDE AND EPINEPHRINE BITARTRATE 20; .01 MG/ML; MG/ML
10 INJECTION, SOLUTION SUBCUTANEOUS ONCE
Status: COMPLETED | OUTPATIENT
Start: 2023-11-08 | End: 2023-11-08

## 2023-11-08 RX ORDER — LIDOCAINE HYDROCHLORIDE 10 MG/ML
5 INJECTION, SOLUTION EPIDURAL; INFILTRATION; INTRACAUDAL; PERINEURAL ONCE
Status: COMPLETED | OUTPATIENT
Start: 2023-11-08 | End: 2023-11-08

## 2023-11-08 RX ADMIN — LIDOCAINE HYDROCHLORIDE 5 ML: 10 INJECTION, SOLUTION EPIDURAL; INFILTRATION; INTRACAUDAL; PERINEURAL at 13:00

## 2023-11-08 RX ADMIN — LIDOCAINE HYDROCHLORIDE AND EPINEPHRINE 10 ML: 20; 10 INJECTION, SOLUTION INFILTRATION; PERINEURAL at 13:00

## 2023-11-08 NOTE — PROGRESS NOTES
Ice pack given:    ___x__yes _____no        Discharge instructions given to patient:    __x___yes _____no    Discharged via:    ___x__ambulatory    _____wheelchair    _____stretcher    Stable on discharge:    __x___yes ____no    Band aid clean,dry and intact.

## 2023-11-08 NOTE — PROGRESS NOTES
Procedure type:    _____ultrasound guided ___x__stereotactic    Breast:    ___x__Left _____Right    Location:Left breast 900    Needle: 8G    # of passes: 4 (1 with calcs and 3 without calcs)    Clip: [place] Tie    Performed by: Dr. Pierre Lin held for 5 minutes by: Stevo Duran RN    Steri Strips:    ___x__yes _____no    Band aid:    ___x__yes_____no    Tape and guaze:    _____yes _x____no    Tolerated procedure:    ___x__yes _____no

## 2023-11-10 ENCOUNTER — TELEPHONE (OUTPATIENT)
Dept: MAMMOGRAPHY | Facility: CLINIC | Age: 63
End: 2023-11-10

## 2023-11-10 PROCEDURE — 88341 IMHCHEM/IMCYTCHM EA ADD ANTB: CPT | Performed by: PATHOLOGY

## 2023-11-10 PROCEDURE — 88305 TISSUE EXAM BY PATHOLOGIST: CPT | Performed by: PATHOLOGY

## 2023-11-10 PROCEDURE — 88342 IMHCHEM/IMCYTCHM 1ST ANTB: CPT | Performed by: PATHOLOGY

## 2023-11-13 ENCOUNTER — IMMUNIZATIONS (OUTPATIENT)
Dept: FAMILY MEDICINE CLINIC | Facility: CLINIC | Age: 63
End: 2023-11-13
Payer: MEDICARE

## 2023-11-13 DIAGNOSIS — Z23 ENCOUNTER FOR IMMUNIZATION: Primary | ICD-10-CM

## 2023-11-13 PROCEDURE — 90686 IIV4 VACC NO PRSV 0.5 ML IM: CPT | Performed by: FAMILY MEDICINE

## 2023-11-13 PROCEDURE — G0008 ADMIN INFLUENZA VIRUS VAC: HCPCS | Performed by: FAMILY MEDICINE

## 2023-11-13 RX ORDER — DAPAGLIFLOZIN 10 MG/1
TABLET, FILM COATED ORAL
COMMUNITY
Start: 2023-10-30

## 2023-11-14 DIAGNOSIS — E78.2 MIXED HYPERLIPIDEMIA: ICD-10-CM

## 2023-11-14 RX ORDER — EZETIMIBE 10 MG/1
10 TABLET ORAL DAILY
Qty: 30 TABLET | Refills: 3 | Status: SHIPPED | OUTPATIENT
Start: 2023-11-14

## 2023-11-16 ENCOUNTER — HOSPITAL ENCOUNTER (OUTPATIENT)
Dept: SLEEP CENTER | Facility: CLINIC | Age: 63
Discharge: HOME | End: 2023-11-16
Payer: MEDICARE

## 2023-11-16 DIAGNOSIS — G47.33 OSA (OBSTRUCTIVE SLEEP APNEA): ICD-10-CM

## 2023-11-16 PROCEDURE — G0399 HOME SLEEP TEST/TYPE 3 PORTA: HCPCS

## 2023-11-16 NOTE — PROGRESS NOTES
Home Sleep Study Documentation    HOME STUDY DEVICE: Noxturnal no                                           Kellee G3 yes      Pre-Sleep Home Study:    Set-up and instructions performed by: Melva Mims performed demonstration for Patient: yes    Return demonstration performed by Patient: yes    Written instructions provided to Patient: yes    Patient signed consent form: yes        Post-Sleep Home Study:    Additional comments by Patient: none    Home Sleep Study Failed:no:    Failure reason: N/A    Reported or Detected: N/A    Scored by: Tan Castillo

## 2023-11-17 PROBLEM — G47.33 OSA (OBSTRUCTIVE SLEEP APNEA): Status: ACTIVE | Noted: 2023-11-17

## 2023-11-28 DIAGNOSIS — E11.65 UNCONTROLLED TYPE 2 DIABETES MELLITUS WITH HYPERGLYCEMIA (HCC): ICD-10-CM

## 2023-11-28 RX ORDER — BLOOD-GLUCOSE METER
KIT MISCELLANEOUS
Qty: 100 STRIP | Refills: 0 | Status: SHIPPED | OUTPATIENT
Start: 2023-11-28

## 2023-12-04 DIAGNOSIS — I10 HYPERTENSION, UNSPECIFIED TYPE: ICD-10-CM

## 2023-12-04 RX ORDER — LOSARTAN POTASSIUM 50 MG/1
50 TABLET ORAL 2 TIMES DAILY
Qty: 180 TABLET | Refills: 2 | Status: SHIPPED | OUTPATIENT
Start: 2023-12-04

## 2023-12-12 ENCOUNTER — TELEPHONE (OUTPATIENT)
Dept: SLEEP CENTER | Facility: CLINIC | Age: 63
End: 2023-12-12

## 2023-12-12 NOTE — TELEPHONE ENCOUNTER
Called patient and advised sleep study resulted and does not show sleep apnea. Patient plans to follow up with ordering provider Dr. Kendra Lynn. She has an appointment coming up soon.

## 2023-12-21 DIAGNOSIS — R80.9 TYPE 2 DIABETES MELLITUS WITH MICROALBUMINURIA, WITHOUT LONG-TERM CURRENT USE OF INSULIN: ICD-10-CM

## 2023-12-21 DIAGNOSIS — E11.29 TYPE 2 DIABETES MELLITUS WITH MICROALBUMINURIA, WITHOUT LONG-TERM CURRENT USE OF INSULIN: ICD-10-CM

## 2023-12-21 RX ORDER — ALCOHOL ANTISEPTIC PADS
PADS, MEDICATED (EA) TOPICAL
Qty: 100 EACH | Refills: 3 | Status: SHIPPED | OUTPATIENT
Start: 2023-12-21

## 2023-12-26 DIAGNOSIS — E11.65 UNCONTROLLED TYPE 2 DIABETES MELLITUS WITH HYPERGLYCEMIA (HCC): ICD-10-CM

## 2023-12-27 RX ORDER — BLOOD-GLUCOSE METER
KIT MISCELLANEOUS
Qty: 100 STRIP | Refills: 0 | Status: SHIPPED | OUTPATIENT
Start: 2023-12-27

## 2024-01-04 ENCOUNTER — TELEPHONE (OUTPATIENT)
Dept: LAB | Facility: HOSPITAL | Age: 64
End: 2024-01-04

## 2024-01-04 DIAGNOSIS — N18.1 CKD (CHRONIC KIDNEY DISEASE) STAGE 1, GFR 90 ML/MIN OR GREATER: ICD-10-CM

## 2024-01-04 DIAGNOSIS — R80.1 PERSISTENT PROTEINURIA: ICD-10-CM

## 2024-01-04 DIAGNOSIS — E55.9 VITAMIN D DEFICIENCY: Primary | ICD-10-CM

## 2024-01-08 ENCOUNTER — APPOINTMENT (OUTPATIENT)
Dept: LAB | Facility: HOSPITAL | Age: 64
End: 2024-01-08
Attending: INTERNAL MEDICINE
Payer: MEDICARE

## 2024-01-08 DIAGNOSIS — N18.1 CKD (CHRONIC KIDNEY DISEASE) STAGE 1, GFR 90 ML/MIN OR GREATER: ICD-10-CM

## 2024-01-08 DIAGNOSIS — E55.9 VITAMIN D DEFICIENCY: ICD-10-CM

## 2024-01-08 DIAGNOSIS — R80.1 PERSISTENT PROTEINURIA: ICD-10-CM

## 2024-01-08 LAB
25(OH)D3 SERPL-MCNC: 41.6 NG/ML (ref 30–100)
ALBUMIN SERPL BCP-MCNC: 4.2 G/DL (ref 3.5–5)
ANION GAP SERPL CALCULATED.3IONS-SCNC: 9 MMOL/L
BUN SERPL-MCNC: 11 MG/DL (ref 5–25)
CALCIUM SERPL-MCNC: 9.1 MG/DL (ref 8.4–10.2)
CHLORIDE SERPL-SCNC: 104 MMOL/L (ref 96–108)
CO2 SERPL-SCNC: 25 MMOL/L (ref 21–32)
CREAT SERPL-MCNC: 0.68 MG/DL (ref 0.6–1.3)
CREAT UR-MCNC: 109.9 MG/DL
GFR SERPL CREATININE-BSD FRML MDRD: 92 ML/MIN/1.73SQ M
GLUCOSE SERPL-MCNC: 164 MG/DL (ref 65–140)
MICROALBUMIN UR-MCNC: 30 MG/L
MICROALBUMIN/CREAT 24H UR: 27 MG/G CREATININE (ref 0–30)
PHOSPHATE SERPL-MCNC: 4 MG/DL (ref 2.3–4.1)
POTASSIUM SERPL-SCNC: 3.9 MMOL/L (ref 3.5–5.3)
SODIUM SERPL-SCNC: 138 MMOL/L (ref 135–147)

## 2024-01-08 PROCEDURE — 82570 ASSAY OF URINE CREATININE: CPT

## 2024-01-08 PROCEDURE — 82306 VITAMIN D 25 HYDROXY: CPT

## 2024-01-08 PROCEDURE — 82043 UR ALBUMIN QUANTITATIVE: CPT

## 2024-01-08 PROCEDURE — 36415 COLL VENOUS BLD VENIPUNCTURE: CPT

## 2024-01-08 PROCEDURE — 80069 RENAL FUNCTION PANEL: CPT

## 2024-01-12 ENCOUNTER — OFFICE VISIT (OUTPATIENT)
Dept: NEPHROLOGY | Facility: CLINIC | Age: 64
End: 2024-01-12
Payer: MEDICARE

## 2024-01-12 VITALS
OXYGEN SATURATION: 92 % | HEIGHT: 55 IN | HEART RATE: 90 BPM | BODY MASS INDEX: 41.42 KG/M2 | WEIGHT: 179 LBS | SYSTOLIC BLOOD PRESSURE: 132 MMHG | DIASTOLIC BLOOD PRESSURE: 82 MMHG

## 2024-01-12 DIAGNOSIS — K31.84 DIABETIC GASTROPARESIS ASSOCIATED WITH TYPE 2 DIABETES MELLITUS: ICD-10-CM

## 2024-01-12 DIAGNOSIS — N18.1 CKD (CHRONIC KIDNEY DISEASE) STAGE 1, GFR 90 ML/MIN OR GREATER: Primary | ICD-10-CM

## 2024-01-12 DIAGNOSIS — R80.1 PERSISTENT PROTEINURIA: ICD-10-CM

## 2024-01-12 DIAGNOSIS — E78.5 HYPERLIPIDEMIA, UNSPECIFIED HYPERLIPIDEMIA TYPE: ICD-10-CM

## 2024-01-12 DIAGNOSIS — E55.9 VITAMIN D DEFICIENCY: ICD-10-CM

## 2024-01-12 DIAGNOSIS — E11.29 TYPE 2 DIABETES MELLITUS WITH MICROALBUMINURIA, WITHOUT LONG-TERM CURRENT USE OF INSULIN: ICD-10-CM

## 2024-01-12 DIAGNOSIS — E11.43 DIABETIC GASTROPARESIS ASSOCIATED WITH TYPE 2 DIABETES MELLITUS: ICD-10-CM

## 2024-01-12 DIAGNOSIS — I10 HYPERTENSION, UNSPECIFIED TYPE: ICD-10-CM

## 2024-01-12 DIAGNOSIS — I10 ESSENTIAL HYPERTENSION: ICD-10-CM

## 2024-01-12 DIAGNOSIS — E66.01 MORBID OBESITY WITH BMI OF 45.0-49.9, ADULT (HCC): ICD-10-CM

## 2024-01-12 DIAGNOSIS — R80.9 TYPE 2 DIABETES MELLITUS WITH MICROALBUMINURIA, WITHOUT LONG-TERM CURRENT USE OF INSULIN: ICD-10-CM

## 2024-01-12 PROCEDURE — 99213 OFFICE O/P EST LOW 20 MIN: CPT | Performed by: INTERNAL MEDICINE

## 2024-01-12 RX ORDER — NIFEDIPINE 30 MG/1
30 TABLET, EXTENDED RELEASE ORAL
Qty: 90 TABLET | Refills: 3 | Status: SHIPPED | OUTPATIENT
Start: 2024-01-12

## 2024-01-12 RX ORDER — SENNOSIDES 8.6 MG
600 CAPSULE ORAL AS NEEDED
COMMUNITY

## 2024-01-12 RX ORDER — LOSARTAN POTASSIUM 50 MG/1
50 TABLET ORAL 2 TIMES DAILY
Qty: 180 TABLET | Refills: 3 | Status: SHIPPED | OUTPATIENT
Start: 2024-01-12

## 2024-01-12 NOTE — PROGRESS NOTES
Nephrology Follow up Consultation  Tova Faulkner 63 y.o. female MRN: [id number]            BACKGROUND:  Tova Faulkner is a 63 y.o.female who was referred by Benjamín Shin MD for evaluation of Follow-up and Chronic Kidney Disease  .      ASSESSMENT / PLAN:   63 y.o.  female with pmh of multiple co-morbidities including morbid obesity, hypertension (x 15yrs), diabetes (x15yrs), arthritis and hyperlipidemia presents to the office for routine follow-up.     CKD stage 1A1:  - After review of records in [facility] as well as Care everywhere patient has a baseline creatinine of 0.5-0.7 mg/dL dating as far back as 2018. Most recent labs show a Creatinine of 0.68 mg/dL on 1/8/24. Renal function remains stable.  Work in 6 months  - likely has underlying CKD secondary to obesity related hyper filtration plus diabetic kidney disease plus hypertensive nephrosclerosis plus age-related nephron loss plus NSAID nephropathy.   -SPEP UPEP free light chain ratio from June 2022 within normal limits.   - abdominal ultrasound from 09/26/2018 showing bilateral kidneys approximately 10 cm.  No hydronephrosis.  At this time would hold off on repeat renal ultrasound as renal parameters continue remains stable.  - Acid base and lytes stable.  - Clinically the patient appears to be euvolemic e  - Recommend to avoid use of NSAIDs, nephrotoxins. Caution advised with regards to exposure to IV contrast dye.   - Discussed with the patient in depth her renal status, including the possible etiologies for CKD.   - Advised the patient that when her GFR is close to 20mL/min then will start discussing about RRT(renal replacement therapy) options such as renal transplant, peritoneal dialysis and hemodialysis.   - Informed the patient about the various options for Renal Replacement therapy.  - Discussed with the patient how we need to work together to delay the progression of CKD with optimal BP control based on their age and co-morbidities, optimal BS control  with HbA1c of <7% and trying to reduce proteinuria by the use of anti-proteinuric agents.     Hypertension:  BP Readings from Last 3 Encounters:   01/12/24 132/82   11/08/23 126/83   09/13/23 120/70     - Patient is on Procardia XL 30 mg p.o. nightly, losartan 50 mg p.o. b.i.d.   -Blood work stable at the visit today no changes we discussed appropriate technique of checking blood pressures and to call with blood pressure updates if any issues  - Goal BP of <  130/80 based on age and comorbidities  - Instructed to follow low sodium (2gm)diet.  - Advised to hold ACEI/ARBs if patient suffers from dehydration due to gastrointestinal losses due to risk of HUNG secondary to failure to autoregulate.    Hemoglobin:  - Goal Hb of 10-12 g/dL  - Most recent labs suggestive of 13.3 grams/deciliter.   - no role for IV iron at this time    CKD-MBD(Mineral Bone Disease):  - Based on patients CKD stage following is the goal of therapy.  - Maintain calcium phosphorus product of < 55.  -Continue on vit D 04321 units QW  -Check vitamin D level in 6 months and then again prior to next visit  -Most recent vitamin D level 41.6 January 2024    Proteinuria:  - proteinuria most likely secondary to diabetic kidney disease plus obesity related hyper filtration.    - advised patient to avoid NSAIDs as could result in proteinuria as well as renal injury.  -Paraproteinemia work-up negative from June 2022.  - most recent micro to creatinine ratio 27 as of January 2024 improved from prior.  -Most recent protein creatinine ratio 990 mg as of June 2022  -Most recent UA with 0-1 RBCs positive leukocytes.  Discussed findings with patient no acute indication for renal biopsy at this time unless patient with worsening significant proteinuria.  Plus patient would be difficult candidate for renal biopsy based on body habitus.  - currently on therapy for proteinuria with Lipitor, losartan  -Patient was tried out on Farxiga unfortunately had allergic  reaction.  Will not be able to challenge.    Lipids:  - on zetia  - goal LDL less than 70  - Management as per PCP    DM:  - management as per Primary team  - on Trulicity, metformin  - advised patient when and if renal parameters continue to deteriorate she may need to be taken off the metformin  - advised of tight glycemic control in order to delay CKD progression  - records reveal A1c consistently greater than 6.5% since 2018,   - most recent A1c from September 2023 at 6.4%, remained stable  - no evidence of diabetic retinopathy as of 08/19/2020 diabetic exam.    Nutrition/morbid obesity:  - Encouraged patient to follow a renal diet comprising of moderate potassium, low phosphorus and protein restriction to 0.8gm/kg.  Advised of dietary habits and weight loss.  - Will check serum albumin with next blood work.     Followup:  - Patient is to follow-up in 12 months, with lab work to be performed in 6 months and then again in a few days prior to the next visit.  Advised patient to call me in 10 days to review the results if they do not hear back from me, as I may have not received the results.  Adilia Mireles MD, FASN, 1/12/2024, 1:10 PM             SUBJECTIVE: 63 y.o. female presents to the office for routine follow-up.  Feels well has no complaints no recent hospitalization no issues with edema thankful for the care information that she is gone today no NSAID use Home blood pressures usually in the 120s highest is 130.  Did try Farxiga unfortunately had an allergic reaction and since then has been off of it.  Reports Procardia has worked really well with her for blood pressure management.  Usually is compliant with her vitamin D occasionally misses 1 dose and then takes it in a couple of days.    Review of Systems   Constitutional:  Negative for chills and fever.   HENT:  Negative for congestion.    Respiratory:  Negative for cough, shortness of breath and wheezing.    Cardiovascular:  Negative for leg swelling.    Gastrointestinal:  Negative for constipation and diarrhea.   Genitourinary:  Negative for difficulty urinating and dysuria.   Musculoskeletal:  Negative for back pain.   Neurological:  Positive for headaches. Negative for dizziness and light-headedness.        Chronic   Psychiatric/Behavioral:  Negative for agitation and confusion.    All other systems reviewed and are negative.      PAST MEDICAL HISTORY:  Past Medical History:   Diagnosis Date   • Back problem     herniated discs   • Chronic pain disorder     back   • Diabetes mellitus ([facility])    • Diverticulitis    • Diverticulosis    • Hyperglycemia    • Hyperlipidemia    • Hypertension    • Impacted cerumen    • Obesity    • Palpitations        PROBLEM LIST    Patient Active Problem List   Diagnosis   • Essential hypertension   • Hyperlipidemia   • LFT elevation   • Chronic low back pain with right-sided sciatica   • Uncontrolled type 2 diabetes mellitus with hyperglycemia ([facility])   • Diabetic gastroparesis associated with type 2 diabetes mellitus    • Acute non-recurrent frontal sinusitis   • Needs flu shot   • Anxiety   • Left foot pain   • Morbid obesity with BMI of 45.0-49.9, adult ([facility])   • Localized edema   • Thoracic spine pain   • Acute right-sided low back pain without sciatica   • Pain in right upper arm   • CKD (chronic kidney disease) stage 1, GFR 90 ml/min or greater   • Persistent proteinuria   • Depression, recurrent ([facility])   • Type 2 diabetes mellitus with microalbuminuria, without long-term current use of insulin    • Neuralgia of right thigh   • Acute pain of right knee   • MAGGIE (obstructive sleep apnea)       PAST SURGICAL HISTORY:  Past Surgical History:   Procedure Laterality Date   • CARPAL TUNNEL RELEASE Right 1983   • DIAGNOSTIC LAPAROSCOPY     • ESOPHAGOGASTRODUODENOSCOPY  10/2018    gastroduodenitis   • MAMMO STEREOTACTIC BREAST BIOPSY LEFT (ALL INC) Left 11/8/2023   • PERIPHERAL ANGIOGRAM     • AL COLONOSCOPY FLX DX W/COLLJ SPEC WHEN PFRMD  "N/A 9/12/2018    dr chowdhury, diverticulosis   • OR ESOPHAGOGASTRODUODENOSCOPY TRANSORAL DIAGNOSTIC N/A 10/10/2018    Procedure: EGD;  Surgeon: Greg Chowdhury DO;  Location:  GI LAB;  Service: General       SOCIAL HISTORY :   reports that she has never smoked. She has never used smokeless tobacco. She reports that she does not drink alcohol and does not use drugs.    FAMILY HISTORY:  Family History   Problem Relation Age of Onset   • Kidney disease Mother    • Hypertension Mother    • Coronary artery disease Father         premature   • Diabetes Sister    • Coronary artery disease Sister    • No Known Problems Daughter    • No Known Problems Maternal Grandmother    • Coronary artery disease Maternal Grandfather    • Diabetes Paternal Grandfather    • Coronary artery disease Paternal Grandfather    • Breast cancer Paternal Aunt 40       ALLERGIES:  Allergies   Allergen Reactions   • Farxiga [Dapagliflozin] Angioedema   • Vicodin [Hydrocodone-Acetaminophen] Angioedema   • Percocet [Oxycodone-Acetaminophen] GI Intolerance   • Gabapentin GI Intolerance     vomiting   • Lyrica [Pregabalin] GI Intolerance     Stomach ache.   • Aspirin GI Intolerance   • Diclofenac Sodium Rash           PHYSICAL EXAM:  Vitals:    01/12/24 1247 01/12/24 1301   BP: 140/86 132/82   BP Location: Left arm    Patient Position: Sitting    Cuff Size: Adult    Pulse: 90    SpO2: 92%    Weight: 81.2 kg (179 lb)    Height: 4' 3\" (1.295 m)        Body mass index is 48.39 kg/m².    Physical Exam  Vitals reviewed.   Constitutional:       General: She is not in acute distress.     Appearance: Normal appearance. She is obese. She is not ill-appearing, toxic-appearing or diaphoretic.   HENT:      Head: Normocephalic and atraumatic.      Mouth/Throat:      Mouth: Mucous membranes are moist.      Pharynx: No oropharyngeal exudate.   Eyes:      General: No scleral icterus.  Cardiovascular:      Rate and Rhythm: Normal rate.   Pulmonary:      Effort: No " respiratory distress.      Breath sounds: Normal breath sounds. No stridor.   Abdominal:      Palpations: There is no mass.      Tenderness: There is no abdominal tenderness. There is no right CVA tenderness or left CVA tenderness.   Musculoskeletal:         General: No swelling.      Cervical back: Normal range of motion. No rigidity.   Skin:     Coloration: Skin is not jaundiced.   Neurological:      General: No focal deficit present.      Mental Status: She is alert and oriented to person, place, and time.   Psychiatric:         Mood and Affect: Mood normal.         Behavior: Behavior normal.         LABORATORY DATA:     Results from last 6 Months   Lab Units 01/08/24  0830 09/06/23  0753   POTASSIUM mmol/L 3.9 4.0   CHLORIDE mmol/L 104 104   CO2 mmol/L 25 24   BUN mg/dL 11 14   CREATININE mg/dL 0.68 0.66   CALCIUM mg/dL 9.1 9.2   PHOSPHORUS mg/dL 4.0 3.5          rest all reviewed    RADIOLOGY:  No orders to display     Rest all reviewed        MEDICATIONS:    Current Outpatient Medications:   •  acetaminophen (TYLENOL) 650 mg CR tablet, Take 600 mg by mouth as needed for mild pain As needed., Disp: , Rfl:   •  Comfort Touch Plus Lancets 30G MISC, USE TO TEST THE BLOOD SUGAR EVERY MORNING, Disp: 100 each, Rfl: 3  •  dulaglutide (Trulicity) 3 MG/0.5ML injection, Inject 0.5 mL (3 mg total) under the skin every 7 days, Disp: 2 mL, Rfl: 3  •  ergocalciferol (VITAMIN D2) 50,000 units, TAKE ONE CAPSULE BY MOUTH ONCE EVERY WEEK, Disp: 12 capsule, Rfl: 2  •  ezetimibe (ZETIA) 10 mg tablet, TAKE ONE TABLET BY MOUTH ONCE DAILY, Disp: 30 tablet, Rfl: 3  •  FREESTYLE LITE test strip, USE TO TEST THE BLOOD SUGAR THREE TIMES A DAY, Disp: 100 strip, Rfl: 0  •  losartan (COZAAR) 50 mg tablet, Take 1 tablet (50 mg total) by mouth 2 (two) times a day, Disp: 180 tablet, Rfl: 3  •  metFORMIN (GLUCOPHAGE) 500 mg tablet, Take 1 tablet (500 mg total) by mouth 2 (two) times a day with meals, Disp: 180 tablet, Rfl: 3  •  NIFEdipine  "(PROCARDIA XL) 30 mg 24 hr tablet, Take 1 tablet (30 mg total) by mouth daily at bedtime, Disp: 90 tablet, Rfl: 3  •  UltiCare Alcohol Swabs 70 % PADS, USE TO CLEAN THE AREAS BEFORE TESTING BLOOD SUGAR OR/AND INJECTING INSULIN AS DIRECTED, Disp: 100 each, Rfl: 3  •  traZODone (DESYREL) 50 mg tablet, TAKE ONE TABLET BY MOUTH ONCE DAILY AT BEDTIME, Disp: 90 tablet, Rfl: 2          Portions of the record may have been created with voice recognition software. Occasional wrong word or \"sound a like\" substitutions may have occurred due to the inherent limitations of voice recognition software. Read the chart carefully and recognize, using context, where substitutions have occurred.If you have any questions, please contact the dictating provider.      "

## 2024-01-12 NOTE — PATIENT INSTRUCTIONS
- get blood work in 6months    - Please call me in 10 days after having your blood work done to review the results if you do not hear back from me or my office, as I may have not received the results.  - please remember to perform blood work prior to the next visit.  - Please call if the blood pressure top number is greater than 140 or less than 110 consistently.  - Please call if you are gaining more than 2lbs in 2 days for adjustment of water pills.  ~ Please AVOID the following pain medications.  LIST OF NSAIDS (NONSTEROIDAL ANTI-INFLAMMATORY DRUGS) AND BURGOS-2 INHIBITORS    DIFLUNISAL (DOLOBID)  IBUPROFEN (MOTRIN, ADVIL)  FLURBIPROFEN (ANSAID)  KETOPROFEN (ORUDIS, ORUVAIL)  FENOPROFEN (NALFON)  NABUMETONE (RELAFEN)  PIROXICAM (FELDENE)  NAPROXEN (ALEVE, NAPROSYN, NAPRELAN, ANAPROX)  DICLOFENAC (VOLTAREN, CATAFLAM)  INDOMETHACIN (INDOCIN)  SULINDAC (CLINORIL)  TOLMETIN (TOLETIN)  ETODOLAC (LODINE)  MELOXICAM (MOBIC)  KETOROLAC (TORADOL)  OXAPROZIN (DAYPRO)  CELECOXIB (CELEBREX)    Things to do to reduce your blood pressure include working with all your physician to do the following:  ~ stop smoking if you smoke.  ~ increase cardiovascular exercise like walking and swimming.   ~ modify your diet to decrease fat and salt intake.  ~ reduce your weight if you are overweight or obese.  ~ increase the consumption of fruits, vegetables and whole grains.  ~ decrease alcohol consumption if you consume alcohol.   ~ try to minimize stress in your life with lifestyle modifications.   ~ be compliant with your anti-hypertensive medications.   ~ adjust your medications to help improve your vascular stiffness and decrease risks for heart attacks and strokes.     Exercise to Lose Weight     Exercise and a healthy diet may help you lose weight. Your doctor may suggest specific exercises.     EXERCISE IDEAS AND TIPS     Choose low-cost things you enjoy doing, such as walking, bicycling, or exercising to workout videos.   Take stairs  instead of the elevator.   Walk during your lunch break.   Park your car further away from work or school.   Go to a gym or an exercise class.   Start with 5 to 10 minutes of exercise each day. Build up to 30 minutes of exercise 4 to 6 days a week.   Wear shoes with good support and comfortable clothes.   Stretch before and after working out.   Work out until you breathe harder and your heart beats faster.   Drink extra water when you exercise.   Do not do so much that you hurt yourself, feel dizzy, or get very short of breath.     Exercises that burn about 150 calories:   Running 1 ½ miles in 15 minutes.   Playing volleyball for 45 to 60 minutes.   Washing and waxing a car for 45 to 60 minutes.   Playing touch football for 45 minutes.   Walking 1 ¾ miles in 35 minutes.   Pushing a stroller 1 ½ miles in 30 minutes.   Playing basketball for 30 minutes.   Raking leaves for 30 minutes.   Bicycling 5 miles in 30 minutes.   Walking 2 miles in 30 minutes.   Dancing for 30 minutes.   Shoveling snow for 15 minutes.   Swimming laps for 20 minutes.   Walking up stairs for 15 minutes.   Bicycling 4 miles in 15 minutes.   Gardening for 30 to 45 minutes.   Jumping rope for 15 minutes.   Washing windows or floors for 45 to 60 minutes.

## 2024-01-15 DIAGNOSIS — K31.84 DIABETIC GASTROPARESIS ASSOCIATED WITH TYPE 2 DIABETES MELLITUS: ICD-10-CM

## 2024-01-15 DIAGNOSIS — R80.9 TYPE 2 DIABETES MELLITUS WITH MICROALBUMINURIA, WITHOUT LONG-TERM CURRENT USE OF INSULIN: ICD-10-CM

## 2024-01-15 DIAGNOSIS — I10 ESSENTIAL HYPERTENSION: ICD-10-CM

## 2024-01-15 DIAGNOSIS — N18.1 CKD (CHRONIC KIDNEY DISEASE) STAGE 1, GFR 90 ML/MIN OR GREATER: ICD-10-CM

## 2024-01-15 DIAGNOSIS — R80.1 PERSISTENT PROTEINURIA: ICD-10-CM

## 2024-01-15 DIAGNOSIS — E11.29 TYPE 2 DIABETES MELLITUS WITH MICROALBUMINURIA, WITHOUT LONG-TERM CURRENT USE OF INSULIN: ICD-10-CM

## 2024-01-15 DIAGNOSIS — E11.43 DIABETIC GASTROPARESIS ASSOCIATED WITH TYPE 2 DIABETES MELLITUS: ICD-10-CM

## 2024-01-15 DIAGNOSIS — E66.01 MORBID OBESITY WITH BMI OF 45.0-49.9, ADULT (HCC): ICD-10-CM

## 2024-01-15 DIAGNOSIS — E11.65 UNCONTROLLED TYPE 2 DIABETES MELLITUS WITH HYPERGLYCEMIA (HCC): ICD-10-CM

## 2024-01-15 RX ORDER — BLOOD-GLUCOSE METER
KIT MISCELLANEOUS
Qty: 300 STRIP | Refills: 3 | Status: SHIPPED | OUTPATIENT
Start: 2024-01-15

## 2024-01-15 RX ORDER — DULAGLUTIDE 3 MG/.5ML
INJECTION, SOLUTION SUBCUTANEOUS
Qty: 2 ML | Refills: 3 | Status: SHIPPED | OUTPATIENT
Start: 2024-01-15

## 2024-01-16 RX ORDER — DAPAGLIFLOZIN 10 MG/1
10 TABLET, FILM COATED ORAL DAILY
Qty: 90 TABLET | Refills: 2 | OUTPATIENT
Start: 2024-01-16

## 2024-01-18 ENCOUNTER — TELEPHONE (OUTPATIENT)
Age: 64
End: 2024-01-18

## 2024-01-18 NOTE — TELEPHONE ENCOUNTER
EMG 2 limb lower extremity (Order [id number])     The above test will  soon.  She needed to r/s her test since she is coughing.  They advised her they would not see her.    Please update Lab.

## 2024-01-22 ENCOUNTER — OFFICE VISIT (OUTPATIENT)
Dept: FAMILY MEDICINE CLINIC | Facility: CLINIC | Age: 64
End: 2024-01-22
Payer: MEDICARE

## 2024-01-22 VITALS
HEART RATE: 92 BPM | RESPIRATION RATE: 16 BRPM | BODY MASS INDEX: 41.19 KG/M2 | DIASTOLIC BLOOD PRESSURE: 80 MMHG | TEMPERATURE: 98 F | SYSTOLIC BLOOD PRESSURE: 138 MMHG | HEIGHT: 55 IN | OXYGEN SATURATION: 99 % | WEIGHT: 178 LBS

## 2024-01-22 DIAGNOSIS — F33.9 DEPRESSION, RECURRENT (HCC): ICD-10-CM

## 2024-01-22 DIAGNOSIS — E11.65 UNCONTROLLED TYPE 2 DIABETES MELLITUS WITH HYPERGLYCEMIA (HCC): ICD-10-CM

## 2024-01-22 DIAGNOSIS — U07.1 COVID-19: ICD-10-CM

## 2024-01-22 DIAGNOSIS — E66.01 MORBID OBESITY WITH BMI OF 45.0-49.9, ADULT (HCC): ICD-10-CM

## 2024-01-22 DIAGNOSIS — R05.1 ACUTE COUGH: Primary | ICD-10-CM

## 2024-01-22 PROCEDURE — 99214 OFFICE O/P EST MOD 30 MIN: CPT | Performed by: FAMILY MEDICINE

## 2024-01-22 PROCEDURE — 87636 SARSCOV2 & INF A&B AMP PRB: CPT | Performed by: FAMILY MEDICINE

## 2024-01-22 RX ORDER — DEXTROMETHORPHAN HYDROBROMIDE AND PROMETHAZINE HYDROCHLORIDE 15; 6.25 MG/5ML; MG/5ML
5 SYRUP ORAL 4 TIMES DAILY PRN
Qty: 150 ML | Refills: 0 | Status: SHIPPED | OUTPATIENT
Start: 2024-01-22

## 2024-01-22 RX ORDER — BENZONATATE 200 MG/1
200 CAPSULE ORAL 3 TIMES DAILY PRN
Qty: 20 CAPSULE | Refills: 0 | Status: SHIPPED | OUTPATIENT
Start: 2024-01-22

## 2024-01-22 RX ORDER — NIRMATRELVIR AND RITONAVIR 300-100 MG
3 KIT ORAL 2 TIMES DAILY
Qty: 30 TABLET | Refills: 0 | Status: SHIPPED | OUTPATIENT
Start: 2024-01-22 | End: 2024-01-27

## 2024-01-22 NOTE — PROGRESS NOTES
Name: Tova Faulkner      : 1960      MRN: [id number]  Encounter Provider: Benjamín Shin MD  Encounter Date: 2024   Encounter department: [facility] C[facility] (formerly [facility]3]    Assessment & Plan   1. Acute Cough/COVID-19:Paxlovid as prescribed. Monitor symptoms and advise on strict isolation measures to prevent transmission. Symptomatic treatment with Tessalon and Promethazine with Dextromethorphan.. Educated on warning signs for COVID-19 complications requiring urgent evaluation.  2. Uncontrolled Diabetes Mellitus Type II: Reinforced the importance of medication adherence, dietary modifications, and regular monitoring of blood glucose levels. Consider endocrinology referral for optimization of glycemic control.  3. Obesity (BMI 48): Encouraged lifestyle modifications including a balanced diet and increased physical activity as tolerated. Offer referral to a nutritionist and consider weight management program enrollment.  4. Depression in Remission: Continue current management and monitor for any signs of relapse, particularly in the context of recent illness and stressors.   1. COVID-19  -     nirmatrelvir & ritonavir (Paxlovid, 300/100,) tablet therapy pack; Take 3 tablets by mouth 2 (two) times a day for 5 days Take 2 nirmatrelvir tablets + 1 ritonavir tablet together per dose    2. Acute cough  -     benzonatate (TESSALON) 200 MG capsule; Take 1 capsule (200 mg total) by mouth 3 (three) times a day as needed for cough  -     promethazine-dextromethorphan (PHENERGAN-DM) 6.25-15 mg/5 mL oral syrup; Take 5 mL by mouth 4 (four) times a day as needed for cough    3. Uncontrolled type 2 diabetes mellitus with hyperglycemia (HCC)  -     nirmatrelvir & ritonavir (Paxlovid, 300/100,) tablet therapy pack; Take 3 tablets by mouth 2 (two) times a day for 5 days Take 2 nirmatrelvir tablets + 1 ritonavir tablet together per dose    4. Depression, recurrent (HCC)  Assessment & Plan:  Denies suicide  "ideations  Continue current treatment and follow up with Mental Health team.       5. Morbid obesity with BMI of 45.0-49.9, adult (HCC)  -     nirmatrelvir & ritonavir (Paxlovid, 300/100,) tablet therapy pack; Take 3 tablets by mouth 2 (two) times a day for 5 days Take 2 nirmatrelvir tablets + 1 ritonavir tablet together per dose      H&P  Subjective  63-year-old patient presents with a 4-day history of cough. Reports testing positive for COVID-19 via a home test yesterday. Frequent cough.      Lab Results   Component Value Date    HGBA1C 6.4 09/13/2023       Allergies   Allergen Reactions    Farxiga [Dapagliflozin] Angioedema    Vicodin [Hydrocodone-Acetaminophen] Angioedema    Percocet [Oxycodone-Acetaminophen] GI Intolerance    Gabapentin GI Intolerance     vomiting    Lyrica [Pregabalin] GI Intolerance     Stomach ache.    Aspirin GI Intolerance    Diclofenac Sodium Rash     Vital Signs  /80 (BP Location: Left arm, Patient Position: Sitting, Cuff Size: Standard)   Pulse 92   Temp 98 °F (36.7 °C) (Tympanic)   Resp 16   Ht 4' 3\" (1.295 m)   Wt 80.7 kg (178 lb)   SpO2 99%   BMI 48.12 kg/m²       Physical Exam  Physical examination to focus on respiratory assessment showed no signs of distress or complications.      .      Benjamín Shin MD   [facility] CARE [facility]            "

## 2024-01-22 NOTE — PROGRESS NOTES
COVID-19 Outpatient Progress Note    Assessment/Plan:    Problem List Items Addressed This Visit        Endocrine    Uncontrolled type 2 diabetes mellitus with hyperglycemia (HCC)    Relevant Medications    nirmatrelvir & ritonavir (Paxlovid, 300/100,) tablet therapy pack       Other    Morbid obesity with BMI of 45.0-49.9, adult (HCC)    Relevant Medications    nirmatrelvir & ritonavir (Paxlovid, 300/100,) tablet therapy pack    Depression, recurrent (HCC)     Denies suicide ideations  Continue current treatment and follow up with Mental Health team.         Other Visit Diagnoses     COVID-19    -  Primary    Relevant Medications    nirmatrelvir & ritonavir (Paxlovid, 300/100,) tablet therapy pack    Acute cough        Relevant Medications    benzonatate (TESSALON) 200 MG capsule    promethazine-dextromethorphan (PHENERGAN-DM) 6.25-15 mg/5 mL oral syrup         COVID-19 Plan    Encounter provider: Benjamín Shin MD     Provider located at: [facility]ARE [address]  [address] [place] [id number] [phone]     Recent Visits  No visits were found meeting these conditions.  Showing recent visits within past 7 days and meeting all other requirements  Today's Visits  Date Type Provider Dept   01/22/24 Office Visit Benjamín Shin MD [facility]   Showing today's visits and meeting all other requirements  Future Appointments  No visits were found meeting these conditions.  Showing future appointments within next 150 days and meeting all other requirements     COVID-19 HPI  Lab Results   Component Value Date    SARSCOV2 Negative 04/22/2021       Review of Systems  Current Outpatient Medications on File Prior to Visit   Medication Sig   • acetaminophen (TYLENOL) 650 mg CR tablet Take 600 mg by mouth as needed for mild pain As needed.   • Comfort Touch Plus Lancets 30G MISC USE TO TEST THE BLOOD SUGAR EVERY MORNING   •  "ergocalciferol (VITAMIN D2) 50,000 units TAKE ONE CAPSULE BY MOUTH ONCE EVERY WEEK   • ezetimibe (ZETIA) 10 mg tablet TAKE ONE TABLET BY MOUTH ONCE DAILY   • glucose blood (FREESTYLE LITE) test strip USE TO TEST THE BLOOD SUGAR THREE TIMES A DAY Dx: E11.65   • losartan (COZAAR) 50 mg tablet Take 1 tablet (50 mg total) by mouth 2 (two) times a day   • metFORMIN (GLUCOPHAGE) 500 mg tablet Take 1 tablet (500 mg total) by mouth 2 (two) times a day with meals   • NIFEdipine (PROCARDIA XL) 30 mg 24 hr tablet Take 1 tablet (30 mg total) by mouth daily at bedtime   • traZODone (DESYREL) 50 mg tablet TAKE ONE TABLET BY MOUTH ONCE DAILY AT BEDTIME   • Trulicity 3 MG/0.5ML injection INJECT THREE MG SUBCUTANEOUSLY ONCE EVERY WEEK   • UltiCare Alcohol Swabs 70 % PADS USE TO CLEAN THE AREAS BEFORE TESTING BLOOD SUGAR OR/AND INJECTING INSULIN AS DIRECTED   • [DISCONTINUED] carvedilol (COREG) 12.5 mg tablet Take 1 tablet (12.5 mg total) by mouth 2 (two) times a day with meals       Objective:    /80 (BP Location: Left arm, Patient Position: Sitting, Cuff Size: Standard)   Pulse 92   Temp 98 °F (36.7 °C) (Tympanic)   Resp 16   Ht 4' 3\" (1.295 m)   Wt 80.7 kg (178 lb)   SpO2 99%   BMI 48.12 kg/m²        Physical Exam  Benjamín Shin MD    "

## 2024-01-23 LAB
FLUAV RNA RESP QL NAA+PROBE: NEGATIVE
FLUBV RNA RESP QL NAA+PROBE: NEGATIVE
SARS-COV-2 RNA RESP QL NAA+PROBE: NEGATIVE

## 2024-02-05 DIAGNOSIS — E11.9 TYPE 2 DIABETES MELLITUS WITHOUT COMPLICATION, UNSPECIFIED WHETHER LONG TERM INSULIN USE (HCC): ICD-10-CM

## 2024-02-06 RX ORDER — LANCETS 30 GAUGE
EACH MISCELLANEOUS
Qty: 100 EACH | Refills: 5 | Status: SHIPPED | OUTPATIENT
Start: 2024-02-06

## 2024-02-14 DIAGNOSIS — E78.2 MIXED HYPERLIPIDEMIA: ICD-10-CM

## 2024-02-14 RX ORDER — EZETIMIBE 10 MG/1
10 TABLET ORAL DAILY
Qty: 30 TABLET | Refills: 5 | Status: SHIPPED | OUTPATIENT
Start: 2024-02-14

## 2024-02-20 RX ORDER — ATORVASTATIN CALCIUM 40 MG/1
TABLET, FILM COATED ORAL
COMMUNITY
Start: 2024-02-02

## 2024-02-21 ENCOUNTER — OFFICE VISIT (OUTPATIENT)
Dept: FAMILY MEDICINE CLINIC | Facility: CLINIC | Age: 64
End: 2024-02-21
Payer: MEDICARE

## 2024-02-21 VITALS
HEART RATE: 92 BPM | WEIGHT: 181 LBS | HEIGHT: 55 IN | SYSTOLIC BLOOD PRESSURE: 136 MMHG | DIASTOLIC BLOOD PRESSURE: 80 MMHG | OXYGEN SATURATION: 98 % | RESPIRATION RATE: 16 BRPM | BODY MASS INDEX: 41.89 KG/M2 | TEMPERATURE: 98 F

## 2024-02-21 DIAGNOSIS — R80.1 PERSISTENT PROTEINURIA: ICD-10-CM

## 2024-02-21 DIAGNOSIS — M79.651 PAIN OF RIGHT THIGH: ICD-10-CM

## 2024-02-21 DIAGNOSIS — Z23 ENCOUNTER FOR IMMUNIZATION: ICD-10-CM

## 2024-02-21 DIAGNOSIS — K21.9 CHRONIC GERD: ICD-10-CM

## 2024-02-21 DIAGNOSIS — E55.9 VITAMIN D DEFICIENCY: ICD-10-CM

## 2024-02-21 DIAGNOSIS — E11.29 TYPE 2 DIABETES MELLITUS WITH MICROALBUMINURIA, WITHOUT LONG-TERM CURRENT USE OF INSULIN: ICD-10-CM

## 2024-02-21 DIAGNOSIS — R80.9 TYPE 2 DIABETES MELLITUS WITH MICROALBUMINURIA, WITHOUT LONG-TERM CURRENT USE OF INSULIN: ICD-10-CM

## 2024-02-21 DIAGNOSIS — Z00.00 MEDICARE ANNUAL WELLNESS VISIT, SUBSEQUENT: Primary | ICD-10-CM

## 2024-02-21 DIAGNOSIS — E11.65 UNCONTROLLED TYPE 2 DIABETES MELLITUS WITH HYPERGLYCEMIA (HCC): ICD-10-CM

## 2024-02-21 PROBLEM — J01.10 ACUTE NON-RECURRENT FRONTAL SINUSITIS: Status: RESOLVED | Noted: 2020-10-02 | Resolved: 2024-02-21

## 2024-02-21 LAB — SL AMB POCT HEMOGLOBIN AIC: 6.4 (ref ?–6.5)

## 2024-02-21 PROCEDURE — G0439 PPPS, SUBSEQ VISIT: HCPCS | Performed by: FAMILY MEDICINE

## 2024-02-21 PROCEDURE — 99214 OFFICE O/P EST MOD 30 MIN: CPT | Performed by: FAMILY MEDICINE

## 2024-02-21 PROCEDURE — 83036 HEMOGLOBIN GLYCOSYLATED A1C: CPT | Performed by: FAMILY MEDICINE

## 2024-02-21 PROCEDURE — G0009 ADMIN PNEUMOCOCCAL VACCINE: HCPCS | Performed by: FAMILY MEDICINE

## 2024-02-21 PROCEDURE — 90677 PCV20 VACCINE IM: CPT | Performed by: FAMILY MEDICINE

## 2024-02-21 RX ORDER — OMEPRAZOLE 20 MG/1
20 CAPSULE, DELAYED RELEASE ORAL
Qty: 30 CAPSULE | Refills: 5 | Status: SHIPPED | OUTPATIENT
Start: 2024-02-21 | End: 2024-08-19

## 2024-02-21 NOTE — PATIENT INSTRUCTIONS
Medicare Preventive Visit Patient Instructions  Thank you for completing your Welcome to Medicare Visit or Medicare Annual Wellness Visit today. Your next wellness visit will be due in one year (2/21/2025).  The screening/preventive services that you may require over the next 5-10 years are detailed below. Some tests may not apply to you based off risk factors and/or age. Screening tests ordered at today's visit but not completed yet may show as past due. Also, please note that scanned in results may not display below.  Preventive Screenings:  Service Recommendations Previous Testing/Comments   Colorectal Cancer Screening  * Colonoscopy    * Fecal Occult Blood Test (FOBT)/Fecal Immunochemical Test (FIT)  * Fecal DNA/Cologuard Test  * Flexible Sigmoidoscopy Age: 45-75 years old   Colonoscopy: every 10 years (may be performed more frequently if at higher risk)  OR  FOBT/FIT: every 1 year  OR  Cologuard: every 3 years  OR  Sigmoidoscopy: every 5 years  Screening may be recommended earlier than age 45 if at higher risk for colorectal cancer. Also, an individualized decision between you and your healthcare provider will decide whether screening between the ages of 76-85 would be appropriate. Colonoscopy: 09/12/2018  FOBT/FIT: Not on file  Cologuard: Not on file  Sigmoidoscopy: Not on file    Screening Current     Breast Cancer Screening Age: 40+ years old  Frequency: every 1-2 years  Not required if history of left and right mastectomy Mammogram: 06/07/2023    Screening Current   Cervical Cancer Screening Between the ages of 21-29, pap smear recommended once every 3 years.   Between the ages of 30-65, can perform pap smear with HPV co-testing every 5 years.   Recommendations may differ for women with a history of total hysterectomy, cervical cancer, or abnormal pap smears in past. Pap Smear: Not on file        Hepatitis C Screening Once for adults born between 1945 and 1965  More frequently in patients at high risk for  Hepatitis C Hep C Antibody: 08/17/2018    Screening Current   Diabetes Screening 1-2 times per year if you're at risk for diabetes or have pre-diabetes Fasting glucose: 105 mg/dL (2/20/2023)  A1C: 6.4 (9/13/2023)  Screening Not Indicated  History Diabetes   Cholesterol Screening Once every 5 years if you don't have a lipid disorder. May order more often based on risk factors. Lipid panel: 02/20/2023    Screening Not Indicated  History Lipid Disorder     Other Preventive Screenings Covered by Medicare:  Abdominal Aortic Aneurysm (AAA) Screening: covered once if your at risk. You're considered to be at risk if you have a family history of AAA.  Lung Cancer Screening: covers low dose CT scan once per year if you meet all of the following conditions: (1) Age 55-77; (2) No signs or symptoms of lung cancer; (3) Current smoker or have quit smoking within the last 15 years; (4) You have a tobacco smoking history of at least 20 pack years (packs per day multiplied by number of years you smoked); (5) You get a written order from a healthcare provider.  Glaucoma Screening: covered annually if you're considered high risk: (1) You have diabetes OR (2) Family history of glaucoma OR (3)  aged 50 and older OR (4)  American aged 65 and older  Osteoporosis Screening: covered every 2 years if you meet one of the following conditions: (1) You're estrogen deficient and at risk for osteoporosis based off medical history and other findings; (2) Have a vertebral abnormality; (3) On glucocorticoid therapy for more than 3 months; (4) Have primary hyperparathyroidism; (5) On osteoporosis medications and need to assess response to drug therapy.   Last bone density test (DXA Scan): Not on file.  HIV Screening: covered annually if you're between the age of 15-65. Also covered annually if you are younger than 15 and older than 65 with risk factors for HIV infection. For pregnant patients, it is covered up to 3 times per  pregnancy.    Immunizations:  Immunization Recommendations   Influenza Vaccine Annual influenza vaccination during flu season is recommended for all persons aged >= 6 months who do not have contraindications   Pneumococcal Vaccine   * Pneumococcal conjugate vaccine = PCV13 (Prevnar 13), PCV15 (Vaxneuvance), PCV20 (Prevnar 20)  * Pneumococcal polysaccharide vaccine = PPSV23 (Pneumovax) Adults 19-63 yo with certain risk factors or if 65+ yo  If never received any pneumonia vaccine: recommend Prevnar 20 (PCV20)  Give PCV20 if previously received 1 dose of PCV13 or PPSV23   Hepatitis B Vaccine 3 dose series if at intermediate or high risk (ex: diabetes, end stage renal disease, liver disease)   Respiratory syncytial virus (RSV) Vaccine - COVERED BY MEDICARE PART D  * RSVPreF3 (Arexvy) CDC recommends that adults 60 years of age and older may receive a single dose of RSV vaccine using shared clinical decision-making (SCDM)   Tetanus (Td) Vaccine - COST NOT COVERED BY MEDICARE PART B Following completion of primary series, a booster dose should be given every 10 years to maintain immunity against tetanus. Td may also be given as tetanus wound prophylaxis.   Tdap Vaccine - COST NOT COVERED BY MEDICARE PART B Recommended at least once for all adults. For pregnant patients, recommended with each pregnancy.   Shingles Vaccine (Shingrix) - COST NOT COVERED BY MEDICARE PART B  2 shot series recommended in those 19 years and older who have or will have weakened immune systems or those 50 years and older     Health Maintenance Due:      Topic Date Due   • Cervical Cancer Screening  Never done   • Breast Cancer Screening: Mammogram  06/07/2024   • Colorectal Cancer Screening  09/12/2028   • HIV Screening  Completed   • Hepatitis C Screening  Completed     Immunizations Due:      Topic Date Due   • Pneumococcal Vaccine: Pediatrics (0 to 5 Years) and At-Risk Patients (6 to 64 Years) (2 of 2 - PCV) 11/23/2019   • COVID-19 Vaccine (5 -  2023-24 season) 09/01/2023     Advance Directives   What are advance directives?  Advance directives are legal documents that state your wishes and plans for medical care. These plans are made ahead of time in case you lose your ability to make decisions for yourself. Advance directives can apply to any medical decision, such as the treatments you want, and if you want to donate organs.   What are the types of advance directives?  There are many types of advance directives, and each state has rules about how to use them. You may choose a combination of any of the following:  Living will:  This is a written record of the treatment you want. You can also choose which treatments you do not want, which to limit, and which to stop at a certain time. This includes surgery, medicine, IV fluid, and tube feedings.   Durable power of  for healthcare (DPAHC):  This is a written record that states who you want to make healthcare choices for you when you are unable to make them for yourself. This person, called a proxy, is usually a family member or a friend. You may choose more than 1 proxy.  Do not resuscitate (DNR) order:  A DNR order is used in case your heart stops beating or you stop breathing. It is a request not to have certain forms of treatment, such as CPR. A DNR order may be included in other types of advance directives.  Medical directive:  This covers the care that you want if you are in a coma, near death, or unable to make decisions for yourself. You can list the treatments you want for each condition. Treatment may include pain medicine, surgery, blood transfusions, dialysis, IV or tube feedings, and a ventilator (breathing machine).  Values history:  This document has questions about your views, beliefs, and how you feel and think about life. This information can help others choose the care that you would choose.  Why are advance directives important?  An advance directive helps you control your care.  Although spoken wishes may be used, it is better to have your wishes written down. Spoken wishes can be misunderstood, or not followed. Treatments may be given even if you do not want them. An advance directive may make it easier for your family to make difficult choices about your care.   Weight Management   Why it is important to manage your weight:  Being overweight increases your risk of health conditions such as heart disease, high blood pressure, type 2 diabetes, and certain types of cancer. It can also increase your risk for osteoarthritis, sleep apnea, and other respiratory problems. Aim for a slow, steady weight loss. Even a small amount of weight loss can lower your risk of health problems.  How to lose weight safely:  A safe and healthy way to lose weight is to eat fewer calories and get regular exercise. You can lose up about 1 pound a week by decreasing the number of calories you eat by 500 calories each day.   Healthy meal plan for weight management:  A healthy meal plan includes a variety of foods, contains fewer calories, and helps you stay healthy. A healthy meal plan includes the following:  Eat whole-grain foods more often.  A healthy meal plan should contain fiber. Fiber is the part of grains, fruits, and vegetables that is not broken down by your body. Whole-grain foods are healthy and provide extra fiber in your diet. Some examples of whole-grain foods are whole-wheat breads and pastas, oatmeal, brown rice, and bulgur.  Eat a variety of vegetables every day.  Include dark, leafy greens such as spinach, kale, saray greens, and mustard greens. Eat yellow and orange vegetables such as carrots, sweet potatoes, and winter squash.   Eat a variety of fruits every day.  Choose fresh or canned fruit (canned in its own juice or light syrup) instead of juice. Fruit juice has very little or no fiber.  Eat low-fat dairy foods.  Drink fat-free (skim) milk or 1% milk. Eat fat-free yogurt and low-fat cottage  cheese. Try low-fat cheeses such as mozzarella and other reduced-fat cheeses.  Choose meat and other protein foods that are low in fat.  Choose beans or other legumes such as split peas or lentils. Choose fish, skinless poultry (chicken or turkey), or lean cuts of red meat (beef or pork). Before you cook meat or poultry, cut off any visible fat.   Use less fat and oil.  Try baking foods instead of frying them. Add less fat, such as margarine, sour cream, regular salad dressing and mayonnaise to foods. Eat fewer high-fat foods. Some examples of high-fat foods include french fries, doughnuts, ice cream, and cakes.  Eat fewer sweets.  Limit foods and drinks that are high in sugar. This includes candy, cookies, regular soda, and sweetened drinks.  Exercise:  Exercise at least 30 minutes per day on most days of the week. Some examples of exercise include walking, biking, dancing, and swimming. You can also fit in more physical activity by taking the stairs instead of the elevator or parking farther away from stores. Ask your healthcare provider about the best exercise plan for you.      © Copyright [employer] 2018 Information is for End User's use only and may not be sold, redistributed or otherwise used for commercial purposes. All illustrations and images included in CareNotes® are the copyrighted property of [employer] or [employer]

## 2024-02-21 NOTE — PROGRESS NOTES
Assessment and Plan:   1. Medicare annual wellness visit, subsequent    - CBC and differential; Future    2. Type 2 diabetes mellitus with microalbuminuria, without long-term current use of insulin     - Lipid Panel with Direct LDL reflex; Future  - POCT hemoglobin A1c  - semaglutide, 2 mg/dose, (Ozempic) 8 mg/ mL injection pen; Inject 0.75 mL (2 mg total) under the skin every 7 days  Dispense: 3 mL; Refill: 5    3. Chronic GERD    - omeprazole (PriLOSEC) 20 mg delayed release capsule; Take 1 capsule (20 mg total) by mouth daily before breakfast  Dispense: 30 capsule; Refill: 5    4. Pain of right thigh    - EMG 2 limb lower extremity; Future    5. Uncontrolled type 2 diabetes mellitus with hyperglycemia (HCC)    - metFORMIN (GLUCOPHAGE) 500 mg tablet; Take 1 tablet (500 mg total) by mouth 2 (two) times a day with meals  Dispense: 180 tablet; Refill: 3    6. Vitamin D deficiency      7. Persistent proteinuria      8. Encounter for immunization    - Pneumococcal Conjugate Vaccine 20-valent (Pcv20)    During this visit, we have a goal to personalize prevention. I discussed with the patient about    Patient Active Problem List   Diagnosis    Essential hypertension    Hyperlipidemia    LFT elevation    Chronic low back pain with right-sided sciatica    Uncontrolled type 2 diabetes mellitus with hyperglycemia (HCC)    Diabetic gastroparesis associated with type 2 diabetes mellitus     Acute non-recurrent frontal sinusitis    Needs flu shot    Anxiety    Left foot pain    Morbid obesity with BMI of 45.0-49.9, adult (HCC)    Localized edema    Thoracic spine pain    Acute right-sided low back pain without sciatica    Pain in right upper arm    CKD (chronic kidney disease) stage 1, GFR 90 ml/min or greater    Persistent proteinuria    Depression, recurrent (HCC)    Type 2 diabetes mellitus with microalbuminuria, without long-term current use of insulin     Neuralgia of right thigh    Acute pain of right knee    MAGGIE  (obstructive sleep apnea)    and decreased strength--a lifestyle plan to decrease the impact of current problems. I did a Health risk assessment and discussed ways to stay healthier with the patient. We also reviewed the current medications, the need to avoid polypharmacy in her current treatment, and how chronic conditions impact now and later. A recommended healthy diet and exercising as frequently as possible will help better control the patient's chronic conditions, Immunizations, and the need to comply with current CDC recommendations. The patient declined at this time advanced directives. I encouraged against using alcohol, tobacco, and recreational illegal prescribed and non-prescribed drugs. About smoking: recommended avoid exposure to second hand smoking.    Problem List Items Addressed This Visit    None  Visit Diagnoses           Diabetes Mellitus Type 2 Continue current regimen of Metformin. Encourage routine monitoring of blood glucose levels. Change to Ozempic 2 mg weekly since poor control in weight.  Depression Continue to monitor and manage as per current psychiatric treatment plan. Assess the need for therapy or adjustments in medication given the stress-related eating and family stressors.  Constipation Recommend increased dietary fiber intake, hydration, and physical activity. Consider a trial of over-the-counter laxatives if no improvement.  Acid Reflux Prescribe Omeprazole and advise lifestyle modifications including head-of-bed elevation and avoiding trigger foods. Follow-up in 8 weeks to reassess symptoms.  Stress Management Encourage stress reduction techniques such as mindfulness or counseling. Consider referral to a psychologist for cognitive behavioral therapy.  Pain in right thigh: seems of neuropathic origin. To check EMG.  Proteinuria: improved. Continue current regimen, Importance of tighter control of BP and Diabetes discussed with patient. Avoidance of NSAID's.      Depression  Screening Follow-up Plan: Patient's depression screening was positive with a PHQ-2 score of . Their PHQ-9 score was 6. Continue regular follow-up with their psychologist/therapist/psychiatrist who is managing their mental health condition(s).         Preventive health issues were discussed with patient, and age appropriate screening tests were ordered as noted in patient's After Visit Summary.  Personalized health advice and appropriate referrals for health education or preventive services given if needed, as noted in patient's After Visit Summary.     History of Present Illness:     Patient presents for a Medicare Wellness Visit    Chief Complaint  Patient presents for follow-up regarding depression, reporting constipation, and management of diabetes.    History of Present Illness  The patient is a known case of depression, currently experiencing stress related to family issues, which exacerbates eating habits. The patient denies loss of appetite but reports stress eating. Additionally, the patient complains of constipation and has a history of diabetes, currently managed with medication. Recent hemoglobin A1c is 6.4%, indicating good glycemic control.  She continues with sxs of depression due to family matters.  Pain in hip with some times burning and numbness.      There is improvement in proteinuria as per last labs.    She also complains of burning in throat, Sleep study did not prove Apnea.       Patient Care Team:  Benjamín Shin MD as PCP - General (Family Medicine)  Greg Jane DO as Endoscopist  RITA Garvey as Nurse Practitioner (Nephrology)  Adilia Mireles MD (Nephrology)     Review of Systems:     Review of Systems   Constitutional:  Positive for unexpected weight change (continue gaining weight, not following life style modification, eating sandwiches  every day.). Negative for diaphoresis, fatigue and fever.   HENT:  Positive for sore throat.    Respiratory:  Negative for cough, chest  tightness, shortness of breath and wheezing.    Cardiovascular:  Negative for chest pain, palpitations and leg swelling.   Gastrointestinal:  Negative for abdominal pain, blood in stool and constipation.   Musculoskeletal:  Positive for myalgias (right thigh pain.).   Neurological:  Negative for dizziness, syncope, light-headedness and headaches.   Hematological:  Does not bruise/bleed easily.   Psychiatric/Behavioral:  Negative for behavioral problems, self-injury and sleep disturbance. The patient is not nervous/anxious.         Problem List:     Patient Active Problem List   Diagnosis    Essential hypertension    Hyperlipidemia    LFT elevation    Chronic low back pain with right-sided sciatica    Uncontrolled type 2 diabetes mellitus with hyperglycemia (HCC)    Diabetic gastroparesis associated with type 2 diabetes mellitus     Acute non-recurrent frontal sinusitis    Needs flu shot    Anxiety    Left foot pain    Morbid obesity with BMI of 45.0-49.9, adult (HCC)    Localized edema    Thoracic spine pain    Acute right-sided low back pain without sciatica    Pain in right upper arm    CKD (chronic kidney disease) stage 1, GFR 90 ml/min or greater    Persistent proteinuria    Depression, recurrent (HCC)    Type 2 diabetes mellitus with microalbuminuria, without long-term current use of insulin     Neuralgia of right thigh    Acute pain of right knee    MAGGIE (obstructive sleep apnea)      Past Medical and Surgical History:     Past Medical History:   Diagnosis Date    Back problem     herniated discs    Chronic pain disorder     back    Depression     Diabetes mellitus (HCC)     Diverticulitis     Diverticulosis     Hyperglycemia     Hyperlipidemia     Hypertension     Impacted cerumen     Obesity     Palpitations      Past Surgical History:   Procedure Laterality Date    CARPAL TUNNEL RELEASE Right 1983    DIAGNOSTIC LAPAROSCOPY      ESOPHAGOGASTRODUODENOSCOPY  10/2018    gastroduodenitis    MAMMO STEREOTACTIC  BREAST BIOPSY LEFT (ALL INC) Left 11/8/2023    PERIPHERAL ANGIOGRAM      ID COLONOSCOPY FLX DX W/COLLJ SPEC WHEN PFRMD N/A 9/12/2018    dr chowdhury, diverticulosis    ID ESOPHAGOGASTRODUODENOSCOPY TRANSORAL DIAGNOSTIC N/A 10/10/2018    Procedure: EGD;  Surgeon: Greg Chowdhury DO;  Location:  [facility];  Service: General      Family History:     Family History   Problem Relation Age of Onset    Kidney disease Mother     Hypertension Mother     Coronary artery disease Father         premature    Diabetes Sister     Coronary artery disease Sister     No Known Problems Daughter     No Known Problems Maternal Grandmother     Coronary artery disease Maternal Grandfather     Diabetes Paternal Grandfather     Coronary artery disease Paternal Grandfather     Breast cancer Paternal Aunt 40      Social History:     Social History     Socioeconomic History    Marital status:      Spouse name: None    Number of children: None    Years of education: None    Highest education level: None   Occupational History    None   Tobacco Use    Smoking status: Never    Smokeless tobacco: Never   Vaping Use    Vaping status: Never Used   Substance and Sexual Activity    Alcohol use: No    Drug use: No    Sexual activity: Yes     Partners: Male   Other Topics Concern    None   Social History Narrative    None     Social Determinants of Health     Financial Resource Strain: Low Risk  (2/21/2024)    Overall Financial Resource Strain (CARDIA)     Difficulty of Paying Living Expenses: Not hard at all   Food Insecurity: Not on file   Transportation Needs: No Transportation Needs (2/21/2024)    PRAPARE - Transportation     Lack of Transportation (Medical): No     Lack of Transportation (Non-Medical): No   Physical Activity: Not on file   Stress: Not on file   Social Connections: Not on file   Intimate Partner Violence: Not on file   Housing Stability: Not on file      Medications and Allergies:     Current Outpatient Medications   Medication Sig  Dispense Refill    atorvastatin (LIPITOR) 40 mg tablet       metFORMIN (GLUCOPHAGE) 500 mg tablet Take 1 tablet (500 mg total) by mouth 2 (two) times a day with meals 180 tablet 3    omeprazole (PriLOSEC) 20 mg delayed release capsule Take 1 capsule (20 mg total) by mouth daily before breakfast 30 capsule 5    semaglutide, 2 mg/dose, (Ozempic) 8 mg/ mL injection pen Inject 0.75 mL (2 mg total) under the skin every 7 days 3 mL 5    Comfort Touch Plus Lancets 30G MISC USE TO TEST THE BLOOD SUGAR EVERY MORNING 100 each 5    ezetimibe (ZETIA) 10 mg tablet TAKE ONE TABLET BY MOUTH ONCE DAILY 30 tablet 5    glucose blood (FREESTYLE LITE) test strip USE TO TEST THE BLOOD SUGAR THREE TIMES A DAY Dx: E11.65 300 strip 3    losartan (COZAAR) 50 mg tablet Take 1 tablet (50 mg total) by mouth 2 (two) times a day 180 tablet 3    NIFEdipine (PROCARDIA XL) 30 mg 24 hr tablet Take 1 tablet (30 mg total) by mouth daily at bedtime 90 tablet 3    traZODone (DESYREL) 50 mg tablet TAKE ONE TABLET BY MOUTH ONCE DAILY AT BEDTIME 90 tablet 2    UltiCare Alcohol Swabs 70 % PADS USE TO CLEAN THE AREAS BEFORE TESTING BLOOD SUGAR OR/AND INJECTING INSULIN AS DIRECTED 100 each 3     No current facility-administered medications for this visit.     Allergies   Allergen Reactions    Farxiga [Dapagliflozin] Angioedema    Vicodin [Hydrocodone-Acetaminophen] Angioedema    Percocet [Oxycodone-Acetaminophen] GI Intolerance    Gabapentin GI Intolerance     vomiting    Lyrica [Pregabalin] GI Intolerance     Stomach ache.    Aspirin GI Intolerance    Diclofenac Sodium Rash      Immunizations:     Immunization History   Administered Date(s) Administered    COVID-19 MODERNA VACC 0.5 ML IM 03/30/2021, 05/03/2021, 12/27/2021, 08/12/2022    Hep B, Adolescent or Pediatric 07/17/2017, 12/11/2017    Hep B, adult 04/09/2018    INFLUENZA 10/08/2012, 12/11/2017, 02/22/2019, 10/22/2019, 10/02/2020, 10/20/2021, 10/07/2022, 11/13/2023    Influenza, injectable,  quadrivalent, preservative free 0.5 mL 11/13/2023    Influenza, recombinant, quadrivalent,injectable, preservative free 02/22/2019, 10/22/2019, 10/02/2020, 10/20/2021    Pneumococcal Conjugate Vaccine 20-valent (Pcv20), Polysace 02/21/2024    Pneumococcal Polysaccharide PPV23 11/23/2018    Td (adult), adsorbed 01/22/2018    Tdap 01/22/2018, 01/22/2018      Health Maintenance:         Topic Date Due    Cervical Cancer Screening  Never done    Breast Cancer Screening: Mammogram  06/07/2024    Colorectal Cancer Screening  09/12/2028    HIV Screening  Completed    Hepatitis C Screening  Completed         Topic Date Due    COVID-19 Vaccine (5 - 2023-24 season) 09/01/2023      Medicare Screening Tests and Risk Assessments:     Tova is here for her Subsequent Wellness visit. Last Medicare Wellness visit information reviewed, patient interviewed and updates made to the record today.      Health Risk Assessment:   Patient rates overall health as fair. Patient feels that their physical health rating is slightly worse. Patient is satisfied with their life. Eyesight was rated as same. Hearing was rated as same. Patient feels that their emotional and mental health rating is same. Patients states they are never, rarely angry. Patient states they are sometimes unusually tired/fatigued. Pain experienced in the last 7 days has been some. Patient's pain rating has been 3/10. Patient states that she has experienced no weight loss or gain in last 6 months.     Depression Screening:   PHQ-9 Score: 6      Fall Risk Screening:   In the past year, patient has experienced: no history of falling in past year      Urinary Incontinence Screening:   Patient has not leaked urine accidently in the last six months.     Home Safety:  Patient has trouble with stairs inside or outside of their home. Patient has working smoke alarms and has working carbon monoxide detector. Home safety hazards include: none.     Nutrition:   Current diet is Diabetic.      Medications:   Patient is currently taking over-the-counter supplements. OTC medications include: see medication list. Patient is able to manage medications.     Activities of Daily Living (ADLs)/Instrumental Activities of Daily Living (IADLs):   Walk and transfer into and out of bed and chair?: Yes  Dress and groom yourself?: Yes    Bathe or shower yourself?: Yes    Feed yourself? Yes  Do your laundry/housekeeping?: Yes  Manage your money, pay your bills and track your expenses?: Yes  Make your own meals?: Yes    Do your own shopping?: Yes    Previous Hospitalizations:   Any hospitalizations or ED visits within the last 12 months?: Yes    How many hospitalizations have you had in the last year?: 1-2    Advance Care Planning:   Living will: No    Durable POA for healthcare: No    Advanced directive: No    Advanced directive counseling given: Yes    Five wishes given: No    Patient declined ACP directive: No    End of Life Decisions reviewed with patient: Yes    Provider agrees with end of life decisions: Yes      Cognitive Screening:   Provider or family/friend/caregiver concerned regarding cognition?: No    PREVENTIVE SCREENINGS      Cardiovascular Screening:    General: Screening Not Indicated and History Lipid Disorder    Due for: Lipid Panel      Diabetes Screening:     General: Screening Not Indicated and History Diabetes    Due for: Blood Glucose      Colorectal Cancer Screening:     General: Screening Current    Due for: FOBT/FIT      Breast Cancer Screening:     General: Screening Current      Cervical Cancer Screening:    General: Screening Not Indicated      Osteoporosis Screening:    General: Risks and Benefits Discussed    Due for: DXA Axial      Abdominal Aortic Aneurysm (AAA) Screening:        General: Screening Not Indicated      Lung Cancer Screening:     General: Screening Not Indicated      Hepatitis C Screening:    General: Screening Current    Hep C Screening Accepted: Yes      Screening,  "Brief Intervention, and Referral to Treatment (SBIRT)    Screening  Typical number of drinks in a day: 0  Typical number of drinks in a week: 0  Interpretation: Low risk drinking behavior.    Single Item Drug Screening:  How often have you used an illegal drug (including marijuana) or a prescription medication for non-medical reasons in the past year? never    Single Item Drug Screen Score: 0  Interpretation: Negative screen for possible drug use disorder    Other Counseling Topics:   Alcohol use counseling, car/seat belt/driving safety, skin self-exam, sunscreen and calcium and vitamin D intake and regular weightbearing exercise.     No results found.     Physical Exam:     /80 (BP Location: Left arm, Patient Position: Sitting, Cuff Size: Standard)   Pulse 92   Temp 98 °F (36.7 °C) (Tympanic)   Resp 16   Ht 4' 3\" (1.295 m)   Wt 82.1 kg (181 lb)   SpO2 98%   BMI 48.93 kg/m²     Physical Exam  Constitutional:       Appearance: She is obese.   HENT:      Head: Normocephalic and atraumatic.      Right Ear: Tympanic membrane normal.      Left Ear: Tympanic membrane normal.      Nose: Nose normal.   Eyes:      Extraocular Movements: Extraocular movements intact.      Conjunctiva/sclera: Conjunctivae normal.      Pupils: Pupils are equal, round, and reactive to light.   Neck:      Vascular: No carotid bruit.   Cardiovascular:      Rate and Rhythm: Normal rate and regular rhythm.      Heart sounds: Normal heart sounds. No murmur heard.  Pulmonary:      Effort: Pulmonary effort is normal. No respiratory distress.      Breath sounds: Normal breath sounds.   Abdominal:      General: Abdomen is flat. Bowel sounds are normal.      Palpations: Abdomen is soft.   Musculoskeletal:         General: Normal range of motion.      Cervical back: Normal range of motion. No rigidity. No muscular tenderness.   Lymphadenopathy:      Cervical: No cervical adenopathy.   Skin:     General: Skin is dry.          Neurological:      " General: No focal deficit present.      Mental Status: She is alert.      Sensory: Sensation is intact.      Motor: Motor function is intact.      Coordination: Coordination is intact.      Gait: Gait abnormal.   Psychiatric:         Mood and Affect: Mood normal.          Benjamín Shin MD

## 2024-02-23 ENCOUNTER — TELEPHONE (OUTPATIENT)
Dept: LAB | Facility: HOSPITAL | Age: 64
End: 2024-02-23

## 2024-03-08 ENCOUNTER — LAB (OUTPATIENT)
Dept: LAB | Facility: HOSPITAL | Age: 64
End: 2024-03-08
Payer: MEDICARE

## 2024-03-08 DIAGNOSIS — E11.29 TYPE 2 DIABETES MELLITUS WITH MICROALBUMINURIA, WITHOUT LONG-TERM CURRENT USE OF INSULIN: ICD-10-CM

## 2024-03-08 DIAGNOSIS — R80.9 TYPE 2 DIABETES MELLITUS WITH MICROALBUMINURIA, WITHOUT LONG-TERM CURRENT USE OF INSULIN: ICD-10-CM

## 2024-03-08 DIAGNOSIS — Z00.00 MEDICARE ANNUAL WELLNESS VISIT, SUBSEQUENT: ICD-10-CM

## 2024-03-08 LAB
BASOPHILS # BLD AUTO: 0.06 THOUSANDS/ÂΜL (ref 0–0.1)
BASOPHILS NFR BLD AUTO: 1 % (ref 0–1)
CHOLEST SERPL-MCNC: 186 MG/DL
EOSINOPHIL # BLD AUTO: 0.2 THOUSAND/ÂΜL (ref 0–0.61)
EOSINOPHIL NFR BLD AUTO: 2 % (ref 0–6)
ERYTHROCYTE [DISTWIDTH] IN BLOOD BY AUTOMATED COUNT: 13.7 % (ref 11.6–15.1)
HCT VFR BLD AUTO: 39.6 % (ref 34.8–46.1)
HDLC SERPL-MCNC: 60 MG/DL
HGB BLD-MCNC: 12.5 G/DL (ref 11.5–15.4)
IMM GRANULOCYTES # BLD AUTO: 0.02 THOUSAND/UL (ref 0–0.2)
IMM GRANULOCYTES NFR BLD AUTO: 0 % (ref 0–2)
LDLC SERPL CALC-MCNC: 108 MG/DL (ref 0–100)
LYMPHOCYTES # BLD AUTO: 2.39 THOUSANDS/ÂΜL (ref 0.6–4.47)
LYMPHOCYTES NFR BLD AUTO: 29 % (ref 14–44)
MCH RBC QN AUTO: 28.9 PG (ref 26.8–34.3)
MCHC RBC AUTO-ENTMCNC: 31.6 G/DL (ref 31.4–37.4)
MCV RBC AUTO: 92 FL (ref 82–98)
MONOCYTES # BLD AUTO: 0.55 THOUSAND/ÂΜL (ref 0.17–1.22)
MONOCYTES NFR BLD AUTO: 7 % (ref 4–12)
NEUTROPHILS # BLD AUTO: 5.06 THOUSANDS/ÂΜL (ref 1.85–7.62)
NEUTS SEG NFR BLD AUTO: 61 % (ref 43–75)
NRBC BLD AUTO-RTO: 0 /100 WBCS
PLATELET # BLD AUTO: 359 THOUSANDS/UL (ref 149–390)
PMV BLD AUTO: 10.5 FL (ref 8.9–12.7)
RBC # BLD AUTO: 4.33 MILLION/UL (ref 3.81–5.12)
TRIGL SERPL-MCNC: 92 MG/DL
WBC # BLD AUTO: 8.28 THOUSAND/UL (ref 4.31–10.16)

## 2024-03-08 PROCEDURE — 80061 LIPID PANEL: CPT

## 2024-03-08 PROCEDURE — 85025 COMPLETE CBC W/AUTO DIFF WBC: CPT

## 2024-03-08 PROCEDURE — 36415 COLL VENOUS BLD VENIPUNCTURE: CPT

## 2024-03-10 NOTE — RESULT ENCOUNTER NOTE
Dear Tova, Labs showed that your cholesterol is still up. Please take the cholesterol-lowering medication every day without missing a day. (Both atorvastatin and Zetia).

## 2024-03-18 DIAGNOSIS — E11.29 TYPE 2 DIABETES MELLITUS WITH MICROALBUMINURIA, WITHOUT LONG-TERM CURRENT USE OF INSULIN (HCC): ICD-10-CM

## 2024-03-18 DIAGNOSIS — R80.9 TYPE 2 DIABETES MELLITUS WITH MICROALBUMINURIA, WITHOUT LONG-TERM CURRENT USE OF INSULIN (HCC): ICD-10-CM

## 2024-03-18 DIAGNOSIS — E11.65 UNCONTROLLED TYPE 2 DIABETES MELLITUS WITH HYPERGLYCEMIA (HCC): ICD-10-CM

## 2024-03-26 DIAGNOSIS — R80.1 PERSISTENT PROTEINURIA: ICD-10-CM

## 2024-03-26 DIAGNOSIS — E55.9 VITAMIN D DEFICIENCY: ICD-10-CM

## 2024-04-04 ENCOUNTER — RA CDI HCC (OUTPATIENT)
Dept: OTHER | Facility: HOSPITAL | Age: 64
End: 2024-04-04

## 2024-04-20 RX ORDER — ERGOCALCIFEROL 1.25 MG/1
CAPSULE ORAL
Qty: 12 CAPSULE | Refills: 7 | Status: SHIPPED | OUTPATIENT
Start: 2024-04-20

## 2024-04-20 RX ORDER — ALCOHOL ANTISEPTIC PADS
PADS, MEDICATED (EA) TOPICAL
Qty: 100 EACH | Refills: 2 | Status: SHIPPED | OUTPATIENT
Start: 2024-04-20

## 2024-04-22 ENCOUNTER — TELEPHONE (OUTPATIENT)
Dept: GASTROENTEROLOGY | Facility: CLINIC | Age: 64
End: 2024-04-22

## 2024-04-22 ENCOUNTER — CONSULT (OUTPATIENT)
Dept: GASTROENTEROLOGY | Facility: CLINIC | Age: 64
End: 2024-04-22
Payer: MEDICARE

## 2024-04-22 ENCOUNTER — PREP FOR PROCEDURE (OUTPATIENT)
Dept: GASTROENTEROLOGY | Facility: CLINIC | Age: 64
End: 2024-04-22

## 2024-04-22 VITALS
WEIGHT: 183 LBS | DIASTOLIC BLOOD PRESSURE: 85 MMHG | OXYGEN SATURATION: 98 % | BODY MASS INDEX: 42.35 KG/M2 | HEART RATE: 84 BPM | SYSTOLIC BLOOD PRESSURE: 130 MMHG | TEMPERATURE: 97.4 F | HEIGHT: 55 IN

## 2024-04-22 DIAGNOSIS — K59.00 CONSTIPATION, UNSPECIFIED CONSTIPATION TYPE: ICD-10-CM

## 2024-04-22 DIAGNOSIS — K76.9 LIVER LESION: Primary | ICD-10-CM

## 2024-04-22 DIAGNOSIS — R10.13 EPIGASTRIC PAIN: ICD-10-CM

## 2024-04-22 PROCEDURE — 99214 OFFICE O/P EST MOD 30 MIN: CPT | Performed by: INTERNAL MEDICINE

## 2024-04-22 RX ORDER — POLYETHYLENE GLYCOL 3350 17 G/17G
17 POWDER, FOR SOLUTION ORAL DAILY
Qty: 510 G | Refills: 0 | Status: SHIPPED | OUTPATIENT
Start: 2024-04-22 | End: 2024-05-22

## 2024-04-22 NOTE — PROGRESS NOTES
[facility] - Outpatient Consultation  Tova Faulkner 63 y.o. female MRN: [id number]  Encounter: [id number]        ASSESSMENT AND PLAN   Patient is a 63 year old female with a past medical history of DM2, HTN, CKD stage 1 who presents for epigastric pain and nausea     1. Liver lesion  Patient noted to have a liver lesion on ultrasound in 2020 measuring 2.3 cms  Appeared benign but will f/u with repeat ultrasound as patient is claustrophic and cannot get an MRI  - US right upper quadrant with liver dopplers; Future    2. Constipation, unspecified constipation type  Patient with constipation and not responding to dulcolax  Advised patient to start miralax daily and increase to BID if not enough improvement seen  She had a colonoscopy in 2018 during which diverticulosis was seen but was otherwise unremarkable   Repeat colonoscopy in 2028  - polyethylene glycol (MIRALAX) 17 g packet; Take 17 g by mouth daily  Dispense: 510 g; Refill: 0    3. Epigastric pain  Patients symptoms could be from gastroparesis  She is due to start ozempic prescribed by her PCP but I do not think she would be a great candidate for this medication given her gastroparesis  We will plan to obtain a gastric emptying study- if this is normal or shows mild gastroparesis, she could give it a try, but if she has moderate to severe gastroparesis, I would advise against ozempic  Also plan for EGD to rule out esophagitis, gastritis, PUD, H pylori  - EGD; Future  - NM gastric emptying; Future    Orders Placed This Encounter   Procedures    US right upper quadrant with liver dopplers    NM gastric emptying    EGD         HISTORY OF PRESENT ILLNESS     Patient is a 63 year old female with a past medical history of DM2, HTN, CKD stage 1 who presents for epigastric pain and nausea.  Patient reports that the symptoms have been chronic.  Her nausea is present after she eats so she has to eat small portions.  She reports constipation as  well which is not responding very well to Dulcolax.  Patient was prescribed Ozempic from her PCP but wanted to get our opinion before she took the medication because she heard that it can worsen her gastroparesis.  Patient denies any dysphagia, weight loss, GI bleeding. She had a colonoscopy in 2018 during which diverticulosis was seen but was otherwise unremarkable         REVIEW OF SYSTEMS     CONSTITUTIONAL: Denies any fever, chills, rigors, and weight loss.  HEENT: No earache or tinnitus. Denies hearing loss or visual disturbances.  CARDIOVASCULAR: No chest pain or palpitations.   RESPIRATORY: Denies any cough, hemoptysis, shortness of breath or dyspnea on exertion.  GASTROINTESTINAL: As noted in the History of Present Illness.   GENITOURINARY: No problems with urination. Denies any hematuria or dysuria.  NEUROLOGIC: No dizziness or vertigo, denies headaches.   MUSCULOSKELETAL: Denies any muscle or joint pain.   SKIN: Denies skin rashes or itching.   ENDOCRINE: Denies excessive thirst. Denies intolerance to heat or cold.  PSYCHOSOCIAL: Denies depression or anxiety. Denies any recent memory loss.       Historical information     Past Medical History:   Diagnosis Date    Back problem     herniated discs    Chronic pain disorder     back    Depression     Diabetes mellitus (HCC)     Diverticulitis     Diverticulosis     Hyperglycemia     Hyperlipidemia     Hypertension     Impacted cerumen     Obesity     Palpitations      Past Surgical History:   Procedure Laterality Date    CARPAL TUNNEL RELEASE Right 1983    DIAGNOSTIC LAPAROSCOPY      ESOPHAGOGASTRODUODENOSCOPY  10/2018    gastroduodenitis    MAMMO STEREOTACTIC BREAST BIOPSY LEFT (ALL INC) Left 11/8/2023    PERIPHERAL ANGIOGRAM      [place] COLONOSCOPY FLX DX W/COLLJ SPEC WHEN PFRMD N/A 9/12/2018    dr chowdhury, diverticulosis    IA ESOPHAGOGASTRODUODENOSCOPY TRANSORAL DIAGNOSTIC N/A 10/10/2018    Procedure: EGD;  Surgeon: Greg Chowdhury DO;  Location:  GI LAB;   "Service: General     Social History   Social History     Substance and Sexual Activity   Alcohol Use No     Social History     Substance and Sexual Activity   Drug Use No     Social History     Tobacco Use   Smoking Status Never   Smokeless Tobacco Never     Family History   Problem Relation Age of Onset    Kidney disease Mother     Hypertension Mother     Coronary artery disease Father         premature    Diabetes Sister     Coronary artery disease Sister     No Known Problems Daughter     No Known Problems Maternal Grandmother     Coronary artery disease Maternal Grandfather     Diabetes Paternal Grandfather     Coronary artery disease Paternal Grandfather     Breast cancer Paternal Aunt 40       Allergies       Current Outpatient Medications:     Comfort Touch Plus Lancets 30G MISC    ergocalciferol (VITAMIN D2) 50,000 units    ezetimibe (ZETIA) 10 mg tablet    glucose blood (FREESTYLE LITE) test strip    losartan (COZAAR) 50 mg tablet    metFORMIN (GLUCOPHAGE) 500 mg tablet    NIFEdipine (PROCARDIA XL) 30 mg 24 hr tablet    omeprazole (PriLOSEC) 20 mg delayed release capsule    polyethylene glycol (MIRALAX) 17 g packet    semaglutide, 2 mg/dose, (Ozempic) 8 mg/ mL injection pen    UltiCare Alcohol Swabs 70 % PADS    atorvastatin (LIPITOR) 40 mg tablet    traZODone (DESYREL) 50 mg tablet    Allergies   Allergen Reactions    Farxiga [Dapagliflozin] Angioedema    Vicodin [Hydrocodone-Acetaminophen] Angioedema    Percocet [Oxycodone-Acetaminophen] GI Intolerance    Gabapentin GI Intolerance     vomiting    Lyrica [Pregabalin] GI Intolerance     Stomach ache.    Aspirin GI Intolerance    Diclofenac Sodium Rash           Objective assessment       Blood pressure 130/85, pulse 84, temperature (!) 97.4 °F (36.3 °C), temperature source Tympanic, height 4' 3\" (1.295 m), weight 83 kg (183 lb), SpO2 98%, not currently breastfeeding. Body mass index is 49.47 kg/m².        PHYSICAL EXAM:         Physical Exam:   GENERAL: " NAD  HEENT:  NC/AT, MMM, no scleral icterus  CARDIAC:  Regular rate and rhythm  PULMONARY:  No respiratory distress, no wheezing/rales/rhonci, non-labored breathing  ABDOMEN:  Soft, NT/ND, +BS, no rebound/guarding/rigidity  Extremities:  No edema, cyanosis, or clubbing  NEUROLOGIC:  Alert/oriented x3.   SKIN:  No rashes or erythema      Lab Results:      No visits with results within 1 Day(s) from this visit.   Latest known visit with results is:   Lab on 03/08/2024   Component Date Value    WBC 03/08/2024 8.28     RBC 03/08/2024 4.33     Hemoglobin 03/08/2024 12.5     Hematocrit 03/08/2024 39.6     MCV 03/08/2024 92     MCH 03/08/2024 28.9     MCHC 03/08/2024 31.6     RDW 03/08/2024 13.7     MPV 03/08/2024 10.5     Platelets 03/08/2024 359     nRBC 03/08/2024 0     Segmented % 03/08/2024 61     Immature Grans % 03/08/2024 0     Lymphocytes % 03/08/2024 29     Monocytes % 03/08/2024 7     Eosinophils Relative 03/08/2024 2     Basophils Relative 03/08/2024 1     Absolute Neutrophils 03/08/2024 5.06     Absolute Immature Grans 03/08/2024 0.02     Absolute Lymphocytes 03/08/2024 2.39     Absolute Monocytes 03/08/2024 0.55     Eosinophils Absolute 03/08/2024 0.20     Basophils Absolute 03/08/2024 0.06     Cholesterol 03/08/2024 186     Triglycerides 03/08/2024 92     HDL, Direct 03/08/2024 60     LDL Calculated 03/08/2024 108 (H)          Radiology Results:      No results found.      Catracho Lundberg MD  Gastroenterology Fellow

## 2024-04-22 NOTE — TELEPHONE ENCOUNTER
Procedure: EGD  Date: 05/22/2024  Physician performing: Dr. Parsons  Location of procedure:    Instructions given to patient: Yes  Diabetic: Yes  Clearances: N/A

## 2024-05-07 ENCOUNTER — HOSPITAL ENCOUNTER (OUTPATIENT)
Dept: ULTRASOUND IMAGING | Facility: HOSPITAL | Age: 64
Discharge: HOME | End: 2024-05-07
Payer: MEDICARE

## 2024-05-07 DIAGNOSIS — K76.9 LIVER LESION: ICD-10-CM

## 2024-05-07 PROCEDURE — 76705 ECHO EXAM OF ABDOMEN: CPT

## 2024-05-21 DIAGNOSIS — F32.9 REACTIVE DEPRESSION: ICD-10-CM

## 2024-05-21 RX ORDER — SODIUM CHLORIDE, SODIUM LACTATE, POTASSIUM CHLORIDE, CALCIUM CHLORIDE 600; 310; 30; 20 MG/100ML; MG/100ML; MG/100ML; MG/100ML
125 INJECTION, SOLUTION INTRAVENOUS CONTINUOUS
Status: CANCELLED | OUTPATIENT
Start: 2024-05-21

## 2024-05-21 RX ORDER — LIDOCAINE HYDROCHLORIDE 10 MG/ML
0.5 INJECTION, SOLUTION EPIDURAL; INFILTRATION; INTRACAUDAL; PERINEURAL ONCE AS NEEDED
Status: CANCELLED | OUTPATIENT
Start: 2024-05-21

## 2024-05-22 ENCOUNTER — HOSPITAL ENCOUNTER (OUTPATIENT)
Dept: GASTROENTEROLOGY | Facility: HOSPITAL | Age: 64
Setting detail: OUTPATIENT SURGERY
Discharge: HOME | End: 2024-05-22
Admitting: INTERNAL MEDICINE
Payer: MEDICARE

## 2024-05-22 ENCOUNTER — ANESTHESIA (OUTPATIENT)
Dept: GASTROENTEROLOGY | Facility: HOSPITAL | Age: 64
End: 2024-05-22

## 2024-05-22 ENCOUNTER — ANESTHESIA EVENT (OUTPATIENT)
Dept: GASTROENTEROLOGY | Facility: HOSPITAL | Age: 64
End: 2024-05-22

## 2024-05-22 VITALS
OXYGEN SATURATION: 92 % | HEART RATE: 84 BPM | SYSTOLIC BLOOD PRESSURE: 121 MMHG | BODY MASS INDEX: 42.35 KG/M2 | HEIGHT: 55 IN | DIASTOLIC BLOOD PRESSURE: 61 MMHG | RESPIRATION RATE: 16 BRPM | WEIGHT: 183 LBS | TEMPERATURE: 98.2 F

## 2024-05-22 DIAGNOSIS — R10.13 EPIGASTRIC PAIN: ICD-10-CM

## 2024-05-22 LAB — GLUCOSE SERPL-MCNC: 125 MG/DL (ref 65–140)

## 2024-05-22 PROCEDURE — 88305 TISSUE EXAM BY PATHOLOGIST: CPT | Performed by: PATHOLOGY

## 2024-05-22 PROCEDURE — 82948 REAGENT STRIP/BLOOD GLUCOSE: CPT

## 2024-05-22 PROCEDURE — 43239 EGD BIOPSY SINGLE/MULTIPLE: CPT | Performed by: INTERNAL MEDICINE

## 2024-05-22 RX ORDER — GLYCOPYRROLATE 0.2 MG/ML
INJECTION INTRAMUSCULAR; INTRAVENOUS AS NEEDED
Status: DISCONTINUED | OUTPATIENT
Start: 2024-05-22 | End: 2024-05-22

## 2024-05-22 RX ORDER — SODIUM CHLORIDE, SODIUM LACTATE, POTASSIUM CHLORIDE, CALCIUM CHLORIDE 600; 310; 30; 20 MG/100ML; MG/100ML; MG/100ML; MG/100ML
INJECTION, SOLUTION INTRAVENOUS CONTINUOUS PRN
Status: DISCONTINUED | OUTPATIENT
Start: 2024-05-22 | End: 2024-05-22

## 2024-05-22 RX ORDER — PROPOFOL 10 MG/ML
INJECTION, EMULSION INTRAVENOUS AS NEEDED
Status: DISCONTINUED | OUTPATIENT
Start: 2024-05-22 | End: 2024-05-22

## 2024-05-22 RX ORDER — SODIUM CHLORIDE, SODIUM LACTATE, POTASSIUM CHLORIDE, CALCIUM CHLORIDE 600; 310; 30; 20 MG/100ML; MG/100ML; MG/100ML; MG/100ML
125 INJECTION, SOLUTION INTRAVENOUS CONTINUOUS
Status: DISCONTINUED | OUTPATIENT
Start: 2024-05-22 | End: 2024-05-26 | Stop reason: HOSPADM

## 2024-05-22 RX ORDER — MIDAZOLAM HYDROCHLORIDE 2 MG/2ML
INJECTION, SOLUTION INTRAMUSCULAR; INTRAVENOUS AS NEEDED
Status: DISCONTINUED | OUTPATIENT
Start: 2024-05-22 | End: 2024-05-22

## 2024-05-22 RX ORDER — LIDOCAINE HYDROCHLORIDE 10 MG/ML
0.5 INJECTION, SOLUTION EPIDURAL; INFILTRATION; INTRACAUDAL; PERINEURAL ONCE AS NEEDED
Status: DISCONTINUED | OUTPATIENT
Start: 2024-05-22 | End: 2024-05-26 | Stop reason: HOSPADM

## 2024-05-22 RX ORDER — TRAZODONE HYDROCHLORIDE 50 MG/1
TABLET ORAL
Qty: 90 TABLET | Refills: 1 | Status: SHIPPED | OUTPATIENT
Start: 2024-05-22

## 2024-05-22 RX ADMIN — MIDAZOLAM 2 MG: 1 INJECTION INTRAMUSCULAR; INTRAVENOUS at 08:53

## 2024-05-22 RX ADMIN — PROPOFOL 50 MG: 10 INJECTION, EMULSION INTRAVENOUS at 08:59

## 2024-05-22 RX ADMIN — DEXMEDETOMIDINE HYDROCHLORIDE 12 MCG: 100 INJECTION, SOLUTION INTRAVENOUS at 08:50

## 2024-05-22 RX ADMIN — SODIUM CHLORIDE, SODIUM LACTATE, POTASSIUM CHLORIDE, AND CALCIUM CHLORIDE: .6; .31; .03; .02 INJECTION, SOLUTION INTRAVENOUS at 08:45

## 2024-05-22 RX ADMIN — PROPOFOL 50 MG: 10 INJECTION, EMULSION INTRAVENOUS at 08:54

## 2024-05-22 RX ADMIN — SODIUM CHLORIDE, SODIUM LACTATE, POTASSIUM CHLORIDE, AND CALCIUM CHLORIDE 125 ML/HR: .6; .31; .03; .02 INJECTION, SOLUTION INTRAVENOUS at 08:24

## 2024-05-22 RX ADMIN — GLYCOPYRROLATE 0.2 MCG: 0.2 INJECTION INTRAMUSCULAR; INTRAVENOUS at 08:49

## 2024-05-22 NOTE — ANESTHESIA PREPROCEDURE EVALUATION
Procedure:  EGD    MAGGIE on CPAP    Relevant Problems   CARDIO   (+) Essential hypertension   (+) Hyperlipidemia      ENDO   (+) Type 2 diabetes mellitus with microalbuminuria, without long-term current use of insulin (HCC)      /RENAL   (+) CKD (chronic kidney disease) stage 1, GFR 90 ml/min or greater      MUSCULOSKELETAL   (+) Acute right-sided low back pain without sciatica   (+) Chronic low back pain with right-sided sciatica   (+) Thoracic spine pain      NEURO/PSYCH   (+) Anxiety   (+) Chronic low back pain with right-sided sciatica   (+) Depression, recurrent (HCC)   (+) Diabetic gastroparesis associated with type 2 diabetes mellitus  (HCC)      PULMONARY   (+) MAGGIE (obstructive sleep apnea)             Anesthesia Plan  ASA Score- 3     Anesthesia Type- IV sedation with anesthesia with ASA Monitors.         Additional Monitors:     Airway Plan:     Comment: Discussed risks/benefits, including medication reactions, awareness, aspiration, and serious/life threatening complications.    Plan to maintain native airway with IVGA, monitored with EtCO2.       Plan Factors-Exercise tolerance (METS): >4 METS.    Chart reviewed.    Patient summary reviewed.      Patient instructed to abstain from smoking on day of procedure. Patient did not smoke on day of surgery.            Induction- intravenous.    Postoperative Plan-         Informed Consent- Anesthetic plan and risks discussed with patient.  I personally reviewed this patient with the CRNA. Discussed and agreed on the Anesthesia Plan with the CRNA..

## 2024-05-22 NOTE — ANESTHESIA POSTPROCEDURE EVALUATION
Post-Op Assessment Note    CV Status:  Stable  Pain Score: 0    Pain management: adequate       Mental Status:  Arousable and sleepy   Hydration Status:  Euvolemic   PONV Controlled:  Controlled   Airway Patency:  Patent  Two or more mitigation strategies used for obstructive sleep apnea   Post Op Vitals Reviewed: Yes    No anethesia notable event occurred.    Staff: CRNA           BP      Temp      Pulse     Resp      SpO2

## 2024-05-22 NOTE — H&P
History and Physical - [facility]  Tova Faulkner 63 y.o. female MRN: [id number]                  HPI: Tova Faulkner is a 63 y.o. year old female who presents for upper endoscopy given history of presumed gastroparesis and epigastric discomfort.      REVIEW OF SYSTEMS: Per the HPI, and otherwise unremarkable.    Historical Information   Past Medical History:   Diagnosis Date    Back problem     herniated discs    Chronic pain disorder     back    Depression     Diabetes mellitus (HCC)     Diverticulitis     Diverticulosis     Hyperglycemia     Hyperlipidemia     Hypertension     Impacted cerumen     Obesity     Palpitations      Past Surgical History:   Procedure Laterality Date    CARPAL TUNNEL RELEASE Right 1983    DIAGNOSTIC LAPAROSCOPY      ESOPHAGOGASTRODUODENOSCOPY  10/2018    gastroduodenitis    MAMMO STEREOTACTIC BREAST BIOPSY LEFT (ALL INC) Left 11/8/2023    PERIPHERAL ANGIOGRAM      [place] COLONOSCOPY FLX DX W/COLLJ SPEC WHEN PFRMD N/A 9/12/2018    dr chowdhury, diverticulosis    MA ESOPHAGOGASTRODUODENOSCOPY TRANSORAL DIAGNOSTIC N/A 10/10/2018    Procedure: EGD;  Surgeon: Greg Chowdhury DO;  Location:  GI LAB;  Service: General     Social History   Social History     Substance and Sexual Activity   Alcohol Use No     Social History     Substance and Sexual Activity   Drug Use No     Social History     Tobacco Use   Smoking Status Never   Smokeless Tobacco Never     Family History   Problem Relation Age of Onset    Kidney disease Mother     Hypertension Mother     Coronary artery disease Father         premature    Diabetes Sister     Coronary artery disease Sister     No Known Problems Daughter     No Known Problems Maternal Grandmother     Coronary artery disease Maternal Grandfather     Diabetes Paternal Grandfather     Coronary artery disease Paternal Grandfather     Breast cancer Paternal Aunt 40       Meds/Allergies       Current Outpatient Medications:     atorvastatin (LIPITOR) 40 mg  tablet    Comfort Touch Plus Lancets 30G MISC    ergocalciferol (VITAMIN D2) 50,000 units    ezetimibe (ZETIA) 10 mg tablet    glucose blood (FREESTYLE LITE) test strip    losartan (COZAAR) 50 mg tablet    metFORMIN (GLUCOPHAGE) 500 mg tablet    NIFEdipine (PROCARDIA XL) 30 mg 24 hr tablet    omeprazole (PriLOSEC) 20 mg delayed release capsule    polyethylene glycol (MIRALAX) 17 g packet    semaglutide, 2 mg/dose, (Ozempic) 8 mg/ mL injection pen    traZODone (DESYREL) 50 mg tablet    UltiCare Alcohol Swabs 70 % PADS    Allergies   Allergen Reactions    Farxiga [Dapagliflozin] Angioedema    Vicodin [Hydrocodone-Acetaminophen] Angioedema    Percocet [Oxycodone-Acetaminophen] GI Intolerance    Gabapentin GI Intolerance     vomiting    Lyrica [Pregabalin] GI Intolerance     Stomach ache.    Aspirin GI Intolerance    Diclofenac Sodium Rash       Objective     There were no vitals taken for this visit.      PHYSICAL EXAM    Gen: NAD  Head: NCAT  CV: RRR  CHEST: Clear  ABD: soft, NT/ND  EXT: no edema      ASSESSMENT/PLAN:  This is a 63 y.o. year old female here for EGD, and she is stable and optimized for her procedure.

## 2024-05-24 PROCEDURE — 88305 TISSUE EXAM BY PATHOLOGIST: CPT | Performed by: PATHOLOGY

## 2024-06-22 DIAGNOSIS — E11.29 TYPE 2 DIABETES MELLITUS WITH MICROALBUMINURIA, WITHOUT LONG-TERM CURRENT USE OF INSULIN (HCC): ICD-10-CM

## 2024-06-22 DIAGNOSIS — K21.9 CHRONIC GERD: ICD-10-CM

## 2024-06-22 DIAGNOSIS — R80.9 TYPE 2 DIABETES MELLITUS WITH MICROALBUMINURIA, WITHOUT LONG-TERM CURRENT USE OF INSULIN (HCC): ICD-10-CM

## 2024-06-22 RX ORDER — ALCOHOL ANTISEPTIC PADS
PADS, MEDICATED (EA) TOPICAL
Qty: 100 EACH | Refills: 5 | Status: SHIPPED | OUTPATIENT
Start: 2024-06-22

## 2024-06-22 RX ORDER — SEMAGLUTIDE 2.68 MG/ML
INJECTION, SOLUTION SUBCUTANEOUS
Qty: 3 ML | Refills: 5 | Status: SHIPPED | OUTPATIENT
Start: 2024-06-22

## 2024-06-22 RX ORDER — OMEPRAZOLE 20 MG/1
CAPSULE, DELAYED RELEASE ORAL
Qty: 30 CAPSULE | Refills: 5 | Status: SHIPPED | OUTPATIENT
Start: 2024-06-22

## 2024-06-24 ENCOUNTER — OFFICE VISIT (OUTPATIENT)
Dept: GASTROENTEROLOGY | Facility: CLINIC | Age: 64
End: 2024-06-24
Payer: MEDICARE

## 2024-06-24 VITALS
BODY MASS INDEX: 43.62 KG/M2 | HEIGHT: 55 IN | DIASTOLIC BLOOD PRESSURE: 82 MMHG | SYSTOLIC BLOOD PRESSURE: 121 MMHG | OXYGEN SATURATION: 97 % | WEIGHT: 188.5 LBS | TEMPERATURE: 97.3 F | HEART RATE: 88 BPM

## 2024-06-24 DIAGNOSIS — K31.84 GASTROPARESIS: Primary | ICD-10-CM

## 2024-06-24 DIAGNOSIS — K76.9 LIVER LESION: ICD-10-CM

## 2024-06-24 DIAGNOSIS — K59.00 CONSTIPATION, UNSPECIFIED CONSTIPATION TYPE: ICD-10-CM

## 2024-06-24 PROCEDURE — 99213 OFFICE O/P EST LOW 20 MIN: CPT | Performed by: INTERNAL MEDICINE

## 2024-06-24 PROCEDURE — G2211 COMPLEX E/M VISIT ADD ON: HCPCS | Performed by: INTERNAL MEDICINE

## 2024-06-24 RX ORDER — POLYETHYLENE GLYCOL 3350 17 G/17G
POWDER, FOR SOLUTION ORAL
COMMUNITY
Start: 2024-04-22

## 2024-06-24 NOTE — PROGRESS NOTES
[facility] - Outpatient Follow-up Note  Tova Faulkner 64 y.o. female MRN: [id number]  Encounter: [id number]          ASSESSMENT AND PLAN:    Tova Faulkner is a 64 y.o. female with hx of T2DM, probable gastroparesis, chronic constipation, epigastric pain, liver lesion presenting for follow up.    Probable Gastroparesis  - Asymptomatic at this time  - Counseled on small frequent meals    Constipation  - Continue PRN miralax    Hepatic Lesion  - Stable, benign appearance  - No follow up imaging indicated    1. Gastroparesis    2. Constipation, unspecified constipation type    3. Liver lesion        No orders of the defined types were placed in this encounter.    ______________________________________________________________________    SUBJECTIVE:    Tova Faulkner is a 64 y.o. female with hx of T2DM, probable gastroparesis, chronic constipation, epigastric pain, liver lesion presenting for follow up.    Last seen by Dr. Lundberg 4/22/24. At that time, given epigastric pain and concern for possible gastroparesis, planned for EGD. This was done 5/22/24 with Dr. Prasons, and showed no mucosal abnormalities. Duodenal and gastric biopsies normal. Gastric empyting study not yet done.     For liver lesion, RUQ U/S w dopplers performed. Stable benign appearing liver lesion, no f/u needed.     Colonoscopy 2018 with diverticulosis, otherwise normal. Started on miralax.     Is experiencing relief of constipation with miralax. Nausea sometimes with PO intake, but no emesis.     REVIEW OF SYSTEMS IS OTHERWISE NEGATIVE.      Historical Information   Past Medical History:   Diagnosis Date    Back problem     herniated discs    Chronic pain disorder     back    Depression     Diabetes mellitus (HCC)     Diverticulitis     Diverticulosis     Hyperglycemia     Hyperlipidemia     Hypertension     Impacted cerumen     Obesity     Palpitations      Past Surgical History:   Procedure Laterality Date    CARPAL TUNNEL  RELEASE Right 1983    DIAGNOSTIC LAPAROSCOPY      ESOPHAGOGASTRODUODENOSCOPY  10/2018    gastroduodenitis    MAMMO STEREOTACTIC BREAST BIOPSY LEFT (ALL INC) Left 11/08/2023    PERIPHERAL ANGIOGRAM      [place] COLONOSCOPY FLX DX W/COLLJ SPEC WHEN PFRMD N/A 09/12/2018    dr chowdhury, diverticulosis    TN ESOPHAGOGASTRODUODENOSCOPY TRANSORAL DIAGNOSTIC N/A 10/10/2018    Procedure: EGD;  Surgeon: Greg Chowdhury DO;  Location:  GI LAB;  Service: General    UPPER GASTROINTESTINAL ENDOSCOPY  05/22/2024     Social History   Social History     Substance and Sexual Activity   Alcohol Use No     Social History     Substance and Sexual Activity   Drug Use No     Social History     Tobacco Use   Smoking Status Never   Smokeless Tobacco Never     Family History   Problem Relation Age of Onset    Kidney disease Mother     Hypertension Mother     Coronary artery disease Father         premature    Diabetes Sister     Coronary artery disease Sister     No Known Problems Daughter     No Known Problems Maternal Grandmother     Coronary artery disease Maternal Grandfather     Diabetes Paternal Grandfather     Coronary artery disease Paternal Grandfather     Breast cancer Paternal Aunt 40       Meds/Allergies       Current Outpatient Medications:     atorvastatin (LIPITOR) 40 mg tablet    Comfort Touch Plus Lancets 30G MISC    ergocalciferol (VITAMIN D2) 50,000 units    ezetimibe (ZETIA) 10 mg tablet    glucose blood (FREESTYLE LITE) test strip    losartan (COZAAR) 50 mg tablet    metFORMIN (GLUCOPHAGE) 500 mg tablet    NIFEdipine (PROCARDIA XL) 30 mg 24 hr tablet    UltiCare Alcohol Swabs 70 % PADS    omeprazole (PriLOSEC) 20 mg delayed release capsule    polyethylene glycol (GLYCOLAX) 17 GM/SCOOP powder    polyethylene glycol (MIRALAX) 17 g packet    semaglutide, 2 mg/dose, (Ozempic, 2 MG/DOSE,) 8 mg/ mL injection pen    traZODone (DESYREL) 50 mg tablet    Allergies   Allergen Reactions    Farxiga [Dapagliflozin] Angioedema    Vicodin  [Hydrocodone-Acetaminophen] Angioedema    Percocet [Oxycodone-Acetaminophen] GI Intolerance    Gabapentin GI Intolerance     vomiting    Lyrica [Pregabalin] GI Intolerance     Stomach ache.    Aspirin GI Intolerance    Diclofenac Sodium Rash           Objective     not currently breastfeeding.   There is no height or weight on file to calculate BMI.  Wt Readings from Last 3 Encounters:   05/22/24 83 kg (183 lb)   04/22/24 83 kg (183 lb)   02/21/24 82.1 kg (181 lb)        PHYSICAL EXAM:      General Appearance:   Alert, cooperative, no distress   HEENT:   Normocephalic, atraumatic, anicteric.     Neck:  Supple, symmetrical, trachea midline   Lungs:   No respiratory distress    Heart::   Regular rate; no murmur, rub, or gallop.   Abdomen:   Soft, non-tender, non-distended; normal bowel sounds; no masses, no organomegaly    Genitalia:   Deferred    Rectal:   Deferred    Extremities:  No cyanosis, clubbing or edema    Pulses:  2+ and symmetric    Skin:  No jaundice, rashes, or lesions    Lymph nodes:  No palpable cervical lymphadenopathy        Lab Results:       Lab Units 01/08/24  0830 09/06/23  0753 04/05/23  0834 02/20/23  0933   SODIUM mmol/L 138 139 137 139   POTASSIUM mmol/L 3.9 4.0 4.3 4.7   CHLORIDE mmol/L 104 104 107 104   CO2 mmol/L 25 24 26 28   BUN mg/dL 11 14 16 15   CREATININE mg/dL 0.68 0.66 0.72 0.64   GLUCOSE RANDOM mg/dL 164* 77 128  --    CALCIUM mg/dL 9.1 9.2 9.2 9.5   PHOSPHORUS mg/dL 4.0 3.5 3.9  --             Lab Units 01/08/24  0830 09/06/23  0753 04/05/23  0834 02/20/23  0933   TOTAL PROTEIN g/dL  --   --   --  8.1   ALBUMIN g/dL 4.2 4.3 3.7 4.4   TOTAL BILIRUBIN mg/dL  --   --   --  0.37   AST U/L  --   --   --  23   ALT U/L  --   --   --  15   ALK PHOS U/L  --   --   --  105           Lab Units 03/08/24  0959 02/20/23  0933   WBC Thousand/uL 8.28 8.49   HEMOGLOBIN g/dL 12.5 13.3   HEMATOCRIT % 39.6 41.2   PLATELETS Thousands/uL 359 341   MCV fL 92 92   MCH pg 28.9 29.7   MCHC g/dL 31.6 32.3  "  RDW % 13.7 13.8   MPV fL 10.5 10.5   NRBC AUTO /100 WBCs 0 0   SEGS PCT % 61 64   IMMATURE GRANULOCYTES % % 0 0   LYMPHO PCT % 29 27   MONO PCT % 7 6   EOS PCT % 2 2   BASOS PCT % 1 1   TOTAL NEUT ABS Thousands/µL 5.06 5.42   IMMATURE GRANULOCYES ABS Thousand/uL 0.02 0.02   LYMPHS ABS Thousands/µL 2.39 2.30   MONOS ABS Thousand/µL 0.55 0.50   EOS ABS Thousand/µL 0.20 0.20   BASOS ABS Thousands/µL 0.06 0.05       No results for input(s): \"IRON\", \"TRANSFERRIN\", \"TRANSFERSAT\", \"FERRITIN\", \"RETIC\" in the last 06170 hours.    No results found for: \"CRP\"    Lab Results   Component Value Date    [id number] 3.180 08/17/2018       Radiology Results:   No results found.    Gastroenterological Procedures:   05/22/24    EGD    Impression:  The esophagus appeared normal.  The stomach appeared normal.  Performed forceps biopsies in the body of the stomach, incisura and antrum  The duodenum appeared normal.  Performed forceps biopsies in the duodenal bulb and 2nd part of the  duodenum    RECOMMENDATION:  Await pathology results    DO Alberto Lara MD  PGY-5 Gastroenterology Fellow  6/24/2024 1:20 PM    "

## 2024-06-25 ENCOUNTER — HOSPITAL ENCOUNTER (OUTPATIENT)
Dept: RADIOLOGY | Facility: HOSPITAL | Age: 64
Discharge: HOME | End: 2024-06-25
Payer: MEDICARE

## 2024-06-25 DIAGNOSIS — R10.13 EPIGASTRIC PAIN: ICD-10-CM

## 2024-06-25 PROCEDURE — 78264 GASTRIC EMPTYING IMG STUDY: CPT

## 2024-06-25 PROCEDURE — A9541 TC99M SULFUR COLLOID: HCPCS

## 2024-06-27 ENCOUNTER — NURSE TRIAGE (OUTPATIENT)
Age: 64
End: 2024-06-27

## 2024-06-27 ENCOUNTER — TELEPHONE (OUTPATIENT)
Age: 64
End: 2024-06-27

## 2024-06-27 NOTE — TELEPHONE ENCOUNTER
Patient calling in to because her gastric emptying study is resulted and she would like to know if she can start her medications again including her Ozempic.      Reason for Disposition   Nursing judgment    Answer Assessment - Initial Assessment Questions  1. REASON FOR CALL or QUESTION: Patient calling in to because her gastric emptying study is resulted and she would like to know if she can start her medications again including her Ozempic.    Protocols used: Information Only Call - No Triage-ADULT-[place]

## 2024-06-28 DIAGNOSIS — E78.2 MIXED HYPERLIPIDEMIA: ICD-10-CM

## 2024-06-29 RX ORDER — EZETIMIBE 10 MG/1
10 TABLET ORAL DAILY
Qty: 30 TABLET | Refills: 5 | Status: SHIPPED | OUTPATIENT
Start: 2024-06-29

## 2024-07-01 ENCOUNTER — TELEPHONE (OUTPATIENT)
Dept: GASTROENTEROLOGY | Facility: CLINIC | Age: 64
End: 2024-07-01

## 2024-07-01 NOTE — TELEPHONE ENCOUNTER
Called pt regarding normal gastric emptying study. Pt requesting to restart ozempic. Counseled pt that in setting of no gastroparesis found on study, reasonable to resume this medication but cautioned that if her symptoms worsen, should discontinue this again. Will set up f/u office appointment to discuss need for additional testing/monitor pt's abdominal pain.     Alberto Meade MD  PGY-6 Gastroenterology Fellow

## 2024-07-03 ENCOUNTER — APPOINTMENT (OUTPATIENT)
Dept: LAB | Facility: HOSPITAL | Age: 64
End: 2024-07-03
Payer: MEDICARE

## 2024-07-03 DIAGNOSIS — I10 ESSENTIAL HYPERTENSION: ICD-10-CM

## 2024-07-03 DIAGNOSIS — N18.1 CKD (CHRONIC KIDNEY DISEASE) STAGE 1, GFR 90 ML/MIN OR GREATER: ICD-10-CM

## 2024-07-03 DIAGNOSIS — R80.9 TYPE 2 DIABETES MELLITUS WITH MICROALBUMINURIA, WITHOUT LONG-TERM CURRENT USE OF INSULIN (HCC): ICD-10-CM

## 2024-07-03 DIAGNOSIS — E78.5 HYPERLIPIDEMIA, UNSPECIFIED HYPERLIPIDEMIA TYPE: ICD-10-CM

## 2024-07-03 DIAGNOSIS — E55.9 VITAMIN D DEFICIENCY: ICD-10-CM

## 2024-07-03 DIAGNOSIS — E11.43 DIABETIC GASTROPARESIS ASSOCIATED WITH TYPE 2 DIABETES MELLITUS  (HCC): ICD-10-CM

## 2024-07-03 DIAGNOSIS — E11.29 TYPE 2 DIABETES MELLITUS WITH MICROALBUMINURIA, WITHOUT LONG-TERM CURRENT USE OF INSULIN (HCC): ICD-10-CM

## 2024-07-03 DIAGNOSIS — E66.01 MORBID OBESITY WITH BMI OF 45.0-49.9, ADULT (HCC): ICD-10-CM

## 2024-07-03 DIAGNOSIS — K31.84 DIABETIC GASTROPARESIS ASSOCIATED WITH TYPE 2 DIABETES MELLITUS  (HCC): ICD-10-CM

## 2024-07-03 DIAGNOSIS — R80.1 PERSISTENT PROTEINURIA: ICD-10-CM

## 2024-07-03 LAB
25(OH)D3 SERPL-MCNC: 21.8 NG/ML (ref 30–100)
ALBUMIN SERPL BCG-MCNC: 4.5 G/DL (ref 3.5–5)
ANION GAP SERPL CALCULATED.3IONS-SCNC: 16 MMOL/L (ref 4–13)
BUN SERPL-MCNC: 13 MG/DL (ref 5–25)
CALCIUM SERPL-MCNC: 9.9 MG/DL (ref 8.4–10.2)
CHLORIDE SERPL-SCNC: 99 MMOL/L (ref 96–108)
CO2 SERPL-SCNC: 23 MMOL/L (ref 21–32)
CREAT SERPL-MCNC: 0.66 MG/DL (ref 0.6–1.3)
GFR SERPL CREATININE-BSD FRML MDRD: 92 ML/MIN/1.73SQ M
GLUCOSE SERPL-MCNC: 74 MG/DL (ref 65–140)
PHOSPHATE SERPL-MCNC: 4.4 MG/DL (ref 2.3–4.1)
POTASSIUM SERPL-SCNC: 4.1 MMOL/L (ref 3.5–5.3)
SODIUM SERPL-SCNC: 138 MMOL/L (ref 135–147)

## 2024-07-03 PROCEDURE — 36415 COLL VENOUS BLD VENIPUNCTURE: CPT

## 2024-07-03 PROCEDURE — 80069 RENAL FUNCTION PANEL: CPT

## 2024-07-03 PROCEDURE — 82306 VITAMIN D 25 HYDROXY: CPT

## 2024-07-09 ENCOUNTER — TELEPHONE (OUTPATIENT)
Age: 64
End: 2024-07-09

## 2024-07-09 DIAGNOSIS — N18.1 CKD (CHRONIC KIDNEY DISEASE) STAGE 1, GFR 90 ML/MIN OR GREATER: Primary | ICD-10-CM

## 2024-07-09 NOTE — TELEPHONE ENCOUNTER
I spoke to whitney she is aware o follow lo phos diet and will repeat BMP in 2 weeks. Diet sent through my chart.

## 2024-07-10 ENCOUNTER — TELEPHONE (OUTPATIENT)
Dept: LAB | Facility: HOSPITAL | Age: 64
End: 2024-07-10

## 2024-07-15 NOTE — RESULT ENCOUNTER NOTE
Informed with the patient that she is consistent with her vitamin D weekly as her levels are running low again  Please let the patient know that most recent lab work in terms of renal parameters are stable.    Will discuss further at the upcoming visit, let me know if they have any questions or concerns.    Thanks

## 2024-07-17 ENCOUNTER — LAB (OUTPATIENT)
Dept: LAB | Facility: HOSPITAL | Age: 64
End: 2024-07-17
Attending: INTERNAL MEDICINE
Payer: MEDICARE

## 2024-07-17 ENCOUNTER — TELEPHONE (OUTPATIENT)
Dept: NEPHROLOGY | Facility: CLINIC | Age: 64
End: 2024-07-17

## 2024-07-17 DIAGNOSIS — N18.1 CKD (CHRONIC KIDNEY DISEASE) STAGE 1, GFR 90 ML/MIN OR GREATER: ICD-10-CM

## 2024-07-17 DIAGNOSIS — R80.1 PERSISTENT PROTEINURIA: ICD-10-CM

## 2024-07-17 DIAGNOSIS — E11.43 DIABETIC GASTROPARESIS ASSOCIATED WITH TYPE 2 DIABETES MELLITUS  (HCC): ICD-10-CM

## 2024-07-17 DIAGNOSIS — E66.01 MORBID OBESITY WITH BMI OF 45.0-49.9, ADULT (HCC): ICD-10-CM

## 2024-07-17 DIAGNOSIS — N18.1 CKD (CHRONIC KIDNEY DISEASE) STAGE 1, GFR 90 ML/MIN OR GREATER: Primary | ICD-10-CM

## 2024-07-17 DIAGNOSIS — E11.29 TYPE 2 DIABETES MELLITUS WITH MICROALBUMINURIA, WITHOUT LONG-TERM CURRENT USE OF INSULIN (HCC): ICD-10-CM

## 2024-07-17 DIAGNOSIS — E78.5 HYPERLIPIDEMIA, UNSPECIFIED HYPERLIPIDEMIA TYPE: ICD-10-CM

## 2024-07-17 DIAGNOSIS — R80.9 TYPE 2 DIABETES MELLITUS WITH MICROALBUMINURIA, WITHOUT LONG-TERM CURRENT USE OF INSULIN (HCC): ICD-10-CM

## 2024-07-17 DIAGNOSIS — K31.84 DIABETIC GASTROPARESIS ASSOCIATED WITH TYPE 2 DIABETES MELLITUS  (HCC): ICD-10-CM

## 2024-07-17 DIAGNOSIS — I10 ESSENTIAL HYPERTENSION: ICD-10-CM

## 2024-07-17 LAB
ANION GAP SERPL CALCULATED.3IONS-SCNC: 13 MMOL/L (ref 4–13)
BUN SERPL-MCNC: 11 MG/DL (ref 5–25)
CALCIUM SERPL-MCNC: 9.6 MG/DL (ref 8.4–10.2)
CHLORIDE SERPL-SCNC: 107 MMOL/L (ref 96–108)
CO2 SERPL-SCNC: 23 MMOL/L (ref 21–32)
CREAT SERPL-MCNC: 0.7 MG/DL (ref 0.6–1.3)
GFR SERPL CREATININE-BSD FRML MDRD: 91 ML/MIN/1.73SQ M
GLUCOSE SERPL-MCNC: 78 MG/DL (ref 65–140)
POTASSIUM SERPL-SCNC: 3.9 MMOL/L (ref 3.5–5.3)
SODIUM SERPL-SCNC: 143 MMOL/L (ref 135–147)

## 2024-07-17 PROCEDURE — 36415 COLL VENOUS BLD VENIPUNCTURE: CPT

## 2024-07-17 PROCEDURE — 80048 BASIC METABOLIC PNL TOTAL CA: CPT

## 2024-07-17 NOTE — TELEPHONE ENCOUNTER
----- Message from Adilia Mireles MD sent at 7/17/2024  3:02 PM EDT -----  Please let the patient know that most recent lab work in terms of renal parameters are stable.    Will discuss further at the upcoming visit, let me know if they have any questions or concerns.    Thanks

## 2024-07-24 NOTE — TELEPHONE ENCOUNTER
I spoke to Tova she is aware to contact PCP about her concerns in regards to palpitations. She will continue on low  phos diet for now. I will ad RFP and Phos lab slip in epic for 6 months.

## 2024-07-24 NOTE — TELEPHONE ENCOUNTER
Patient called back, informed her of message.  Patient said she thought she was rechecking phosphorus level but not included in BMP.  Please advise when patient should recheck the renal function panel? Patient reports she has been following a low phosphorus diet for the last 2 weeks. Patient then asks if high phosphorus can cause palpitations?  Patient states for the last week or so she has been having palpitations.  Denies cp or sob. Patient has not yet discussed this with PCP, but said she has an upcoming appointment. RN advised patient to also make PCP aware.  Patient also reports that she recently started Ozempic and lost 10 lbs.      Please call patient and let her know when she should repeat labs again.

## 2024-07-26 DIAGNOSIS — E11.9 TYPE 2 DIABETES MELLITUS WITHOUT COMPLICATION, UNSPECIFIED WHETHER LONG TERM INSULIN USE (HCC): ICD-10-CM

## 2024-07-28 RX ORDER — LANCETS 30 GAUGE
EACH MISCELLANEOUS
Qty: 100 EACH | Refills: 1 | Status: SHIPPED | OUTPATIENT
Start: 2024-07-28

## 2024-07-29 ENCOUNTER — HOSPITAL ENCOUNTER (OUTPATIENT)
Dept: NEUROLOGY | Facility: CLINIC | Age: 64
Discharge: HOME | End: 2024-07-29
Payer: MEDICARE

## 2024-07-29 DIAGNOSIS — M79.651 PAIN OF RIGHT THIGH: ICD-10-CM

## 2024-07-29 PROBLEM — M54.16 RADICULOPATHY, LUMBAR REGION: Status: ACTIVE | Noted: 2024-07-29

## 2024-07-29 PROCEDURE — 95886 MUSC TEST DONE W/N TEST COMP: CPT | Performed by: PSYCHIATRY & NEUROLOGY

## 2024-07-29 PROCEDURE — 95912 NRV CNDJ TEST 11-12 STUDIES: CPT | Performed by: PSYCHIATRY & NEUROLOGY

## 2024-08-27 ENCOUNTER — RA CDI HCC (OUTPATIENT)
Dept: OTHER | Facility: HOSPITAL | Age: 64
End: 2024-08-27

## 2024-09-10 ENCOUNTER — OFFICE VISIT (OUTPATIENT)
Dept: FAMILY MEDICINE CLINIC | Facility: CLINIC | Age: 64
End: 2024-09-10
Payer: MEDICARE

## 2024-09-10 ENCOUNTER — LAB (OUTPATIENT)
Dept: LAB | Facility: HOSPITAL | Age: 64
End: 2024-09-10
Payer: MEDICARE

## 2024-09-10 VITALS
DIASTOLIC BLOOD PRESSURE: 70 MMHG | HEART RATE: 92 BPM | TEMPERATURE: 97.9 F | RESPIRATION RATE: 16 BRPM | WEIGHT: 174 LBS | HEIGHT: 55 IN | OXYGEN SATURATION: 98 % | SYSTOLIC BLOOD PRESSURE: 120 MMHG | BODY MASS INDEX: 40.27 KG/M2

## 2024-09-10 DIAGNOSIS — E11.65 UNCONTROLLED TYPE 2 DIABETES MELLITUS WITH HYPERGLYCEMIA (HCC): Primary | ICD-10-CM

## 2024-09-10 DIAGNOSIS — I10 ESSENTIAL HYPERTENSION: ICD-10-CM

## 2024-09-10 DIAGNOSIS — Z12.31 BREAST CANCER SCREENING BY MAMMOGRAM: ICD-10-CM

## 2024-09-10 DIAGNOSIS — E55.9 VITAMIN D DEFICIENCY: ICD-10-CM

## 2024-09-10 DIAGNOSIS — G89.29 CHRONIC RIGHT-SIDED LOW BACK PAIN WITH RIGHT-SIDED SCIATICA: ICD-10-CM

## 2024-09-10 DIAGNOSIS — Z78.0 MENOPAUSE: ICD-10-CM

## 2024-09-10 DIAGNOSIS — Z23 NEED FOR SHINGLES VACCINE: ICD-10-CM

## 2024-09-10 DIAGNOSIS — M54.41 CHRONIC RIGHT-SIDED LOW BACK PAIN WITH RIGHT-SIDED SCIATICA: ICD-10-CM

## 2024-09-10 DIAGNOSIS — R80.1 PERSISTENT PROTEINURIA: ICD-10-CM

## 2024-09-10 DIAGNOSIS — E78.2 MIXED HYPERLIPIDEMIA: ICD-10-CM

## 2024-09-10 DIAGNOSIS — E83.39 HYPERPHOSPHATEMIA: ICD-10-CM

## 2024-09-10 LAB
ALBUMIN SERPL BCG-MCNC: 4.7 G/DL (ref 3.5–5)
ALP SERPL-CCNC: 67 U/L (ref 34–104)
ALT SERPL W P-5'-P-CCNC: 17 U/L (ref 7–52)
ANION GAP SERPL CALCULATED.3IONS-SCNC: 11 MMOL/L (ref 4–13)
AST SERPL W P-5'-P-CCNC: 15 U/L (ref 13–39)
BILIRUB SERPL-MCNC: 0.36 MG/DL (ref 0.2–1)
BUN SERPL-MCNC: 10 MG/DL (ref 5–25)
CALCIUM SERPL-MCNC: 9.8 MG/DL (ref 8.4–10.2)
CHLORIDE SERPL-SCNC: 104 MMOL/L (ref 96–108)
CO2 SERPL-SCNC: 26 MMOL/L (ref 21–32)
CREAT SERPL-MCNC: 0.71 MG/DL (ref 0.6–1.3)
GFR SERPL CREATININE-BSD FRML MDRD: 89 ML/MIN/1.73SQ M
GLUCOSE P FAST SERPL-MCNC: 110 MG/DL (ref 65–99)
PHOSPHATE SERPL-MCNC: 3.5 MG/DL (ref 2.3–4.1)
POTASSIUM SERPL-SCNC: 4.1 MMOL/L (ref 3.5–5.3)
PROT SERPL-MCNC: 7.8 G/DL (ref 6.4–8.4)
PTH-INTACT SERPL-MCNC: 79.4 PG/ML (ref 12–88)
SL AMB POCT HEMOGLOBIN AIC: 6.4 (ref ?–6.5)
SODIUM SERPL-SCNC: 141 MMOL/L (ref 135–147)

## 2024-09-10 PROCEDURE — 83970 ASSAY OF PARATHORMONE: CPT

## 2024-09-10 PROCEDURE — 36415 COLL VENOUS BLD VENIPUNCTURE: CPT

## 2024-09-10 PROCEDURE — 80053 COMPREHEN METABOLIC PANEL: CPT

## 2024-09-10 PROCEDURE — 84100 ASSAY OF PHOSPHORUS: CPT

## 2024-09-10 PROCEDURE — 83036 HEMOGLOBIN GLYCOSYLATED A1C: CPT | Performed by: FAMILY MEDICINE

## 2024-09-10 PROCEDURE — 99215 OFFICE O/P EST HI 40 MIN: CPT | Performed by: FAMILY MEDICINE

## 2024-09-10 RX ORDER — EZETIMIBE 10 MG/1
10 TABLET ORAL DAILY
Qty: 100 TABLET | Refills: 3 | Status: SHIPPED | OUTPATIENT
Start: 2024-09-10

## 2024-09-10 RX ORDER — ERGOCALCIFEROL 1.25 MG/1
50000 CAPSULE, LIQUID FILLED ORAL WEEKLY
Qty: 12 CAPSULE | Refills: 7 | Status: SHIPPED | OUTPATIENT
Start: 2024-09-10

## 2024-09-10 RX ORDER — ATORVASTATIN CALCIUM 40 MG/1
40 TABLET, FILM COATED ORAL DAILY
Qty: 100 TABLET | Refills: 5 | Status: SHIPPED | OUTPATIENT
Start: 2024-09-10

## 2024-09-10 RX ORDER — NIFEDIPINE 30 MG/1
30 TABLET, EXTENDED RELEASE ORAL
Qty: 100 TABLET | Refills: 3 | Status: SHIPPED | OUTPATIENT
Start: 2024-09-10

## 2024-09-10 RX ORDER — LOSARTAN POTASSIUM 50 MG/1
50 TABLET ORAL 2 TIMES DAILY
Qty: 200 TABLET | Refills: 3 | Status: SHIPPED | OUTPATIENT
Start: 2024-09-10

## 2024-09-10 RX ORDER — ZOSTER VACCINE RECOMBINANT, ADJUVANTED 50 MCG/0.5
0.5 KIT INTRAMUSCULAR ONCE
Qty: 1 EACH | Refills: 1 | Status: SHIPPED | OUTPATIENT
Start: 2024-09-10 | End: 2024-09-10

## 2024-09-10 NOTE — PROGRESS NOTES
Pre-operative Clearance  Name: Tova Faulkner      : 1960      MRN: [id number]  Encounter Provider: Benjamín Shin MD  Encounter Date: 9/10/2024   Encounter department: [facility]    Assessment & Plan  Uncontrolled type 2 diabetes mellitus with hyperglycemia (HCC)    Lab Results   Component Value Date    HGBA1C 6.4 2024       Orders:    POCT hemoglobin A1c    Breast cancer screening by mammogram    Orders:    Mammo screening bilateral w 3d and cad; Future    Chronic right-sided low back pain with right-sided sciatica         Menopause    Orders:    DXA bone density spine hip and pelvis; Future    Need for shingles vaccine    Orders:    Zoster Vac Recomb Adjuvanted (Shingrix) 50 MCG/0.5ML SUSR; Inject 0.5 mL into a muscle once for 1 dose Repeat dose in 2 to 6 months    Pre-operative Clearance:     Medication Instructions:   - ACE Inhibitors or ARBs: Continue this medication up to the evening before surgery/procedure, but do not take the morning of the day of surgery.  - Alpha Adrenergic Antagonist (ie, trazodone, viibryd, trintellix): Continue to take this medication on your normal schedule.  - Antidepressants: Continue to take this medication on your normal schedule.  - Calcium channel blockers: Continue to take this medication on your normal schedule.  - Hyperlipidemia meds: Continue to take this medication on your normal schedule.      Medicine Instructions for Adults with Diabetes (NO Bowel Prep)    Follow these instructions when a BOWEL PREP is NOT required for your procedure or surgery!    NOTE:  GLP Agonists taken weekly: do not take in the 7 days before your procedure. **Bariatric surgery: do not take 4 weeks prior to your procedure.    SGLT-2 Inhibitors: do not take in the 4 days before your procedure    On the Day Before Surgery/Procedure  If you are having a procedure (e.g., Colonoscopy) or surgery which DOES NOT require a bowel prep, follow the  directions below based on the type of medicine you take for your diabetes.  Type of Medicine You Take Examples What to Do   Pre-Mixed Insulin Intermediate  Jnknumh75/25, Fvqjlon31/30, Novolog 70/30, Regular Insulin Take 1/2 your regular dose the evening before our procedure.   Rapid/Fast Acting  Insulin and/or Long-Acting Insulin Humalog U200, NovoLog, Apidra,  Lantus, Levemir, Tresiba, Toujeo,  Fias, Basaglar Take your FULL regular dose the day before procedure.   Oral Diabetic Medicines (sulfonylurea) Glipizide/Glimepiride/  Glucotrol Take your regular dose with dinner the evening before your procedure.   Other Oral Diabetic Medicines Metformin, Glucophage, Glucophage  XR, Riomet, Glumetza, Actose,  Avandia, Gl set, Prandin Take your regular dose with dinner the evening before your procedure   GLP Agonists Adlyxin, Byetta, Bydureon,  Ozempic, Soliqua, Tanzeum,  Trulicity, Victoza, Saxenda,  Rybelsus, Wegovy, Mounjaro, Zepbound If taken daily, take as normal  If taken weekly, do not take this medicine for 7 days before your procedure including the day of the procedure (resume taking after the procedure). **Bariatric surgery: do not take 4 weeks prior to procedure   SGLT-2 Inhibitors Jardiance, Invokana, Farxiga, Steglatro, Brenzavvy, Qtern, Segluromet Glyxambi, Synjardy, Synjardy XR, Invokamet, InvokametXR, Trijary XR, Xigduo X Do not take for 4 days before your procedure including the day of the procedure (resume taking after the procedure)   This educational material has been approved by the Patient Education Advisory Committee.    On the Day of Surgery/Procedure  Follow the directions below based on the type of medicine you take for your diabetes.  Type of Medicine You Take  Examples What to Do   Long-Acting Insulin Lantus, Levemir, Tresiba,  Toujeo, Basaglar, Semglee If you normally take your Long Acting Insulin in the morning, take the full dose as scheduled.   GLP-I Agonists Adlyxin, Byetta,  Bydureon,  Ozempic, Soliqua, Tanzeum,  Trulicity, Victoza, Saxenda,  Rybelsus, Mounjaro Do NOT take this medicine on the day of your procedure (resume taking after the procedure)   Except for the morning Long-Acting Insulin, DO NOT take ANY diabetic medicine on the day of your procedure unless you were instructed by the doctor who manages your diabetes medicines.  Continue to check your blood sugars.  If you have an insulin pump, ask your endocrinologist for instructions at least 3 days before your procedure. NOTE: If you are not able to ask your endocrinologist in advance, on the day of the procedure set your insulin pump to your basal rate only. Bring your insulin pump supplies to the hospital.         History of Present Illness   {Disappearing Hyperlinks I Encounters * My Last Note * Since Last Visit * History :60139}  HPI  Review of Systems  Past Medical History   Past Medical History:   Diagnosis Date    Back problem     herniated discs    Chronic pain disorder     back    Depression     Diabetes mellitus (HCC)     Diverticulitis     Diverticulosis     Hyperglycemia     Hyperlipidemia     Hypertension     Impacted cerumen     Obesity     Palpitations      Past Surgical History:   Procedure Laterality Date    CARPAL TUNNEL RELEASE Right 1983    DIAGNOSTIC LAPAROSCOPY      ESOPHAGOGASTRODUODENOSCOPY  10/2018    gastroduodenitis    MAMMO STEREOTACTIC BREAST BIOPSY LEFT (ALL INC) Left 11/08/2023    PERIPHERAL ANGIOGRAM      [facility] COLONOSCOPY FLX DX W/COLLJ SPEC WHEN PFRMD N/A 09/12/2018    dr chowdhury, diverticulosis    VA ESOPHAGOGASTRODUODENOSCOPY TRANSORAL DIAGNOSTIC N/A 10/10/2018    Procedure: EGD;  Surgeon: Greg Chowdhury DO;  Location:  GI LAB;  Service: General    UPPER GASTROINTESTINAL ENDOSCOPY  05/22/2024     Family History   Problem Relation Age of Onset    Kidney disease Mother     Hypertension Mother     Coronary artery disease Father         premature    Diabetes Sister     Coronary artery disease Sister      No Known Problems Daughter     No Known Problems Maternal Grandmother     Coronary artery disease Maternal Grandfather     Diabetes Paternal Grandfather     Coronary artery disease Paternal Grandfather     Breast cancer Paternal Aunt 40     Social History     Tobacco Use    Smoking status: Never    Smokeless tobacco: Never   Vaping Use    Vaping status: Never Used   Substance and Sexual Activity    Alcohol use: No    Drug use: No    Sexual activity: Not Currently     Partners: Male     Current Outpatient Medications on File Prior to Visit   Medication Sig    atorvastatin (LIPITOR) 40 mg tablet     Comfort Touch Plus Lancets 30G MISC USE TO TEST THE BLOOD SUGAR EVERY MORNING    ergocalciferol (VITAMIN D2) 50,000 units TAKE ONE CAPSULE BY MOUTH ONCE EVERY WEEK    ezetimibe (ZETIA) 10 mg tablet TAKE ONE TABLET BY MOUTH ONCE DAILY    glucose blood (FREESTYLE LITE) test strip USE TO TEST THE BLOOD SUGAR THREE TIMES A DAY Dx: E11.65    losartan (COZAAR) 50 mg tablet Take 1 tablet (50 mg total) by mouth 2 (two) times a day    metFORMIN (GLUCOPHAGE) 500 mg tablet TAKE ONE TABLET BY MOUTH TWICE A DAY WITH MEALS    NIFEdipine (PROCARDIA XL) 30 mg 24 hr tablet Take 1 tablet (30 mg total) by mouth daily at bedtime    omeprazole (PriLOSEC) 20 mg delayed release capsule TAKE ONE CAPSULE BY MOUTH BEFORE BREAKFAST ONCE DAILY (Patient not taking: Reported on 6/24/2024)    polyethylene glycol (GLYCOLAX) 17 GM/SCOOP powder  (Patient not taking: Reported on 6/24/2024)    polyethylene glycol (MIRALAX) 17 g packet Take 17 g by mouth daily    semaglutide, 2 mg/dose, (Ozempic, 2 MG/DOSE,) 8 mg/ mL injection pen INJECT 0.75 ML SUBCUTANEOUSLY ONCE EVERY WEEK (Patient not taking: Reported on 6/24/2024)    traZODone (DESYREL) 50 mg tablet TAKE ONE TABLET BY MOUTH ONCE DAILY AT BEDTIME (Patient not taking: Reported on 6/24/2024)    UltiCare Alcohol Swabs 70 % PADS USE TO CLEAN THE AREAS BEFORE TESTING BLOOD SUGAR OR/AND INJECTING INSULIN  "THREE TIMES A DAY AS DIRECTED    [DISCONTINUED] carvedilol (COREG) 12.5 mg tablet Take 1 tablet (12.5 mg total) by mouth 2 (two) times a day with meals     Allergies   Allergen Reactions    Farxiga [Dapagliflozin] Angioedema    Vicodin [Hydrocodone-Acetaminophen] Angioedema    Percocet [Oxycodone-Acetaminophen] GI Intolerance    Gabapentin GI Intolerance     vomiting    Lyrica [Pregabalin] GI Intolerance     Stomach ache.    Aspirin GI Intolerance    Diclofenac Sodium Rash     Objective   {Disappearing Hyperlinks   Review Vitals * Enter New Vitals * Results Review * Labs * Imaging * Cardiology * Procedures * Lung Cancer Screening * Surgical eConsent :55303}  /70 (BP Location: Left arm, Patient Position: Sitting, Cuff Size: Standard)   Pulse 92   Temp 97.9 °F (36.6 °C) (Tympanic)   Resp 16   Ht 4' 3\" (1.295 m)   Wt 78.9 kg (174 lb)   SpO2 98%   BMI 47.03 kg/m²     Physical Exam  {Administrative / Billing Section (Optional):72335}    Benjamín Shin MD  "

## 2024-09-10 NOTE — ASSESSMENT & PLAN NOTE
6. Hypertension management: Continue Nifedipine 30 mg  and losartan 50 mg daily. Monitor blood pressure regularly and adjust medication as needed.    Orders:    losartan (COZAAR) 50 mg tablet; Take 1 tablet (50 mg total) by mouth 2 (two) times a day    NIFEdipine (PROCARDIA XL) 30 mg 24 hr tablet; Take 1 tablet (30 mg total) by mouth daily at bedtime

## 2024-09-10 NOTE — ASSESSMENT & PLAN NOTE
Routine health maintenance: Schedule bone density scan, mammogram, and blood tests including phosphorus, liver, and kidney function tests. Administer flu vaccine in November and ensure other vaccinations are up to date.  Orders:    DXA bone density spine hip and pelvis; Future

## 2024-09-10 NOTE — ASSESSMENT & PLAN NOTE
Continue care with Nephrologist. The importance of controlling hypertension and diabetes was discussed with patient in order to control proteinuria

## 2024-09-10 NOTE — ASSESSMENT & PLAN NOTE
Lab Results   Component Value Date    HGBA1C 6.4 09/10/2024     Diabetes management: Continue current regimen of metformin 2 times a day and Semaglutide 2 mg weekly. Ensure regular blood glucose monitoring and  report back to office. follow-up in 6 months with me.    Orders:    POCT hemoglobin A1c    Comprehensive metabolic panel; Future    metFORMIN (GLUCOPHAGE) 500 mg tablet; Take 1 tablet (500 mg total) by mouth 2 (two) times a day with meals    semaglutide, 2 mg/dose, (Ozempic, 2 MG/DOSE,) 8 mg/ mL injection pen; Inject 0.75 mL (2 mg total) under the skin every 7 days

## 2024-09-10 NOTE — ASSESSMENT & PLAN NOTE
Orders:    Mammo screening bilateral w 3d and cad; Future  The need for mammogram yearly was discussed with patient.

## 2024-09-10 NOTE — PROGRESS NOTES
Ambulatory Visit  Name: Tova Faulkner      : 1960      MRN: [id number]  Encounter Provider: Benjamín Shin MD  Encounter Date: 9/10/2024   Encounter department: [facility] CARE [facility]    Assessment & Plan  Uncontrolled type 2 diabetes mellitus with hyperglycemia (HCC)    Lab Results   Component Value Date    HGBA1C 6.4 09/10/2024     Diabetes management: Continue current regimen of metformin 2 times a day and Semaglutide 2 mg weekly. Ensure regular blood glucose monitoring and  report back to office. follow-up in 6 months with me.    Orders:    POCT hemoglobin A1c    Comprehensive metabolic panel; Future    metFORMIN (GLUCOPHAGE) 500 mg tablet; Take 1 tablet (500 mg total) by mouth 2 (two) times a day with meals    semaglutide, 2 mg/dose, (Ozempic, 2 MG/DOSE,) 8 mg/ mL injection pen; Inject 0.75 mL (2 mg total) under the skin every 7 days    Chronic right-sided low back pain with right-sided sciatica   Chronic L2-L3 radiculopathy on the right side: The EMG results from 2024 indicate a neurophysiological pattern consistent with chronic L2-L3 radiculopathy on the right side. We will continue with conservative management, including physical therapy and pain management strategies. Consideration for further imaging or referral to a spine specialist if symptoms persist or worsen.  No lumbosacral radiculopathy on the left side: The EMG did not show evidence of lumbosacral radiculopathy on the left side. No further action required at this time.   Possible lateral femoral cutaneous neuropathy: The nerve conduction studies were limited by body habitus, and superimposed lateral femoral cutaneous neuropathy cannot be excluded. We will monitor symptoms and consider further evaluation if clinically indicated.  . No large-fiber polyneuropathy: There is no neurophysiological evidence of large-fiber polyneuropathy. Continue monitoring for any new symptoms.  Right thigh pain: Likely related to  chronic L2-L3 radiculopathy. Continue with current pain management strategies and physical therapy. Consider referral to pain management if pain persists.               Menopause    Routine health maintenance: Schedule bone density scan, mammogram, and blood tests including phosphorus, liver, and kidney function tests. Administer flu vaccine in November and ensure other vaccinations are up to date.  Orders:    DXA bone density spine hip and pelvis; Future    Need for shingles vaccine  Explained to patient that Shingles, also known as herpes zoster, is a painful skin rash caused by reactivation of the varicella zoster virus (the same virus that causes chickenpox).Facts: 99% of people over 50 years of age are living with the virus that causes shingles. 1 in 3 people will get shingles in their lifetime.    SHINGRIX is the only vaccine proven to be up to 90% effective at preventing shingles in clinical trials.   Orders:    Zoster Vac Recomb Adjuvanted (Shingrix) 50 MCG/0.5ML SUSR; Inject 0.5 mL into a muscle once for 1 dose Repeat dose in 2 to 6 months    Hyperphosphatemia  I reviewed previous laboratory data and consulting notes, we are going to repeat labs to ensure stability   Orders:    Phosphorus; Future    PTH, Intact and Calcium; Future    Mixed hyperlipidemia  Patient has hyperlipidemia , current recommendations are exercise and decrease  saturated fat in his diet.  Will repeat that this labs and make further decisions in base non-HDL numbers and the vascular disease risks.   Continue same treatment.  Orders:    atorvastatin (LIPITOR) 40 mg tablet; Take 1 tablet (40 mg total) by mouth daily    ezetimibe (ZETIA) 10 mg tablet; Take 1 tablet (10 mg total) by mouth daily    Essential hypertension    6. Hypertension management: Continue Nifedipine 30 mg  and losartan 50 mg daily. Monitor blood pressure regularly and adjust medication as needed.    Orders:    losartan (COZAAR) 50 mg tablet; Take 1 tablet (50 mg total)  by mouth 2 (two) times a day    NIFEdipine (PROCARDIA XL) 30 mg 24 hr tablet; Take 1 tablet (30 mg total) by mouth daily at bedtime    Persistent proteinuria  Continue care with Nephrologist. The importance of controlling hypertension and diabetes was discussed with patient in order to control proteinuria       Vitamin D deficiency  Continue with vitamin D supplementation to take 1 capsule every week with dinner  Orders:    ergocalciferol (VITAMIN D2) 50,000 units; Take 1 capsule (50,000 Units total) by mouth once a week    Breast cancer screening by mammogram    Orders:    Mammo screening bilateral w 3d and cad; Future  The need for mammogram yearly was discussed with patient.     History of Present Illness     EMG results from 02/2024 indicate chronic L2-L3 radiculopathy on the right side, no lumbosacral radiculopathy on the left side, and no large-fiber polyneuropathy. Nerve conduction studies were limited by body habitus, and superimposed lateral femoral cutaneous neuropathy cannot be excluded.  Current medications: Metformin 2 times a day, terencemagutida 2 mg weekly, losartan 50 mg daily.  Pending tests: Bone density scan, mammogram, blood tests including phosphorus, liver, and kidney function tests.  Vaccinations: Flu vaccine due in November.            History obtained from : patient  Review of Systems  Medical History Reviewed by provider this encounter:       Current Outpatient Medications on File Prior to Visit   Medication Sig Dispense Refill    Comfort Touch Plus Lancets 30G MISC USE TO TEST THE BLOOD SUGAR EVERY MORNING 100 each 1    glucose blood (FREESTYLE LITE) test strip USE TO TEST THE BLOOD SUGAR THREE TIMES A DAY Dx: E11.65 300 strip 3    UltiCare Alcohol Swabs 70 % PADS USE TO CLEAN THE AREAS BEFORE TESTING BLOOD SUGAR OR/AND INJECTING INSULIN THREE TIMES A DAY AS DIRECTED 100 each 5    [DISCONTINUED] atorvastatin (LIPITOR) 40 mg tablet       [DISCONTINUED] carvedilol (COREG) 12.5 mg tablet  "Take 1 tablet (12.5 mg total) by mouth 2 (two) times a day with meals 180 tablet 3    [DISCONTINUED] ergocalciferol (VITAMIN D2) 50,000 units TAKE ONE CAPSULE BY MOUTH ONCE EVERY WEEK 12 capsule 7    [DISCONTINUED] ezetimibe (ZETIA) 10 mg tablet TAKE ONE TABLET BY MOUTH ONCE DAILY 30 tablet 5    [DISCONTINUED] losartan (COZAAR) 50 mg tablet Take 1 tablet (50 mg total) by mouth 2 (two) times a day 180 tablet 3    [DISCONTINUED] metFORMIN (GLUCOPHAGE) 500 mg tablet TAKE ONE TABLET BY MOUTH TWICE A DAY WITH MEALS 180 tablet 1    [DISCONTINUED] NIFEdipine (PROCARDIA XL) 30 mg 24 hr tablet Take 1 tablet (30 mg total) by mouth daily at bedtime 90 tablet 3    [DISCONTINUED] omeprazole (PriLOSEC) 20 mg delayed release capsule TAKE ONE CAPSULE BY MOUTH BEFORE BREAKFAST ONCE DAILY (Patient not taking: Reported on 6/24/2024) 30 capsule 5    [DISCONTINUED] polyethylene glycol (GLYCOLAX) 17 GM/SCOOP powder  (Patient not taking: Reported on 6/24/2024)      [DISCONTINUED] polyethylene glycol (MIRALAX) 17 g packet Take 17 g by mouth daily 510 g 0    [DISCONTINUED] semaglutide, 2 mg/dose, (Ozempic, 2 MG/DOSE,) 8 mg/ mL injection pen INJECT 0.75 ML SUBCUTANEOUSLY ONCE EVERY WEEK (Patient not taking: Reported on 6/24/2024) 3 mL 5    [DISCONTINUED] traZODone (DESYREL) 50 mg tablet TAKE ONE TABLET BY MOUTH ONCE DAILY AT BEDTIME (Patient not taking: Reported on 6/24/2024) 90 tablet 1     No current facility-administered medications on file prior to visit.          Objective     /70 (BP Location: Left arm, Patient Position: Sitting, Cuff Size: Standard)   Pulse 92   Temp 97.9 °F (36.6 °C) (Tympanic)   Resp 16   Ht 4' 3\" (1.295 m)   Wt 78.9 kg (174 lb)   SpO2 98%   BMI 47.03 kg/m²     Physical Exam  Cardiovascular:      Pulses: no weak pulses.           Dorsalis pedis pulses are 2+ on the right side and 2+ on the left side.        Posterior tibial pulses are 2+ on the right side and 2+ on the left side.   Feet:      Right " foot:      Skin integrity: No ulcer, skin breakdown, erythema, warmth, callus or dry skin.      Left foot:      Skin integrity: No ulcer, skin breakdown, erythema, warmth, callus or dry skin.     Obese with Body mass index is 47.03 kg/m².  Non distress, normal respiratory effort. Pulse is regular. Heart without murmurs.   Patient's shoes and socks removed.    Right Foot/Ankle   Right Foot Inspection  Skin Exam: skin normal and skin intact. No dry skin, no warmth, no callus, no erythema, no maceration, no abnormal color, no pre-ulcer, no ulcer and no callus.     Toe Exam: ROM and strength within normal limits. No swelling, no tenderness, erythema and  no right toe deformity    Sensory   Vibration: intact  Proprioception: intact  Monofilament testing: intact    Vascular  Capillary refills: < 3 seconds  The right DP pulse is 2+. The right PT pulse is 2+.     Left Foot/Ankle  Left Foot Inspection  Skin Exam: skin normal and skin intact. No dry skin, no warmth, no erythema, no maceration, normal color, no pre-ulcer, no ulcer and no callus.     Toe Exam: ROM and strength within normal limits. No swelling, no tenderness, no erythema and no left toe deformity.     Sensory   Vibration: intact  Proprioception: intact  Monofilament testing: intact    Vascular  Capillary refills: < 3 seconds  The left DP pulse is 2+. The left PT pulse is 2+.     Assign Risk Category  No deformity present  No loss of protective sensation  No weak pulses  Risk: 0      Administrative Statements   I have spent a total time of 35 minutes in caring for this patient on the day of the visit/encounter including Diagnostic results, Prognosis, Risks and benefits of tx options, and Instructions for management.      Benjamín Shin MD   [facility]

## 2024-09-10 NOTE — ASSESSMENT & PLAN NOTE
Lab Results   Component Value Date    HGBA1C 6.4 02/21/2024       Orders:    POCT hemoglobin A1c

## 2024-09-10 NOTE — ASSESSMENT & PLAN NOTE
Continue with vitamin D supplementation to take 1 capsule every week with dinner  Orders:    ergocalciferol (VITAMIN D2) 50,000 units; Take 1 capsule (50,000 Units total) by mouth once a week

## 2024-09-10 NOTE — ASSESSMENT & PLAN NOTE
Patient has hyperlipidemia , current recommendations are exercise and decrease  saturated fat in his diet.  Will repeat that this labs and make further decisions in base non-HDL numbers and the vascular disease risks.   Continue same treatment.  Orders:    atorvastatin (LIPITOR) 40 mg tablet; Take 1 tablet (40 mg total) by mouth daily    ezetimibe (ZETIA) 10 mg tablet; Take 1 tablet (10 mg total) by mouth daily

## 2024-09-10 NOTE — ASSESSMENT & PLAN NOTE
Explained to patient that Shingles, also known as herpes zoster, is a painful skin rash caused by reactivation of the varicella zoster virus (the same virus that causes chickenpox).Facts: 99% of people over 50 years of age are living with the virus that causes shingles. 1 in 3 people will get shingles in their lifetime.    SHINGRIX is the only vaccine proven to be up to 90% effective at preventing shingles in clinical trials.   Orders:    Zoster Vac Recomb Adjuvanted (Shingrix) 50 MCG/0.5ML SUSR; Inject 0.5 mL into a muscle once for 1 dose Repeat dose in 2 to 6 months

## 2024-09-10 NOTE — ASSESSMENT & PLAN NOTE
Chronic L2-L3 radiculopathy on the right side: The EMG results from 02/2024 indicate a neurophysiological pattern consistent with chronic L2-L3 radiculopathy on the right side. We will continue with conservative management, including physical therapy and pain management strategies. Consideration for further imaging or referral to a spine specialist if symptoms persist or worsen.  No lumbosacral radiculopathy on the left side: The EMG did not show evidence of lumbosacral radiculopathy on the left side. No further action required at this time.   Possible lateral femoral cutaneous neuropathy: The nerve conduction studies were limited by body habitus, and superimposed lateral femoral cutaneous neuropathy cannot be excluded. We will monitor symptoms and consider further evaluation if clinically indicated.  . No large-fiber polyneuropathy: There is no neurophysiological evidence of large-fiber polyneuropathy. Continue monitoring for any new symptoms.  Right thigh pain: Likely related to chronic L2-L3 radiculopathy. Continue with current pain management strategies and physical therapy. Consider referral to pain management if pain persists.

## 2024-09-10 NOTE — ASSESSMENT & PLAN NOTE
Orders:    Zoster Vac Recomb Adjuvanted (Shingrix) 50 MCG/0.5ML SUSR; Inject 0.5 mL into a muscle once for 1 dose Repeat dose in 2 to 6 months

## 2024-09-10 NOTE — PROGRESS NOTES
Ambulatory Visit  Name: Tova Faulkner      : 1960      MRN: [id number]  Encounter Provider: Benjamín Shin MD  Encounter Date: 9/10/2024   Encounter department: [facility]    Assessment & Plan   1. Uncontrolled type 2 diabetes mellitus with hyperglycemia (HCC)  -     POCT hemoglobin A1c  2. Breast cancer screening by mammogram  -     Mammo screening bilateral w 3d and cad; Future; Expected date: 2024  3. Chronic right-sided low back pain with right-sided sciatica  4. Menopause  -     DXA bone density spine hip and pelvis; Future; Expected date: 09/10/2024  5. Need for shingles vaccine  -     Zoster Vac Recomb Adjuvanted (Shingrix) 50 MCG/0.5ML SUSR; Inject 0.5 mL into a muscle once for 1 dose Repeat dose in 2 to 6 months         History of Present Illness   {Disappearing Hyperlinks I Encounters * My Last Note * Since Last Visit * History :61349}  HPI  Review of Systems  Past Medical History:   Diagnosis Date    Back problem     herniated discs    Chronic pain disorder     back    Depression     Diabetes mellitus (HCC)     Diverticulitis     Diverticulosis     Hyperglycemia     Hyperlipidemia     Hypertension     Impacted cerumen     Obesity     Palpitations      Past Surgical History:   Procedure Laterality Date    CARPAL TUNNEL RELEASE Right     DIAGNOSTIC LAPAROSCOPY      ESOPHAGOGASTRODUODENOSCOPY  10/2018    gastroduodenitis    MAMMO STEREOTACTIC BREAST BIOPSY LEFT (ALL INC) Left 2023    PERIPHERAL ANGIOGRAM      [place] COLONOSCOPY FLX DX W/COLLJ SPEC WHEN PFRMD N/A 2018    dr chowdhury, diverticulosis    [place] ESOPHAGOGASTRODUODENOSCOPY TRANSORAL DIAGNOSTIC N/A 10/10/2018    Procedure: EGD;  Surgeon: Greg Chowdhury DO;  Location:  GI LAB;  Service: General    UPPER GASTROINTESTINAL ENDOSCOPY  2024     Family History   Problem Relation Age of Onset    Kidney disease Mother     Hypertension Mother     Coronary artery disease Father         premature     Diabetes Sister     Coronary artery disease Sister     No Known Problems Daughter     No Known Problems Maternal Grandmother     Coronary artery disease Maternal Grandfather     Diabetes Paternal Grandfather     Coronary artery disease Paternal Grandfather     Breast cancer Paternal Aunt 40     Social History     Tobacco Use    Smoking status: Never    Smokeless tobacco: Never   Vaping Use    Vaping status: Never Used   Substance and Sexual Activity    Alcohol use: No    Drug use: No    Sexual activity: Not Currently     Partners: Male     Current Outpatient Medications on File Prior to Visit   Medication Sig    atorvastatin (LIPITOR) 40 mg tablet     Comfort Touch Plus Lancets 30G MISC USE TO TEST THE BLOOD SUGAR EVERY MORNING    ergocalciferol (VITAMIN D2) 50,000 units TAKE ONE CAPSULE BY MOUTH ONCE EVERY WEEK    ezetimibe (ZETIA) 10 mg tablet TAKE ONE TABLET BY MOUTH ONCE DAILY    glucose blood (FREESTYLE LITE) test strip USE TO TEST THE BLOOD SUGAR THREE TIMES A DAY Dx: E11.65    losartan (COZAAR) 50 mg tablet Take 1 tablet (50 mg total) by mouth 2 (two) times a day    metFORMIN (GLUCOPHAGE) 500 mg tablet TAKE ONE TABLET BY MOUTH TWICE A DAY WITH MEALS    NIFEdipine (PROCARDIA XL) 30 mg 24 hr tablet Take 1 tablet (30 mg total) by mouth daily at bedtime    omeprazole (PriLOSEC) 20 mg delayed release capsule TAKE ONE CAPSULE BY MOUTH BEFORE BREAKFAST ONCE DAILY (Patient not taking: Reported on 6/24/2024)    polyethylene glycol (GLYCOLAX) 17 GM/SCOOP powder  (Patient not taking: Reported on 6/24/2024)    polyethylene glycol (MIRALAX) 17 g packet Take 17 g by mouth daily    semaglutide, 2 mg/dose, (Ozempic, 2 MG/DOSE,) 8 mg/ mL injection pen INJECT 0.75 ML SUBCUTANEOUSLY ONCE EVERY WEEK (Patient not taking: Reported on 6/24/2024)    traZODone (DESYREL) 50 mg tablet TAKE ONE TABLET BY MOUTH ONCE DAILY AT BEDTIME (Patient not taking: Reported on 6/24/2024)    UltiCare Alcohol Swabs 70 % PADS USE TO CLEAN THE AREAS  "BEFORE TESTING BLOOD SUGAR OR/AND INJECTING INSULIN THREE TIMES A DAY AS DIRECTED    [DISCONTINUED] carvedilol (COREG) 12.5 mg tablet Take 1 tablet (12.5 mg total) by mouth 2 (two) times a day with meals     Allergies   Allergen Reactions    Farxiga [Dapagliflozin] Angioedema    Vicodin [Hydrocodone-Acetaminophen] Angioedema    Percocet [Oxycodone-Acetaminophen] GI Intolerance    Gabapentin GI Intolerance     vomiting    Lyrica [Pregabalin] GI Intolerance     Stomach ache.    Aspirin GI Intolerance    Diclofenac Sodium Rash     Immunization History   Administered Date(s) Administered    COVID-19 MODERNA VACC 0.5 ML IM 03/30/2021, 05/03/2021, 12/27/2021, 08/12/2022    Hep B, Adolescent or Pediatric 07/17/2017, 12/11/2017    Hep B, adult 04/09/2018    INFLUENZA 10/08/2012, 12/11/2017, 02/22/2019, 10/22/2019, 10/02/2020, 10/20/2021, 10/07/2022, 11/13/2023    Influenza, injectable, quadrivalent, preservative free 0.5 mL 11/13/2023    Influenza, recombinant, quadrivalent,injectable, preservative free 02/22/2019, 10/22/2019, 10/02/2020, 10/20/2021    Pneumococcal Conjugate Vaccine 20-valent (Pcv20), Polysace 02/21/2024    Pneumococcal Polysaccharide PPV23 11/23/2018    Td (adult), adsorbed 01/22/2018    Tdap 01/22/2018, 01/22/2018     Objective   {Disappearing Hyperlinks   Review Vitals * Enter New Vitals * Results Review * Labs * Imaging * Cardiology * Procedures * Lung Cancer Screening * Surgical eConsent :68452}  /70 (BP Location: Left arm, Patient Position: Sitting, Cuff Size: Standard)   Pulse 92   Temp 97.9 °F (36.6 °C) (Tympanic)   Resp 16   Ht 4' 3\" (1.295 m)   Wt 78.9 kg (174 lb)   SpO2 98%   BMI 47.03 kg/m²     Physical Exam  Constitutional:       Appearance: Normal appearance.   Cardiovascular:      Pulses: no weak pulses.           Dorsalis pedis pulses are 2+ on the right side and 2+ on the left side.        Posterior tibial pulses are 2+ on the right side and 2+ on the left side. "   Musculoskeletal:        Feet:    Feet:      Right foot:      Skin integrity: Callus and dry skin present.      Left foot:      Skin integrity: Callus and dry skin present.   Neurological:      Mental Status: She is alert.     Patient's shoes and socks removed.    Right Foot/Ankle   Right Foot Inspection  Skin Exam: dry skin, callus and callus.     Toe Exam: ROM and strength within normal limits.     Sensory   Monofilament testing: intact    Vascular  Capillary refills: < 3 seconds  The right DP pulse is 2+. The right PT pulse is 2+.     Left Foot/Ankle  Left Foot Inspection  Skin Exam: dry skin and callus.     Toe Exam: ROM and strength within normal limits.     Sensory   Monofilament testing: intact    Vascular  Capillary refills: < 3 seconds  The left DP pulse is 2+. The left PT pulse is 2+.     Assign Risk Category  No deformity present  No loss of protective sensation  No weak pulses  Risk: 0         {Administrative / Billing Section (Optional):77201}

## 2024-09-10 NOTE — ASSESSMENT & PLAN NOTE
I reviewed previous laboratory data and consulting notes, we are going to repeat labs to ensure stability   Orders:    Phosphorus; Future    PTH, Intact and Calcium; Future

## 2024-09-15 NOTE — RESULT ENCOUNTER NOTE
Dear Tova:  The recent labs showed improvement in all the numbers.  The phosphorus now is normal.

## 2024-09-27 ENCOUNTER — HOSPITAL ENCOUNTER (OUTPATIENT)
Dept: MAMMOGRAPHY | Facility: CLINIC | Age: 64
End: 2024-09-27
Payer: MEDICARE

## 2024-09-27 ENCOUNTER — HOSPITAL ENCOUNTER (OUTPATIENT)
Dept: BONE DENSITY | Facility: CLINIC | Age: 64
End: 2024-09-27
Payer: MEDICARE

## 2024-09-27 ENCOUNTER — OFFICE VISIT (OUTPATIENT)
Dept: FAMILY MEDICINE CLINIC | Facility: CLINIC | Age: 64
End: 2024-09-27
Payer: MEDICARE

## 2024-09-27 VITALS
SYSTOLIC BLOOD PRESSURE: 124 MMHG | BODY MASS INDEX: 46.44 KG/M2 | HEART RATE: 96 BPM | TEMPERATURE: 96.8 F | OXYGEN SATURATION: 97 % | DIASTOLIC BLOOD PRESSURE: 70 MMHG | WEIGHT: 171.8 LBS | RESPIRATION RATE: 16 BRPM

## 2024-09-27 VITALS — BODY MASS INDEX: 39.57 KG/M2 | WEIGHT: 171 LBS | HEIGHT: 55 IN

## 2024-09-27 VITALS — BODY MASS INDEX: 67.81 KG/M2 | HEIGHT: 55 IN | WEIGHT: 293 LBS

## 2024-09-27 DIAGNOSIS — Z78.0 MENOPAUSE: ICD-10-CM

## 2024-09-27 DIAGNOSIS — R39.15 URGENCY OF URINATION: ICD-10-CM

## 2024-09-27 DIAGNOSIS — Z12.31 BREAST CANCER SCREENING BY MAMMOGRAM: ICD-10-CM

## 2024-09-27 DIAGNOSIS — N39.0 URINARY TRACT INFECTION WITHOUT HEMATURIA, SITE UNSPECIFIED: Primary | ICD-10-CM

## 2024-09-27 DIAGNOSIS — R35.0 FREQUENCY OF URINATION: ICD-10-CM

## 2024-09-27 PROCEDURE — 77067 SCR MAMMO BI INCL CAD: CPT

## 2024-09-27 PROCEDURE — G2211 COMPLEX E/M VISIT ADD ON: HCPCS

## 2024-09-27 PROCEDURE — 99213 OFFICE O/P EST LOW 20 MIN: CPT

## 2024-09-27 PROCEDURE — 77080 DXA BONE DENSITY AXIAL: CPT

## 2024-09-27 PROCEDURE — 77063 BREAST TOMOSYNTHESIS BI: CPT

## 2024-09-27 RX ORDER — PHENAZOPYRIDINE HYDROCHLORIDE 200 MG/1
200 TABLET, FILM COATED ORAL
Qty: 10 TABLET | Refills: 0 | Status: SHIPPED | OUTPATIENT
Start: 2024-09-27

## 2024-09-27 RX ORDER — CEPHALEXIN 500 MG/1
500 CAPSULE ORAL 2 TIMES DAILY
Qty: 14 CAPSULE | Refills: 0 | Status: SHIPPED | OUTPATIENT
Start: 2024-09-27 | End: 2024-10-04

## 2024-09-27 NOTE — PROGRESS NOTES
Ambulatory Visit  Name: Tova Faulkner      : 1960      MRN: [id number]  Encounter Provider: Brian Beth MD  Encounter Date: 2024   Encounter department: [facility]    Assessment & Plan  Urinary tract infection without hematuria, site unspecified  Patient unable to provide sufficient urine for urine dip.  Will begin antibiotic empirically, Keflex 500 mg twice daily for 7 days.  Will also be sending prescription for Pyridium 200 mg 3 times daily for symptom control.  Have reviewed medication side effects and patient in agreement.  Return precautions given not limited to: New onset fever, chills, worsening abdominal pain, development of flank pain, continue  symptoms despite antibiotic therapy.  Orders:    cephalexin (KEFLEX) 500 mg capsule; Take 1 capsule (500 mg total) by mouth 2 (two) times a day for 7 days    phenazopyridine (PYRIDIUM) 200 mg tablet; Take 1 tablet (200 mg total) by mouth 3 (three) times a day with meals    Frequency of urination    Orders:    POCT urine dip    cephalexin (KEFLEX) 500 mg capsule; Take 1 capsule (500 mg total) by mouth 2 (two) times a day for 7 days    phenazopyridine (PYRIDIUM) 200 mg tablet; Take 1 tablet (200 mg total) by mouth 3 (three) times a day with meals    Urgency of urination    Orders:    cephalexin (KEFLEX) 500 mg capsule; Take 1 capsule (500 mg total) by mouth 2 (two) times a day for 7 days    phenazopyridine (PYRIDIUM) 200 mg tablet; Take 1 tablet (200 mg total) by mouth 3 (three) times a day with meals       History of Present Illness     Pleasant 64-year-old female with medical history significant for hypertension, HLD, T2DM, chronic low back pain, diverticulitis and anxiety presenting to clinic for evaluation of difficulty urinating.  Symptoms have been present for the past week and she feels as if she has incomplete bladder emptying.  With this she also notes frequency and urgency to urinate though very  minimal urine comes out.  No dysuria specifically.  She is denying any stress or urge incontinence and states that discomfort is somewhat similar to previous  diverticulitis flares.  Remaining ROS as noted below          Review of Systems   Constitutional:  Negative for chills and fever.   Gastrointestinal:  Positive for abdominal pain (LLQ and is going to see GI this coming monday). Negative for blood in stool, constipation, diarrhea, nausea and vomiting.   Genitourinary:  Positive for decreased urine volume, difficulty urinating, frequency and urgency. Negative for dysuria, flank pain, hematuria, pelvic pain, vaginal bleeding, vaginal discharge and vaginal pain.           Objective     /70 (BP Location: Left arm, Patient Position: Sitting, Cuff Size: Standard)   Pulse 96   Temp (!) 96.8 °F (36 °C) (Tympanic)   Resp 16   Wt 77.9 kg (171 lb 12.8 oz)   SpO2 97%   BMI 46.44 kg/m²     Physical Exam  Vitals and nursing note reviewed.   Constitutional:       Appearance: Normal appearance.   Eyes:      Conjunctiva/sclera: Conjunctivae normal.   Cardiovascular:      Rate and Rhythm: Normal rate and regular rhythm.      Pulses: Normal pulses.      Heart sounds: Normal heart sounds.   Pulmonary:      Effort: Pulmonary effort is normal.      Breath sounds: Normal breath sounds.   Abdominal:      General: Abdomen is flat. There is no distension.      Palpations: Abdomen is soft. There is no mass.      Tenderness: There is abdominal tenderness in the left upper quadrant. There is no right CVA tenderness, guarding or rebound.      Hernia: No hernia is present.      Comments: Left upper quadrant tenderness noted to be chronic and present since her diagnosis of diverticulosis.   Skin:     General: Skin is warm and dry.   Neurological:      General: No focal deficit present.      Mental Status: She is alert.   Psychiatric:         Mood and Affect: Mood normal.         Behavior: Behavior normal.

## 2024-09-27 NOTE — ASSESSMENT & PLAN NOTE
Patient unable to provide sufficient urine for urine dip.  Will begin antibiotic empirically, Keflex 500 mg twice daily for 7 days.  Will also be sending prescription for Pyridium 200 mg 3 times daily for symptom control.  Have reviewed medication side effects and patient in agreement.  Return precautions given not limited to: New onset fever, chills, worsening abdominal pain, development of flank pain, continue  symptoms despite antibiotic therapy.  Orders:    cephalexin (KEFLEX) 500 mg capsule; Take 1 capsule (500 mg total) by mouth 2 (two) times a day for 7 days    phenazopyridine (PYRIDIUM) 200 mg tablet; Take 1 tablet (200 mg total) by mouth 3 (three) times a day with meals

## 2024-09-30 ENCOUNTER — OFFICE VISIT (OUTPATIENT)
Dept: GASTROENTEROLOGY | Facility: CLINIC | Age: 64
End: 2024-09-30
Payer: MEDICARE

## 2024-09-30 VITALS
SYSTOLIC BLOOD PRESSURE: 132 MMHG | HEART RATE: 87 BPM | WEIGHT: 168 LBS | HEIGHT: 55 IN | DIASTOLIC BLOOD PRESSURE: 84 MMHG | BODY MASS INDEX: 38.88 KG/M2 | TEMPERATURE: 97.2 F | OXYGEN SATURATION: 97 %

## 2024-09-30 DIAGNOSIS — R10.13 EPIGASTRIC PAIN: Primary | ICD-10-CM

## 2024-09-30 DIAGNOSIS — K76.9 LIVER LESION: ICD-10-CM

## 2024-09-30 PROCEDURE — G2211 COMPLEX E/M VISIT ADD ON: HCPCS | Performed by: INTERNAL MEDICINE

## 2024-09-30 PROCEDURE — 99213 OFFICE O/P EST LOW 20 MIN: CPT | Performed by: INTERNAL MEDICINE

## 2024-09-30 RX ORDER — SEMAGLUTIDE 1.34 MG/ML
INJECTION, SOLUTION SUBCUTANEOUS
COMMUNITY
Start: 2024-09-13

## 2024-09-30 NOTE — PROGRESS NOTES
[facility] - Outpatient Consultation  Tova Faulkner 64 y.o. female MRN: [id number]  Encounter: [id number]          ASSESSMENT AND PLAN:    Tova Faulkner is a 64 y.o. female with PMH of T2DM, constipation, chronic abdominal pain, abnormal liver imaging, hepatic steatosis presenting for f/u.     Chronic Abdominal Pain  - Given normal diagnostic work up thus far including EGD, gastric emptying study, ultrasound etiology remains unclear and ddx broadening to include possible functional abdominal pain, suspect epigastric pain syndrome  - Check CT abdomen/pelvis as below, r/o reversible causes such as severe fecal burden  - Pending imaging, consider initiation of low dose amitryptiline     Hepatic Lesion  - Abnormal area seen on RUQ U/S this year  - LFTs normal  - Triple phase CT for evaluation    Hepatic Steatosis  - Counseled on weight loss    1. Epigastric pain    2. Liver lesion        Orders Placed This Encounter   Procedures    CT abdomen pelvis w contrast     ______________________________________________________________________    HPI:    Tova Faulkner is a 64 y.o. female with PMH of T2DM, constipation, chronic abdominal pain, abnormal liver imaging, hepatic steatosis presenting for f/u.     Last seen in clinic 6/24/24 for abdominal pain, constipation. Since that time has had gastric emptying study which was normal (no gastroparesis). Reports continued chronic abdominal pain, left sided. Exacerbated by foods, especially greasy foods, and states that it has been ongoing without a clear explanation since 2016.     Constipation has been generally managed well with increased hydration, but finds it difficult to drink enough water. ROS otherwise negative. Liver chemistries normal on most recent check (9/10/24).     EGD 5/22/24 normal.       REVIEW OF SYSTEMS:    CONSTITUTIONAL: Denies any fever, chills, rigors, and weight loss.  HEENT: No earache or tinnitus. Denies hearing loss or visual  disturbances.  CARDIOVASCULAR: No chest pain or palpitations.   RESPIRATORY: Denies any cough, hemoptysis, shortness of breath or dyspnea on exertion.  GASTROINTESTINAL: As noted in the History of Present Illness.   GENITOURINARY: No problems with urination. Denies any hematuria or dysuria.  NEUROLOGIC: No dizziness or vertigo, denies headaches.   MUSCULOSKELETAL: Denies any muscle or joint pain.   SKIN: Denies skin rashes or itching.   ENDOCRINE: Denies excessive thirst. Denies intolerance to heat or cold.  PSYCHOSOCIAL: Denies depression or anxiety. Denies any recent memory loss.       Historical Information   Past Medical History:   Diagnosis Date    Back problem     herniated discs    Chronic pain disorder     back    Depression     Diabetes mellitus (HCC)     Diverticulitis     Diverticulosis     Hyperglycemia     Hyperlipidemia     Hypertension     Impacted cerumen     Obesity     Palpitations      Past Surgical History:   Procedure Laterality Date    BREAST CYST EXCISION Left     CARPAL TUNNEL RELEASE Right 1983    DIAGNOSTIC LAPAROSCOPY      ESOPHAGOGASTRODUODENOSCOPY  10/2018    gastroduodenitis    MAMMO STEREOTACTIC BREAST BIOPSY LEFT (ALL INC) Left 11/08/2023    PERIPHERAL ANGIOGRAM      [place] COLONOSCOPY FLX DX W/COLLJ SPEC WHEN PFRMD N/A 09/12/2018    dr chowdhury, diverticulosis    RI ESOPHAGOGASTRODUODENOSCOPY TRANSORAL DIAGNOSTIC N/A 10/10/2018    Procedure: EGD;  Surgeon: Greg Chowdhury DO;  Location:  GI LAB;  Service: General    UPPER GASTROINTESTINAL ENDOSCOPY  05/22/2024     Social History   Social History     Substance and Sexual Activity   Alcohol Use No     Social History     Substance and Sexual Activity   Drug Use No     Social History     Tobacco Use   Smoking Status Never   Smokeless Tobacco Never     Family History   Problem Relation Age of Onset    Kidney disease Mother     Hypertension Mother     Coronary artery disease Father         premature    Diabetes Sister     Coronary artery disease  Sister     No Known Problems Daughter     No Known Problems Maternal Grandmother     Coronary artery disease Maternal Grandfather     Diabetes Paternal Grandfather     Coronary artery disease Paternal Grandfather     Breast cancer Paternal Aunt 40       Meds/Allergies       Current Outpatient Medications:     Canagliflozin-metFORMIN HCl  MG TABS    Ozempic, 0.25 or 0.5 MG/DOSE, 2 MG/1.5ML injection pen    atorvastatin (LIPITOR) 40 mg tablet    cephalexin (KEFLEX) 500 mg capsule    Comfort Touch Plus Lancets 30G MISC    ergocalciferol (VITAMIN D2) 50,000 units    ezetimibe (ZETIA) 10 mg tablet    glucose blood (FREESTYLE LITE) test strip    losartan (COZAAR) 50 mg tablet    metFORMIN (GLUCOPHAGE) 500 mg tablet    NIFEdipine (PROCARDIA XL) 30 mg 24 hr tablet    phenazopyridine (PYRIDIUM) 200 mg tablet    semaglutide, 2 mg/dose, (Ozempic, 2 MG/DOSE,) 8 mg/ mL injection pen    UltiCare Alcohol Swabs 70 % PADS    Allergies   Allergen Reactions    Farxiga [Dapagliflozin] Angioedema    Vicodin [Hydrocodone-Acetaminophen] Angioedema    Percocet [Oxycodone-Acetaminophen] GI Intolerance    Gabapentin GI Intolerance     vomiting    Lyrica [Pregabalin] GI Intolerance     Stomach ache.    Aspirin GI Intolerance    Diclofenac Sodium Rash           Objective     not currently breastfeeding.   There is no height or weight on file to calculate BMI.  Wt Readings from Last 3 Encounters:   09/27/24 (!) 171 kg (376 lb 15.8 oz)   09/27/24 77.6 kg (171 lb)   09/27/24 77.9 kg (171 lb 12.8 oz)        PHYSICAL EXAM:      General Appearance:   Alert, cooperative, no distress   HEENT:   Normocephalic, atraumatic, anicteric.     Neck:  Supple, symmetrical, trachea midline   Lungs:   Clear to auscultation bilaterally; no rales, rhonchi or wheezing; respirations unlabored    Heart::   Regular rate and rhythm; no murmur, rub, or gallop.   Abdomen:   Soft, non-tender, non-distended; normal bowel sounds; no masses, no organomegaly   "  Genitalia:   Deferred    Rectal:   Deferred    Extremities:  No cyanosis, clubbing or edema    Pulses:  2+ and symmetric    Skin:  No jaundice, rashes, or lesions    Lymph nodes:  No palpable cervical lymphadenopathy        Lab Results:         Lab Units 09/10/24  0931 07/17/24  0713 07/03/24  0711 01/08/24  0830 09/06/23  0753 04/05/23  0834   SODIUM mmol/L 141 143 138 138 139 137   POTASSIUM mmol/L 4.1 3.9 4.1 3.9 4.0 4.3   CHLORIDE mmol/L 104 107 99 104 104 107   CO2 mmol/L 26 23 23 25 24 26   BUN mg/dL 10 11 13 11 14 16   CREATININE mg/dL 0.71 0.70 0.66 0.68 0.66 0.72   GLUCOSE RANDOM mg/dL  --  78 74 164* 77 128   CALCIUM mg/dL 9.8 9.6 9.9 9.1 9.2 9.2   PHOSPHORUS mg/dL 3.5  --  4.4* 4.0 3.5 3.9            Lab Units 09/10/24  0931 07/03/24  0711 01/08/24  0830 09/06/23  0753 04/05/23  0834 02/20/23  0933   TOTAL PROTEIN g/dL 7.8  --   --   --   --  8.1   ALBUMIN g/dL 4.7 4.5 4.2 4.3 3.7 4.4   TOTAL BILIRUBIN mg/dL 0.36  --   --   --   --  0.37   AST U/L 15  --   --   --   --  23   ALT U/L 17  --   --   --   --  15   ALK PHOS U/L 67  --   --   --   --  105           Lab Units 03/08/24  0959 02/20/23  0933   WBC Thousand/uL 8.28 8.49   HEMOGLOBIN g/dL 12.5 13.3   HEMATOCRIT % 39.6 41.2   PLATELETS Thousands/uL 359 341   MCV fL 92 92   MCH pg 28.9 29.7   MCHC g/dL 31.6 32.3   RDW % 13.7 13.8   MPV fL 10.5 10.5   NRBC AUTO /100 WBCs 0 0   SEGS PCT % 61 64   IMMATURE GRANULOCYTES % % 0 0   LYMPHO PCT % 29 27   MONO PCT % 7 6   EOS PCT % 2 2   BASOS PCT % 1 1   TOTAL NEUT ABS Thousands/µL 5.06 5.42   IMMATURE GRANULOCYES ABS Thousand/uL 0.02 0.02   LYMPHS ABS Thousands/µL 2.39 2.30   MONOS ABS Thousand/µL 0.55 0.50   EOS ABS Thousand/µL 0.20 0.20   BASOS ABS Thousands/µL 0.06 0.05       No results for input(s): \"IRON\", \"TRANSFERRIN\", \"TRANSFERSAT\", \"FERRITIN\", \"RETIC\" in the last [id number] hours.    No results found for: \"CRP\"    Lab Results   Component Value Date    [id number] 3.180 08/17/2018       Radiology " Results:   Mammo screening bilateral w 3d and cad    Result Date: 9/30/2024  Narrative: DIAGNOSIS: Breast cancer screening by mammogram TECHNIQUE: Digital screening mammography was performed. Computer Aided Detection (CAD) analyzed all applicable images. COMPARISONS: Prior breast imaging dated: 11/08/2023, 11/08/2023, 10/24/2023, 06/07/2023, 05/25/2021, and 05/12/2021 RELEVANT HISTORY: Family Breast Cancer History: History of breast cancer in Paternal Aunt. Family Medical History: Family medical history includes breast cancer in paternal aunt. Personal History: No known relevant hormone history. Surgical history includes breast excisional biopsy. No known relevant medical history. The patient is scheduled in a reminder system for screening mammography. 8-10% of cancers will be missed on mammography. Management of a palpable abnormality must be based on clinical grounds.  Patients will be notified of their results via letter from our facility. Accredited by [employer] and FDA. RISK ASSESSMENT: 5 Year Tyrer-Cuzick: 3.58% 10 Year Tyrer-Cuzick: 6.56% Lifetime Tyrer-Cuzick: 13.36% TISSUE DENSITY: There are scattered areas of fibroglandular density. INDICATION: Tova Faulkner is a 64 y.o. female presenting for screening mammography. FINDINGS: There are no suspicious masses, grouped microcalcifications or areas of architectural distortion.  A radiopaque post core biopsy clip is seen in the left breast.  The skin and nipple areolar complex are unremarkable.     Impression: No mammographic evidence of malignancy. No significant ASSESSMENT/BI-RADS CATEGORY: Left: 1 - Negative Right: 1 - Negative Overall: 1 - Negative RECOMMENDATION:      - Routine screening mammogram in 1 year for both breasts. Workstation ID: [id number] Signed by:  Michelle Casanova MD       Gastroenterological Procedures:   05/22/24    EGD    Impression:  The esophagus appeared normal.  The stomach appeared normal.  Performed forceps biopsies  in the body of the stomach, incisura and antrum  The duodenum appeared normal.  Performed forceps biopsies in the duodenal bulb and 2nd part of the  duodenum    RECOMMENDATION:  Await pathology results    DO Alberto Lara MD  PGY-6 Gastroenterology Fellow  9/30/2024 12:50 PM

## 2024-10-01 ENCOUNTER — TELEPHONE (OUTPATIENT)
Dept: LAB | Facility: HOSPITAL | Age: 64
End: 2024-10-01

## 2024-10-01 NOTE — TELEPHONE ENCOUNTER
10/1 - pt is calling Dr to have orders placed - no orders are there from the Dr she is requesting to have done- pt will call back

## 2024-10-07 DIAGNOSIS — F32.9 REACTIVE DEPRESSION: ICD-10-CM

## 2024-10-08 NOTE — TELEPHONE ENCOUNTER
Requested medication(s) are due for refill today: No  Patient has already received a courtesy refill: No  Other reason request has been forwarded to provider: DISCONTINUED MED

## 2024-10-11 RX ORDER — TRAZODONE HYDROCHLORIDE 50 MG/1
TABLET, FILM COATED ORAL
Qty: 90 TABLET | Refills: 4 | Status: SHIPPED | OUTPATIENT
Start: 2024-10-11

## 2024-10-11 NOTE — PROGRESS NOTES
BMI Counseling: Body mass index is 47 31 kg/m²  Discussed the patient's BMI with her   The BMI {VB BMI Counselin} warm and dry

## 2024-10-15 ENCOUNTER — HOSPITAL ENCOUNTER (OUTPATIENT)
Dept: CT IMAGING | Facility: HOSPITAL | Age: 64
Discharge: HOME | End: 2024-10-15
Payer: MEDICARE

## 2024-10-15 DIAGNOSIS — R10.13 EPIGASTRIC PAIN: ICD-10-CM

## 2024-10-15 DIAGNOSIS — K76.9 LIVER LESION: ICD-10-CM

## 2024-10-15 PROCEDURE — 74177 CT ABD & PELVIS W/CONTRAST: CPT

## 2024-10-15 RX ADMIN — IOHEXOL 90 ML: 350 INJECTION, SOLUTION INTRAVENOUS at 08:13

## 2024-10-16 ENCOUNTER — TELEPHONE (OUTPATIENT)
Age: 64
End: 2024-10-16

## 2024-10-16 NOTE — TELEPHONE ENCOUNTER
Patients GI provider:  Dr. Meade    Number to return call: [phone]    Reason for call: Pt calling to because eh got a notice in her chart that the results are back on her CT scan completed yesterday (10.15.24) and she would like to speak with someone about them when possible. I let her know I would reach out and as soon as the Dr has a chance to read them over and advise we will call her back to discuss, she understands and is fine with that.    She would also like to let the Dr know that she attempted to do the blood work he requested she get but the lab told her there were no orders for her. I do not see any either so Pt would like to know if this is still something he wants and if so if he can put in the orders for her and let her know.

## 2024-10-22 ENCOUNTER — TELEPHONE (OUTPATIENT)
Dept: GASTROENTEROLOGY | Facility: CLINIC | Age: 64
End: 2024-10-22

## 2024-10-22 NOTE — TELEPHONE ENCOUNTER
Called pt regarding CT scan results. All questions answered. Pt reported some shortness of breath on occasion, recommended talking to PCP about this and recommended ED precautions if she develops persistent and symptomatic shortness of breath.    Alberto Meade MD  PGY-6 Gastroenterology Fellow

## 2024-10-27 PROBLEM — N39.0 URINARY TRACT INFECTION WITHOUT HEMATURIA: Status: RESOLVED | Noted: 2024-09-27 | Resolved: 2024-10-27

## 2024-11-11 ENCOUNTER — APPOINTMENT (OUTPATIENT)
Dept: LAB | Facility: HOSPITAL | Age: 64
End: 2024-11-11
Payer: MEDICARE

## 2024-11-11 ENCOUNTER — OFFICE VISIT (OUTPATIENT)
Dept: FAMILY MEDICINE CLINIC | Facility: CLINIC | Age: 64
End: 2024-11-11
Payer: MEDICARE

## 2024-11-11 ENCOUNTER — HOSPITAL ENCOUNTER (OUTPATIENT)
Dept: RADIOLOGY | Facility: HOSPITAL | Age: 64
Discharge: HOME | End: 2024-11-11
Payer: MEDICARE

## 2024-11-11 VITALS
OXYGEN SATURATION: 98 % | RESPIRATION RATE: 16 BRPM | SYSTOLIC BLOOD PRESSURE: 130 MMHG | DIASTOLIC BLOOD PRESSURE: 70 MMHG | BODY MASS INDEX: 38.88 KG/M2 | HEIGHT: 55 IN | HEART RATE: 88 BPM | WEIGHT: 168 LBS | TEMPERATURE: 98 F

## 2024-11-11 DIAGNOSIS — R06.09 DYSPNEA ON EXERTION: ICD-10-CM

## 2024-11-11 DIAGNOSIS — R07.1 CHEST PAIN ON BREATHING: ICD-10-CM

## 2024-11-11 DIAGNOSIS — E78.2 MIXED HYPERLIPIDEMIA: ICD-10-CM

## 2024-11-11 DIAGNOSIS — R07.1 CHEST PAIN ON BREATHING: Primary | ICD-10-CM

## 2024-11-11 DIAGNOSIS — Z23 ENCOUNTER FOR IMMUNIZATION: ICD-10-CM

## 2024-11-11 LAB
BASOPHILS # BLD AUTO: 0.05 THOUSANDS/ÂΜL (ref 0–0.1)
BASOPHILS NFR BLD AUTO: 1 % (ref 0–1)
BNP SERPL-MCNC: 29 PG/ML (ref 0–100)
D DIMER PPP FEU-MCNC: 0.69 UG/ML FEU
EOSINOPHIL # BLD AUTO: 0.17 THOUSAND/ÂΜL (ref 0–0.61)
EOSINOPHIL NFR BLD AUTO: 2 % (ref 0–6)
ERYTHROCYTE [DISTWIDTH] IN BLOOD BY AUTOMATED COUNT: 14 % (ref 11.6–15.1)
HCT VFR BLD AUTO: 35.7 % (ref 34.8–46.1)
HGB BLD-MCNC: 11.4 G/DL (ref 11.5–15.4)
IMM GRANULOCYTES # BLD AUTO: 0.03 THOUSAND/UL (ref 0–0.2)
IMM GRANULOCYTES NFR BLD AUTO: 0 % (ref 0–2)
LYMPHOCYTES # BLD AUTO: 2.11 THOUSANDS/ÂΜL (ref 0.6–4.47)
LYMPHOCYTES NFR BLD AUTO: 22 % (ref 14–44)
MCH RBC QN AUTO: 30.4 PG (ref 26.8–34.3)
MCHC RBC AUTO-ENTMCNC: 31.9 G/DL (ref 31.4–37.4)
MCV RBC AUTO: 95 FL (ref 82–98)
MONOCYTES # BLD AUTO: 0.57 THOUSAND/ÂΜL (ref 0.17–1.22)
MONOCYTES NFR BLD AUTO: 6 % (ref 4–12)
NEUTROPHILS # BLD AUTO: 6.69 THOUSANDS/ÂΜL (ref 1.85–7.62)
NEUTS SEG NFR BLD AUTO: 69 % (ref 43–75)
NRBC BLD AUTO-RTO: 0 /100 WBCS
PLATELET # BLD AUTO: 343 THOUSANDS/UL (ref 149–390)
PMV BLD AUTO: 10.5 FL (ref 8.9–12.7)
RBC # BLD AUTO: 3.75 MILLION/UL (ref 3.81–5.12)
WBC # BLD AUTO: 9.62 THOUSAND/UL (ref 4.31–10.16)

## 2024-11-11 PROCEDURE — 85379 FIBRIN DEGRADATION QUANT: CPT

## 2024-11-11 PROCEDURE — 99214 OFFICE O/P EST MOD 30 MIN: CPT | Performed by: FAMILY MEDICINE

## 2024-11-11 PROCEDURE — 71046 X-RAY EXAM CHEST 2 VIEWS: CPT

## 2024-11-11 PROCEDURE — 90662 IIV NO PRSV INCREASED AG IM: CPT

## 2024-11-11 PROCEDURE — 36415 COLL VENOUS BLD VENIPUNCTURE: CPT

## 2024-11-11 PROCEDURE — 83880 ASSAY OF NATRIURETIC PEPTIDE: CPT

## 2024-11-11 PROCEDURE — 94010 BREATHING CAPACITY TEST: CPT | Performed by: FAMILY MEDICINE

## 2024-11-11 PROCEDURE — G0008 ADMIN INFLUENZA VIRUS VAC: HCPCS

## 2024-11-11 PROCEDURE — 85025 COMPLETE CBC W/AUTO DIFF WBC: CPT

## 2024-11-11 PROCEDURE — 93000 ELECTROCARDIOGRAM COMPLETE: CPT | Performed by: FAMILY MEDICINE

## 2024-11-11 RX ORDER — ATORVASTATIN CALCIUM 40 MG/1
40 TABLET, FILM COATED ORAL DAILY
Qty: 100 TABLET | Refills: 5 | Status: SHIPPED | OUTPATIENT
Start: 2024-11-11

## 2024-11-11 NOTE — PROGRESS NOTES
Ambulatory Visit  Name: Tova Faulkner      : 1960      MRN: [id number]  Encounter Provider: Benjamín Shin MD  Encounter Date: 2024   Encounter department: [facility]    Assessment & Plan  Chest pain on breathing  Seems not cardiogenic pain. EKG showed no abnormalities.   Orders:    B-Type Natriuretic Peptide(BNP); Future    D-dimer, quantitative; Future    POCT ECG    Dyspnea on exertion  1. Dyspnea with Chest Pain - with deep inspiration. No in distress when sitting. She can take a flight of steps without having chest pain.  - Ordered stat D-dimer, CBC, and chest X-ray  - If D-dimer positive, will proceed with CT pulmonary angiogram  - Pulmonology consultation scheduled for   - Cardiology referral placed due to suspected orthopnea      Orders:    Ambulatory Referral to Cardiology; Future    XR chest pa and lateral; Future    CBC and differential; Future    POCT spirometry    Mixed hyperlipidemia    Orders:    atorvastatin (LIPITOR) 40 mg tablet; Take 1 tablet (40 mg total) by mouth daily    Encounter for immunization    Orders:    influenza vaccine, high-dose, PF 0.5 mL (Fluzone High Dose)       History of Present Illness     HPI  Chief Complaint   Patient presents with    discuss test results    Shortness of Breath    Diabetes    Hypertension    64-year-old female presents with complaints of difficulty breathing and significant chest pain. Patient describes sensation of chest pressure and inability to get air, particularly with exertion. Symptoms worsen with activity and talking, accompanied by productive cough with significant sputum production. Denies significant nocturnal symptoms but reports dyspnea with minimal exertion including walking and climbing stairs. Patient reports recent weight loss of 20 pounds (from 188 to 168 lbs). Recent CT scan from 10/15/24 showed bilateral basilar atelectasis, hepatic steatosis, cholelithiasis, and diverticulitis.  "Patient has upcoming pulmonology consultation scheduled.  History obtained from patient.    Review of Systems  Past Medical History:   Diagnosis Date    Back problem     herniated discs    Chronic pain disorder     back    Depression     Diabetes mellitus (HCC)     Diverticulitis     Diverticulosis     Gallstones     Hyperglycemia     Hyperlipidemia     Hypertension     Impacted cerumen     Obesity     Palpitations     Steatosis (HCC)      Past Surgical History:   Procedure Laterality Date    BREAST CYST EXCISION Left     CARPAL TUNNEL RELEASE Right 1983    DIAGNOSTIC LAPAROSCOPY      ESOPHAGOGASTRODUODENOSCOPY  10/2018    gastroduodenitis    MAMMO STEREOTACTIC BREAST BIOPSY LEFT (ALL INC) Left 11/08/2023    PERIPHERAL ANGIOGRAM      [place] COLONOSCOPY FLX DX W/COLLJ SPEC WHEN PFRMD N/A 09/12/2018    dr chowdhury, diverticulosis    [place] ESOPHAGOGASTRODUODENOSCOPY TRANSORAL DIAGNOSTIC N/A 10/10/2018    Procedure: EGD;  Surgeon: Greg Chowdhury DO;  Location:  GI LAB;  Service: General    UPPER GASTROINTESTINAL ENDOSCOPY  05/22/2024           Objective     /70 (BP Location: Left arm, Patient Position: Sitting, Cuff Size: Standard)   Pulse 88   Temp 98 °F (36.7 °C) (Tympanic)   Resp 16   Ht 4' 3\" (1.295 m)   Wt 76.2 kg (168 lb)   SpO2 98%   BMI 45.41 kg/m²     Physical Exam  Non distress, normal respiratory effort. Pulse is regular. Heart without murmurs.     I have spent 35 minutes with Tova today in which greater than 50% of this time was spent in counseling/coordination of care regarding Diagnostic results, Prognosis, Risks and benefits of tx options, Counseling / Coordination of care, Reviewing / ordering tests, medicine, procedures.  Please note this time includes cumulative time on the day of encounter, including reviewing medical records and/or coordinating care among the patient's other specialists.    "

## 2024-11-12 ENCOUNTER — HOSPITAL ENCOUNTER (OUTPATIENT)
Dept: CT IMAGING | Facility: HOSPITAL | Age: 64
Discharge: HOME | End: 2024-11-12
Payer: MEDICARE

## 2024-11-12 ENCOUNTER — TELEPHONE (OUTPATIENT)
Age: 64
End: 2024-11-12

## 2024-11-12 DIAGNOSIS — R07.1 CHEST PAIN ON BREATHING: Primary | ICD-10-CM

## 2024-11-12 DIAGNOSIS — R07.1 CHEST PAIN ON BREATHING: ICD-10-CM

## 2024-11-12 DIAGNOSIS — R06.09 DYSPNEA ON EXERTION: ICD-10-CM

## 2024-11-12 PROCEDURE — 71275 CT ANGIOGRAPHY CHEST: CPT

## 2024-11-12 RX ADMIN — IOHEXOL 100 ML: 350 INJECTION, SOLUTION INTRAVENOUS at 14:05

## 2024-11-12 NOTE — TELEPHONE ENCOUNTER
Patient calls to review her lab and imaging results. Please have PCP review the labs and x-ray and then discuss with the patient. Patient can be reached at [phone]

## 2024-11-12 NOTE — PROGRESS NOTES
Calling back patient  About her shortness of breath and chest pain that has been ongoing for the past 2 weeks. She was initially evaluated in office yesterday. Patient has multiple chronic conditions including chronic kidney disease stage 1, diabetes mellitus type 2, hypertension, Sever Obesity and hyperlipidemia. She has upcoming specialist appointments scheduled with cardiology in January 2025 and pulmonology at the end of November.    Yesterday's Diagnostic Studies:  - Chest X-ray: No abnormalities noted  - Laboratory findings:  * D-dimer: Positive  * CVC: Shows mild anemia  * Beta natriuretic peptide: Normal  - Spirometry: Normal values even with  poor patient effort noted      1. Shortness of Breath and Chest Pain - Concerning for possible Pulmonary Embolism vs Cardiac Etiology  - Ordering CT chest with PE protocol given positive D-dimer  - Ordering echocardiogram to assess cardiac structure and ejection fraction  - Concern for possible decreased ejection fraction in context of chronic conditions and obesity  - Will expedite if needed based on imaging results

## 2024-11-13 ENCOUNTER — TELEPHONE (OUTPATIENT)
Age: 64
End: 2024-11-13

## 2024-11-14 ENCOUNTER — RESULTS FOLLOW-UP (OUTPATIENT)
Dept: FAMILY MEDICINE CLINIC | Facility: CLINIC | Age: 64
End: 2024-11-14

## 2024-11-26 ENCOUNTER — OFFICE VISIT (OUTPATIENT)
Dept: PULMONOLOGY | Facility: CLINIC | Age: 64
End: 2024-11-26
Payer: MEDICARE

## 2024-11-26 VITALS
SYSTOLIC BLOOD PRESSURE: 140 MMHG | HEIGHT: 55 IN | OXYGEN SATURATION: 99 % | WEIGHT: 168.6 LBS | HEART RATE: 88 BPM | BODY MASS INDEX: 39.02 KG/M2 | TEMPERATURE: 97.3 F | DIASTOLIC BLOOD PRESSURE: 90 MMHG

## 2024-11-26 DIAGNOSIS — R05.3 CHRONIC COUGH: Primary | ICD-10-CM

## 2024-11-26 PROCEDURE — 99204 OFFICE O/P NEW MOD 45 MIN: CPT | Performed by: INTERNAL MEDICINE

## 2024-11-26 RX ORDER — BUDESONIDE AND FORMOTEROL FUMARATE DIHYDRATE 160; 4.5 UG/1; UG/1
2 AEROSOL RESPIRATORY (INHALATION) 2 TIMES DAILY
Qty: 10.2 G | Refills: 5 | Status: SHIPPED | OUTPATIENT
Start: 2024-11-26

## 2024-11-26 RX ORDER — ALBUTEROL SULFATE 90 UG/1
2 INHALANT RESPIRATORY (INHALATION) EVERY 6 HOURS PRN
Qty: 18 G | Refills: 5 | Status: SHIPPED | OUTPATIENT
Start: 2024-11-26

## 2024-11-26 NOTE — PROGRESS NOTES
Pulmonary Consultation   Tova Faulkner 64 y.o. female MRN: [id number]  11/26/2024      Assessment:  Chronic cough/episodes of dyspnea  Reports ongoing symptoms for few years  More with exertion but not all the times, sometimes with laying flat, or with deep breathing  DDx reversible airway disease/asthma given the positive family history,vs myocardial dysfunction/HFpEF  CT PE showed clear lung fields/no pulmonary embolism  Lifelong non-smoker, never been on inhalers before  Office spirometer 11/11-ratio 95%, FEV1 1.31 L / 97%, FVC 1.38 L / 83%, noted to have poor effort, restriction likely due to body habitus/obesity    Plan:  A trial of ICS/LABA, Symbicort 160/PRN albuterol  Educated about the proper inhaler use technique  Check complete pulmonary function test/BD response  Already scheduled for TTE by PCP, will follow-up    Return in about 3 months (around 2/26/2025).        History of Present Illness     New Consult for: chronic cough/dyspnea      Background  64 y.o. female with a h/o DM2, class IV obesity, lumbar radiculopathy, dyslipidemia, HTN, gastroparesis, CKD 1, depression.    Reports ongoing symptoms since approximately 2 years.  Episodic cough, frequent every day mostly dry, sometimes associated with white sputum.  Associated with exertion, not all the times, also noted orthopnea.  States that she underwent cardiac workup/sounds like a cardiac cath in [place] and was told it was normal.  Symptoms usually triggered by deep breathing, not triggered by environmental allergens, no PND or rhinorrhea.  Most recently seen by PCP,  noted elevated D-dimer for which a CT PE showed no pulmonary embolism, clear lung fields.      Reports no prior Hx of asthma, or COPD, lifelong non-smoker.  Had a +FH asthma in mother.  She is a lifelong non-smoker, nonalcoholic.  No prior significant exposure to hazards environmental/or acute patient.    Baseline, active/independent in ADLs, able to walk on a surface level long  distance,  climb a flight of stair is a little difficult.    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        Studies:  Imaging and other studies: I have personally reviewed pertinent films in PACS  CT PE 11/12/2024-clear lung fields, no endobronchial lesions.  No pulmonary embolism.    Pulmonary function testing:       EKG, Pathology, and Other Studies: I have personally reviewed pertinent reports.          Historical Information   Past Medical History:   Diagnosis Date    Back problem     herniated discs    Chronic pain disorder     back    Depression     Diabetes mellitus (HCC)     Diverticulitis     Diverticulosis     Gallstones     Hyperglycemia     Hyperlipidemia     Hypertension     Impacted cerumen     Obesity     Palpitations     Steatosis (HCC)      Past Surgical History:   Procedure Laterality Date    BREAST CYST EXCISION Left     CARPAL TUNNEL RELEASE Right 1983    DIAGNOSTIC LAPAROSCOPY      ESOPHAGOGASTRODUODENOSCOPY  10/2018    gastroduodenitis    MAMMO STEREOTACTIC BREAST BIOPSY LEFT (ALL INC) Left 11/08/2023    PERIPHERAL ANGIOGRAM      [place] COLONOSCOPY FLX DX W/COLLJ SPEC WHEN PFRMD N/A 09/12/2018    dr chowdhury, diverticulosis    [place] ESOPHAGOGASTRODUODENOSCOPY TRANSORAL DIAGNOSTIC N/A 10/10/2018    Procedure: EGD;  Surgeon: Greg Chowdhury DO;  Location:  GI LAB;  Service: General    UPPER GASTROINTESTINAL ENDOSCOPY  05/22/2024     Family History   Problem Relation Age of Onset    Kidney disease Mother     Hypertension Mother     Coronary artery disease Father         premature    Diabetes Sister     Coronary artery disease Sister     No Known  "Problems Daughter     No Known Problems Maternal Grandmother     Coronary artery disease Maternal Grandfather     Diabetes Paternal Grandfather     Coronary artery disease Paternal Grandfather     Breast cancer Paternal Aunt 40         Medications/Allergies: Reviewed    Vitals: Blood pressure 140/90, pulse 88, temperature (!) 97.3 °F (36.3 °C), temperature source Tympanic, height 4' 3\" (1.295 m), weight 76.5 kg (168 lb 9.6 oz), SpO2 99%, not currently breastfeeding. Body mass index is 45.57 kg/m². Oxygen Therapy  SpO2: 99 %  Oxygen Therapy: None (Room air)      Physical Exam  Body mass index is 45.57 kg/m².   Gen: not in acute distress, obese  Neck/Eyes: supple, no adenopathy, PERRL  Ear: normal appearance, no significant hearing impairment  Nose:  normal nasal mucosa, no drainage  Mouth:  unremarkable/normal appearance of lips, teeth and gums  Oropharynx: mucosa is moist, no focal lesions or erythema  Salivary glands: soft nontender  Chest: normal respiratory efforts, diminished but clear breath sounds bilaterally  CV: RRR, no murmurs appreciated, no peripheral edema  Abdomen: soft, non tender  Extremities:  No observed deformity   Skin: unremarkable  Neuro: AAO X3, no focal motor deficit         Labs:  Lab Results   Component Value Date    WBC 9.62 11/11/2024    HGB 11.4 (L) 11/11/2024    HCT 35.7 11/11/2024    MCV 95 11/11/2024     11/11/2024     Lab Results   Component Value Date    CALCIUM 9.8 09/10/2024    K 4.1 09/10/2024    CO2 26 09/10/2024     09/10/2024    BUN 10 09/10/2024    CREATININE 0.71 09/10/2024     No results found for: \"IGE\"  Lab Results   Component Value Date    ALT 17 09/10/2024    AST 15 09/10/2024    ALKPHOS 67 09/10/2024           Portions of the record may have been created with voice recognition software.  Occasional wrong word or \"sound a like\" substitutions may have occurred due to the inherent limitations of voice recognition software.  Read the chart carefully and " recognize, using context, where substitutions have occurred    Lisa Martinez M.D.  [facility]

## 2024-12-02 RX ORDER — ALBUTEROL SULFATE 0.83 MG/ML
2.5 SOLUTION RESPIRATORY (INHALATION) ONCE AS NEEDED
Status: COMPLETED | OUTPATIENT
Start: 2024-12-02 | End: 2024-12-06

## 2024-12-03 DIAGNOSIS — E11.29 TYPE 2 DIABETES MELLITUS WITH MICROALBUMINURIA, WITHOUT LONG-TERM CURRENT USE OF INSULIN (HCC): ICD-10-CM

## 2024-12-03 DIAGNOSIS — R80.9 TYPE 2 DIABETES MELLITUS WITH MICROALBUMINURIA, WITHOUT LONG-TERM CURRENT USE OF INSULIN (HCC): ICD-10-CM

## 2024-12-04 RX ORDER — ALCOHOL ANTISEPTIC PADS
PADS, MEDICATED (EA) TOPICAL
Qty: 100 EACH | Refills: 4 | Status: SHIPPED | OUTPATIENT
Start: 2024-12-04

## 2024-12-06 ENCOUNTER — HOSPITAL ENCOUNTER (OUTPATIENT)
Dept: PULMONOLOGY | Facility: HOSPITAL | Age: 64
End: 2024-12-06
Attending: INTERNAL MEDICINE
Payer: MEDICARE

## 2024-12-06 DIAGNOSIS — R05.3 CHRONIC COUGH: ICD-10-CM

## 2024-12-06 PROCEDURE — 94760 N-INVAS EAR/PLS OXIMETRY 1: CPT

## 2024-12-06 PROCEDURE — 94729 DIFFUSING CAPACITY: CPT

## 2024-12-06 PROCEDURE — 94060 EVALUATION OF WHEEZING: CPT | Performed by: INTERNAL MEDICINE

## 2024-12-06 PROCEDURE — 94726 PLETHYSMOGRAPHY LUNG VOLUMES: CPT

## 2024-12-06 PROCEDURE — 94726 PLETHYSMOGRAPHY LUNG VOLUMES: CPT | Performed by: INTERNAL MEDICINE

## 2024-12-06 PROCEDURE — 94729 DIFFUSING CAPACITY: CPT | Performed by: INTERNAL MEDICINE

## 2024-12-06 PROCEDURE — 94060 EVALUATION OF WHEEZING: CPT

## 2024-12-06 RX ADMIN — ALBUTEROL SULFATE 2.5 MG: 2.5 SOLUTION RESPIRATORY (INHALATION) at 09:25

## 2024-12-12 ENCOUNTER — HOSPITAL ENCOUNTER (OUTPATIENT)
Dept: NON INVASIVE DIAGNOSTICS | Facility: HOSPITAL | Age: 64
Discharge: HOME | End: 2024-12-12
Payer: MEDICARE

## 2024-12-12 VITALS
HEART RATE: 78 BPM | BODY MASS INDEX: 38.88 KG/M2 | DIASTOLIC BLOOD PRESSURE: 90 MMHG | WEIGHT: 168 LBS | HEIGHT: 55 IN | SYSTOLIC BLOOD PRESSURE: 140 MMHG

## 2024-12-12 DIAGNOSIS — R06.09 DYSPNEA ON EXERTION: ICD-10-CM

## 2024-12-12 LAB
AORTIC ROOT: 3 CM
APICAL FOUR CHAMBER EJECTION FRACTION: 65 %
BSA FOR ECHO PROCEDURE: 1.54 M2
E WAVE DECELERATION TIME: 237 MS
E/A RATIO: 0.64
FRACTIONAL SHORTENING: 36 (ref 28–44)
INTERVENTRICULAR SEPTUM IN DIASTOLE (PARASTERNAL SHORT AXIS VIEW): 1.1 CM
INTERVENTRICULAR SEPTUM: 1.1 CM (ref 0.6–1.1)
LAAS-AP2: 13.4 CM2
LAAS-AP4: 13.9 CM2
LEFT ATRIUM SIZE: 3.9 CM
LEFT ATRIUM VOLUME (MOD BIPLANE): 33 ML
LEFT ATRIUM VOLUME INDEX (MOD BIPLANE): 21.4 ML/M2
LEFT INTERNAL DIMENSION IN SYSTOLE: 2.8 CM (ref 2.1–4)
LEFT VENTRICULAR INTERNAL DIMENSION IN DIASTOLE: 4.4 CM (ref 3.5–6)
LEFT VENTRICULAR POSTERIOR WALL IN END DIASTOLE: 1.1 CM
LEFT VENTRICULAR STROKE VOLUME: 60 ML
LVSV (TEICH): 60 ML
MV E'TISSUE VEL-SEP: 7 CM/S
MV PEAK A VEL: 0.73 M/S
MV PEAK E VEL: 47 CM/S
MV STENOSIS PRESSURE HALF TIME: 69 MS
MV VALVE AREA P 1/2 METHOD: 3.19
RA PRESSURE ESTIMATED: 5 MMHG
RIGHT ATRIUM AREA SYSTOLE A4C: 10.2 CM2
RIGHT VENTRICLE ID DIMENSION: 3 CM
RV PSP: 27 MMHG
SL CV LEFT ATRIUM LENGTH A2C: 4.5 CM
SL CV LV EF: 65
SL CV PED ECHO LEFT VENTRICLE DIASTOLIC VOLUME (MOD BIPLANE) 2D: 89 ML
SL CV PED ECHO LEFT VENTRICLE SYSTOLIC VOLUME (MOD BIPLANE) 2D: 29 ML
TR MAX PG: 22 MMHG
TR PEAK VELOCITY: 2.3 M/S
TRICUSPID ANNULAR PLANE SYSTOLIC EXCURSION: 2.2 CM
TRICUSPID VALVE PEAK REGURGITATION VELOCITY: 2.32 M/S

## 2024-12-12 PROCEDURE — 93306 TTE W/DOPPLER COMPLETE: CPT | Performed by: STUDENT IN AN ORGANIZED HEALTH CARE EDUCATION/TRAINING PROGRAM

## 2024-12-12 PROCEDURE — 93306 TTE W/DOPPLER COMPLETE: CPT

## 2024-12-13 ENCOUNTER — TELEPHONE (OUTPATIENT)
Age: 64
End: 2024-12-13

## 2024-12-13 NOTE — TELEPHONE ENCOUNTER
Pt called and is very concerned with her ECHO results and would like to discuss her results with PCP. I tried to get her on the schedule, however, she only wants to see PCP.       She would like a call to go over her results and/or if she can be added to Dr. Shin' schedule at all.    Please advise and f/u with pt as necessary.

## 2024-12-14 DIAGNOSIS — E11.65 UNCONTROLLED TYPE 2 DIABETES MELLITUS WITH HYPERGLYCEMIA (HCC): ICD-10-CM

## 2024-12-15 NOTE — RESULT ENCOUNTER NOTE
Robb Bernardo, The Echo showed:    - Wall Thickness: The walls of the left ventricle are a little thicker than usual. This can happen if the heart is working harder due to obesity and high blood pressure.  - Ejection Fraction: This is a measure of how well the heart pumps blood. A 65% ejection fraction is good, meaning the heart is pumping well.  - Systolic Function: This means how well the heart contracts to pump blood. It is normal, so the heart is doing its job.  - Diastolic Function: This is about how well the heart relaxes and fills with blood. It is slightly abnormal, which means it might not be relaxing as well as it should.        - Tricuspid Valve: This valve has a small amount of blood leaking back (mild regurgitation), but everything else looks normal.  - Mitral Valve: This valve also has a tiny bit of leakage (trace regurgitation), but it's not a big problem.  - Aortic Valve: This valve has three parts (trileaflet) and is a little thickened and calcified, but it opens and closes normally without any significant problems.    In Summary:  Overall, the heart is functioning well, with normal pumping ability and size in most areas. There are some mild issues with the thickness of the left ventricle and slight leakage in a couple of valves, but nothing too serious. The heart is doing a good job of pumping blood!  These finding can not explain the Shortness of breath.

## 2024-12-16 ENCOUNTER — OFFICE VISIT (OUTPATIENT)
Dept: CARDIOLOGY CLINIC | Facility: CLINIC | Age: 64
End: 2024-12-16
Payer: MEDICARE

## 2024-12-16 VITALS
WEIGHT: 167.1 LBS | HEIGHT: 55 IN | DIASTOLIC BLOOD PRESSURE: 76 MMHG | BODY MASS INDEX: 38.67 KG/M2 | OXYGEN SATURATION: 99 % | HEART RATE: 80 BPM | SYSTOLIC BLOOD PRESSURE: 136 MMHG

## 2024-12-16 DIAGNOSIS — G47.33 OSA (OBSTRUCTIVE SLEEP APNEA): ICD-10-CM

## 2024-12-16 DIAGNOSIS — E11.65 UNCONTROLLED TYPE 2 DIABETES MELLITUS WITH HYPERGLYCEMIA (HCC): ICD-10-CM

## 2024-12-16 DIAGNOSIS — E78.00 PURE HYPERCHOLESTEROLEMIA: ICD-10-CM

## 2024-12-16 DIAGNOSIS — I10 ESSENTIAL HYPERTENSION: Primary | ICD-10-CM

## 2024-12-16 DIAGNOSIS — R07.9 CHEST PAIN, UNSPECIFIED TYPE: ICD-10-CM

## 2024-12-16 DIAGNOSIS — R06.09 DYSPNEA ON EXERTION: ICD-10-CM

## 2024-12-16 PROCEDURE — 99204 OFFICE O/P NEW MOD 45 MIN: CPT | Performed by: INTERNAL MEDICINE

## 2024-12-16 RX ORDER — BLOOD-GLUCOSE METER
KIT MISCELLANEOUS
Qty: 300 STRIP | Refills: 1 | Status: SHIPPED | OUTPATIENT
Start: 2024-12-16

## 2024-12-16 NOTE — PROGRESS NOTES
Cardiology Consultation     Tova Faulkner  [id number]  1960  HEART & VASCULAR [facility] [place]  [address] [address]  1. Essential hypertension  NM myocardial perfusion spect (rx stress and/or rest)      2. Dyspnea on exertion  Ambulatory Referral to Cardiology    NM myocardial perfusion spect (rx stress and/or rest)      3. MAGGIE (obstructive sleep apnea)  NM myocardial perfusion spect (rx stress and/or rest)      4. Uncontrolled type 2 diabetes mellitus with hyperglycemia (HCC)  NM myocardial perfusion spect (rx stress and/or rest)      5. Pure hypercholesterolemia  NM myocardial perfusion spect (rx stress and/or rest)      6. Chest pain, unspecified type  NM myocardial perfusion spect (rx stress and/or rest)        Patient Active Problem List   Diagnosis   • Essential hypertension   • Breast cancer screening by mammogram   • Hyperlipidemia   • LFT elevation   • Chronic low back pain with right-sided sciatica   • Uncontrolled type 2 diabetes mellitus with hyperglycemia (HCC)   • Diabetic gastroparesis associated with type 2 diabetes mellitus  (HCC)   • Need for shingles vaccine   • Anxiety   • Left foot pain   • Morbid obesity with BMI of 45.0-49.9, adult (HCC)   • Localized edema   • Thoracic spine pain   • Acute right-sided low back pain without sciatica   • Pain in right upper arm   • CKD (chronic kidney disease) stage 1, GFR 90 ml/min or greater   • Persistent proteinuria   • Depression, recurrent (HCC)   • Type 2 diabetes mellitus with microalbuminuria, without long-term current use of insulin (HCC)   • Neuralgia of right thigh   • Acute pain of right knee   • MAGGIE (obstructive sleep apnea)   • Radiculopathy, lumbar region   • Vitamin D deficiency   • Hyperphosphatemia   • Menopause   • Chronic cough       HPI patient is here for a cardiology evaluation.  Patient has HTN, HLD and DM.  Patient has MAGGIE.  Echocardiogram  2024 demonstrated LVEF of 65%.  There was mild LVH.  There was mild TR with normal NE pressure.  Patient has had some issues with SOB, cough and left-sided chest discomfort.  She has had this discomfort for about a year.  CTA of the chest 2024 demonstrated no pulmonary embolism.  Anomalous cardiac left atrial band was noted.  Heart and great vessels looked unremarkable for patient's age.  Patient admits to having cardiac catheterization in [place] in 2016 and was told everything looked fine.  She is a non-smoker.  In reference to family history her mother had 3 heart attacks but  at the age of 83 from kidney problems.  Her maternal grandfather  suddenly of a heart attack at the age of 80.  EKG 2024 demonstrated sinus rhythm with LVH and PRWP.    PMH-  Past Medical History:   Diagnosis Date   • Back problem     herniated discs   • Chronic pain disorder     back   • Depression    • Diabetes mellitus (HCC)    • Diverticulitis    • Diverticulosis    • Gallstones    • Hyperglycemia    • Hyperlipidemia    • Hypertension    • Impacted cerumen    • Obesity    • Palpitations    • Steatosis (HCC)         SOCIAL HISTORY-  Social History     Socioeconomic History   • Marital status:      Spouse name: Not on file   • Number of children: Not on file   • Years of education: Not on file   • Highest education level: Not on file   Occupational History   • Not on file   Tobacco Use   • Smoking status: Never   • Smokeless tobacco: Never   Vaping Use   • Vaping status: Never Used   Substance and Sexual Activity   • Alcohol use: No   • Drug use: No   • Sexual activity: Not Currently     Partners: Male   Other Topics Concern   • Not on file   Social History Narrative   • Not on file     Social Drivers of Health     Financial Resource Strain: Low Risk  (2024)    Overall Financial Resource Strain (CARDIA)    • Difficulty of Paying Living Expenses: Not hard at all   Food Insecurity: Not on file    Transportation Needs: No Transportation Needs (2/21/2024)    PRAPARE - Transportation    • Lack of Transportation (Medical): No    • Lack of Transportation (Non-Medical): No   Physical Activity: Not on file   Stress: Not on file   Social Connections: Not on file   Intimate Partner Violence: Not on file   Housing Stability: Not on file        FAMILY HISTORY-  Family History   Problem Relation Age of Onset   • Kidney disease Mother    • Hypertension Mother    • Coronary artery disease Father         premature   • Diabetes Sister    • Coronary artery disease Sister    • No Known Problems Daughter    • No Known Problems Maternal Grandmother    • Coronary artery disease Maternal Grandfather    • Diabetes Paternal Grandfather    • Coronary artery disease Paternal Grandfather    • Breast cancer Paternal Aunt 40       SURGICAL HISTORY-  Past Surgical History:   Procedure Laterality Date   • BREAST CYST EXCISION Left    • CARPAL TUNNEL RELEASE Right 1983   • DIAGNOSTIC LAPAROSCOPY     • ESOPHAGOGASTRODUODENOSCOPY  10/2018    gastroduodenitis   • MAMMO STEREOTACTIC BREAST BIOPSY LEFT (ALL INC) Left 11/08/2023   • PERIPHERAL ANGIOGRAM     • [place] COLONOSCOPY FLX DX W/COLLJ SPEC WHEN PFRMD N/A 09/12/2018    dr chowdhury, diverticulosis   • [place] ESOPHAGOGASTRODUODENOSCOPY TRANSORAL DIAGNOSTIC N/A 10/10/2018    Procedure: EGD;  Surgeon: Greg Chowdhury DO;  Location:  GI LAB;  Service: General   • UPPER GASTROINTESTINAL ENDOSCOPY  05/22/2024         Current Outpatient Medications:   •  atorvastatin (LIPITOR) 40 mg tablet, Take 1 tablet (40 mg total) by mouth daily, Disp: 100 tablet, Rfl: 5  •  ergocalciferol (VITAMIN D2) 50,000 units, Take 1 capsule (50,000 Units total) by mouth once a week, Disp: 12 capsule, Rfl: 7  •  ezetimibe (ZETIA) 10 mg tablet, Take 1 tablet (10 mg total) by mouth daily, Disp: 100 tablet, Rfl: 3  •  losartan (COZAAR) 50 mg tablet, Take 1 tablet (50 mg total) by mouth 2 (two) times a day, Disp: 200 tablet, Rfl:  "3  •  metFORMIN (GLUCOPHAGE) 500 mg tablet, Take 1 tablet (500 mg total) by mouth 2 (two) times a day with meals, Disp: 180 tablet, Rfl: 1  •  NIFEdipine (PROCARDIA XL) 30 mg 24 hr tablet, Take 1 tablet (30 mg total) by mouth daily at bedtime, Disp: 100 tablet, Rfl: 3  •  Ozempic, 0.25 or 0.5 MG/DOSE, 2 MG/1.5ML injection pen, , Disp: , Rfl:   •  albuterol (Ventolin HFA) 90 mcg/act inhaler, Inhale 2 puffs every 6 (six) hours as needed for wheezing (Patient not taking: Reported on 12/16/2024), Disp: 18 g, Rfl: 5  •  budesonide-formoterol (Symbicort) 160-4.5 mcg/act inhaler, Inhale 2 puffs 2 (two) times a day Rinse mouth after use. (Patient not taking: Reported on 12/16/2024), Disp: 10.2 g, Rfl: 5  •  Canagliflozin-metFORMIN HCl  MG TABS, , Disp: , Rfl:   •  Comfort Touch Plus Lancets 30G MISC, USE TO TEST THE BLOOD SUGAR EVERY MORNING, Disp: 100 each, Rfl: 1  •  glucose blood (FREESTYLE LITE) test strip, USE TO TEST THE BLOOD SUGAR THREE TIMES A DAY Dx: E11.65, Disp: 300 strip, Rfl: 3  •  semaglutide, 2 mg/dose, (Ozempic, 2 MG/DOSE,) 8 mg/ mL injection pen, Inject 0.75 mL (2 mg total) under the skin every 7 days (Patient not taking: Reported on 12/16/2024), Disp: 3 mL, Rfl: 5  •  UltiCare Alcohol Swabs 70 % PADS, USE TO CLEAN THE AREAS BEFORE TESTING BLOOD SUGAR OR/AND INJECTING INSULIN THREE TIMES A DAY AS DIRECTED, Disp: 100 each, Rfl: 4  Allergies   Allergen Reactions   • Farxiga [Dapagliflozin] Angioedema   • Vicodin [Hydrocodone-Acetaminophen] Angioedema   • Percocet [Oxycodone-Acetaminophen] GI Intolerance   • Gabapentin GI Intolerance     vomiting   • Lyrica [Pregabalin] GI Intolerance     Stomach ache.   • Aspirin GI Intolerance   • Diclofenac Sodium Rash     Vitals:    12/16/24 0822   BP: 136/76   BP Location: Left arm   Patient Position: Sitting   Cuff Size: Standard   Pulse: 80   SpO2: 99%   Weight: 75.8 kg (167 lb 1.6 oz)   Height: 4' 3\" (1.295 m)         Review of Systems:  Review of Systems "   Respiratory:  Positive for cough and shortness of breath.    Cardiovascular:  Positive for chest pain.       Physical Exam:  Physical Exam  Vitals reviewed.   Constitutional:       Appearance: She is well-developed.   HENT:      Head: Normocephalic and atraumatic.   Eyes:      Conjunctiva/sclera: Conjunctivae normal.      Pupils: Pupils are equal, round, and reactive to light.   Cardiovascular:      Rate and Rhythm: Normal rate.      Heart sounds: Normal heart sounds.   Pulmonary:      Effort: Pulmonary effort is normal.      Breath sounds: Normal breath sounds.   Musculoskeletal:      Cervical back: Normal range of motion and neck supple.   Skin:     General: Skin is warm and dry.   Neurological:      Mental Status: She is alert and oriented to person, place, and time.         Discussion/Summary: Patient presents with shortness of breath, cough and left-sided chest discomfort.  Her chest discomfort is reproducible with pressure.  She is very concerned about a cardiac diagnosis given her family history.  She cannot walk on a treadmill as she uses a cane.  Will check a pharmacologic nuclear stress test to exclude coronary ischemia.  She does have risk factors for coronary artery disease.  I have asked her to call if there is a problem in the interim.  I will see her in follow-up in 4 to 6 months and sooner as is necessary.

## 2024-12-18 ENCOUNTER — TELEPHONE (OUTPATIENT)
Dept: LAB | Facility: HOSPITAL | Age: 64
End: 2024-12-18

## 2024-12-21 ENCOUNTER — APPOINTMENT (OUTPATIENT)
Dept: LAB | Facility: HOSPITAL | Age: 64
End: 2024-12-21
Payer: MEDICARE

## 2024-12-21 DIAGNOSIS — R80.1 PERSISTENT PROTEINURIA: ICD-10-CM

## 2024-12-21 DIAGNOSIS — N18.1 CKD (CHRONIC KIDNEY DISEASE) STAGE 1, GFR 90 ML/MIN OR GREATER: ICD-10-CM

## 2024-12-21 LAB
25(OH)D3 SERPL-MCNC: 48.8 NG/ML (ref 30–100)
ALBUMIN SERPL BCG-MCNC: 4.6 G/DL (ref 3.5–5)
ANION GAP SERPL CALCULATED.3IONS-SCNC: 11 MMOL/L (ref 4–13)
BUN SERPL-MCNC: 12 MG/DL (ref 5–25)
CALCIUM SERPL-MCNC: 9.9 MG/DL (ref 8.4–10.2)
CHLORIDE SERPL-SCNC: 102 MMOL/L (ref 96–108)
CO2 SERPL-SCNC: 25 MMOL/L (ref 21–32)
CREAT SERPL-MCNC: 0.71 MG/DL (ref 0.6–1.3)
CREAT UR-MCNC: 91.9 MG/DL
GFR SERPL CREATININE-BSD FRML MDRD: 89 ML/MIN/1.73SQ M
GLUCOSE P FAST SERPL-MCNC: 106 MG/DL (ref 65–99)
MICROALBUMIN UR-MCNC: 77.8 MG/L
MICROALBUMIN/CREAT 24H UR: 85 MG/G CREATININE (ref 0–30)
PHOSPHATE SERPL-MCNC: 3.7 MG/DL (ref 2.3–4.1)
POTASSIUM SERPL-SCNC: 3.7 MMOL/L (ref 3.5–5.3)
SODIUM SERPL-SCNC: 138 MMOL/L (ref 135–147)

## 2024-12-23 ENCOUNTER — RESULTS FOLLOW-UP (OUTPATIENT)
Dept: OTHER | Facility: HOSPITAL | Age: 64
End: 2024-12-23

## 2024-12-23 NOTE — TELEPHONE ENCOUNTER
----- Message from RITA Beaver sent at 12/23/2024  8:21 AM EST -----  Please let patient know that updated blood work has been reviewed and renal function remained stable.  Please make sure patient has follow-up appointment last seen earlier this year with Dr. Mireles.  Change in current treatment

## 2024-12-23 NOTE — TELEPHONE ENCOUNTER
Called patient and went over the following information:    Patients updated blood work has been reviewed and renal function remained stable.  Please make sure patient has follow-up appointment last seen earlier this year with Dr. Mireles. No change in current treatment.    I asked the patient to please call back with further questions.

## 2025-01-03 DIAGNOSIS — E78.2 MIXED HYPERLIPIDEMIA: ICD-10-CM

## 2025-01-04 RX ORDER — EZETIMIBE 10 MG/1
10 TABLET ORAL DAILY
Qty: 30 TABLET | Refills: 4 | Status: SHIPPED | OUTPATIENT
Start: 2025-01-04

## 2025-01-07 ENCOUNTER — RA CDI HCC (OUTPATIENT)
Dept: OTHER | Facility: HOSPITAL | Age: 65
End: 2025-01-07

## 2025-01-07 NOTE — PROGRESS NOTES
HCC coding opportunities          Chart Reviewed number of suggestions sent to Provider: 1     Patients Insurance     Medicare Insurance: [employer]          This is a reminder to assess all HCC (risk adjustment) codes for the year 2025 as patients KAY scores reset to zero with the New year.      E11.22: Type 2 diabetes mellitus with diabetic chronic kidney disease (HCC)     As per ICD 10 coding guidelines, DM & CKD are presumed to have a causal-effect relationship unless documented as unrelated please review and assess if applicable

## 2025-01-13 RX ORDER — TRAZODONE HYDROCHLORIDE 50 MG/1
TABLET, FILM COATED ORAL
COMMUNITY
Start: 2025-01-03

## 2025-01-14 ENCOUNTER — OFFICE VISIT (OUTPATIENT)
Dept: FAMILY MEDICINE CLINIC | Facility: CLINIC | Age: 65
End: 2025-01-14
Payer: MEDICARE

## 2025-01-14 VITALS
OXYGEN SATURATION: 98 % | TEMPERATURE: 98 F | HEIGHT: 55 IN | RESPIRATION RATE: 16 BRPM | BODY MASS INDEX: 38.18 KG/M2 | HEART RATE: 88 BPM | SYSTOLIC BLOOD PRESSURE: 130 MMHG | DIASTOLIC BLOOD PRESSURE: 80 MMHG | WEIGHT: 165 LBS

## 2025-01-14 DIAGNOSIS — M65.331 TRIGGER FINGER OF ALL DIGITS OF RIGHT HAND: ICD-10-CM

## 2025-01-14 DIAGNOSIS — M65.351 TRIGGER FINGER OF ALL DIGITS OF RIGHT HAND: ICD-10-CM

## 2025-01-14 DIAGNOSIS — M54.2 CERVICALGIA: ICD-10-CM

## 2025-01-14 DIAGNOSIS — I10 ESSENTIAL HYPERTENSION: ICD-10-CM

## 2025-01-14 DIAGNOSIS — M65.341 TRIGGER FINGER OF ALL DIGITS OF RIGHT HAND: ICD-10-CM

## 2025-01-14 DIAGNOSIS — M65.311 TRIGGER FINGER OF ALL DIGITS OF RIGHT HAND: ICD-10-CM

## 2025-01-14 DIAGNOSIS — E11.29 TYPE 2 DIABETES MELLITUS WITH MICROALBUMINURIA, WITHOUT LONG-TERM CURRENT USE OF INSULIN (HCC): Primary | ICD-10-CM

## 2025-01-14 DIAGNOSIS — F33.9 DEPRESSION, RECURRENT (HCC): ICD-10-CM

## 2025-01-14 DIAGNOSIS — E66.01 MORBID OBESITY WITH BMI OF 45.0-49.9, ADULT (HCC): ICD-10-CM

## 2025-01-14 DIAGNOSIS — M65.321 TRIGGER FINGER OF ALL DIGITS OF RIGHT HAND: ICD-10-CM

## 2025-01-14 DIAGNOSIS — R80.9 TYPE 2 DIABETES MELLITUS WITH MICROALBUMINURIA, WITHOUT LONG-TERM CURRENT USE OF INSULIN (HCC): Primary | ICD-10-CM

## 2025-01-14 PROCEDURE — 99215 OFFICE O/P EST HI 40 MIN: CPT | Performed by: FAMILY MEDICINE

## 2025-01-14 NOTE — ASSESSMENT & PLAN NOTE
Lab Results   Component Value Date    HGBA1C 6.4 09/10/2024   Tova is  showing improved control of hyperglycemia, as indicated by the HbA1C percentage mentioned above. He expressed concerns about statin therapy and kidney protection treatments. We discussed our shared treatment goals, which she accepted. The primary objective is to reduce the risk of vascular disease and its complications. I explained the potential complications of uncontrolled diabetes and the symptoms of hypoglycemia. We emphasized the importance of follow-up with diabetes educators and making lifestyle modifications. I encouraged her to remain compliant with his treatment plan and to call if he experiences any side effects. Additionally, I requested that he bring a blood sugar logbook to his next appointment.

## 2025-01-14 NOTE — ASSESSMENT & PLAN NOTE
Denies suicide ideations  Continue current treatment and follow up with Mental Health team.

## 2025-01-14 NOTE — PROGRESS NOTES
Name: Tova Faulkner      : 1960      MRN: [id number]  Encounter Provider: Benjamín Shin MD  Encounter Date: 2025   Encounter department: [facility] PRIMARY CARE [facility]    Assessment & Plan  Type 2 diabetes mellitus with microalbuminuria, without long-term current use of insulin (HCC)    Lab Results   Component Value Date    HGBA1C 6.4 09/10/2024   Tova is  showing improved control of hyperglycemia, as indicated by the HbA1C percentage mentioned above. He expressed concerns about statin therapy and kidney protection treatments. We discussed our shared treatment goals, which she accepted. The primary objective is to reduce the risk of vascular disease and its complications. I explained the potential complications of uncontrolled diabetes and the symptoms of hypoglycemia. We emphasized the importance of follow-up with diabetes educators and making lifestyle modifications. I encouraged her to remain compliant with his treatment plan and to call if he experiences any side effects. Additionally, I requested that he bring a blood sugar logbook to his next appointment.            Cervicalgia  Cervical Radiculopathy with Left Upper Extremity Symptoms:  Referring to Physical Therapy for cervical spine and left upper extremity  Symptoms likely related to cervical radiculopathy given distribution  Orders:    Ambulatory Referral to Physical Therapy; Future    Mild Depression, recurrent ([facility])  Denies suicide ideations  Continue current treatment and follow up with Mental Health team.           Morbid obesity with BMI of 45.0-49.9, adult ([facility])  Prior Authorization Clinical Questions for Weight Management Pharmacotherapy                   Trigger finger of all digits of right hand  Right Trigger Finger:  Referring to Hand Specialist for evaluation and possible steroid injection  Patient educated about risk of permanent contracture without treatment    Orders:    Ambulatory Referral to Physical  Therapy; Future    Ambulatory Referral to Orthopedic Surgery; Future    Essential hypertension  The patient's blood pressure is well controlled, so they will continue with the same treatment. The patient understands the risks associated with high blood pressure (HTN) and the need for adequate control and adherence to therapy. I explained to the patient that therapeutic measures involve lifelong lifestyle modifications, including: - Sodium reduction to less than 2 g/day - Following the Dietary Approaches to Stop Hypertension (DASH) diet, which includes 3 servings of fruits and vegetables daily, whole grains, low sodium, and low-fat proteins - Weight loss to achieve a BMI under 30 kg/m^2 - Engaging in physical activity: 3 to 5 times per week of daily aerobic exercise for 30- to 50-minute sessions, as tolerated - Avoiding alcohol consumption.                     Subjective       History of Present Illness.:  Interview conducted in Maltese. Patient presents for routine follow-up of multiple conditions.    Patient reports burning sensation in left forearm and hand, associated with neck pain. Symptoms include paresthesias and pain radiating from neck down the left arm. Patient has tried various home remedies without relief.    Additionally complaining of right hand trigger finger symptoms with palpable nodules and catching sensation.    Recent labs show elevated creatinine and albumin levels, expressing concern about kidney function.    Diabetes is currently managed with Ozempic, reporting current dose at 165 units.    Subjective.:  64-year-old  female with past medical history of Type 2 Diabetes Mellitus, Hypertension, and Chronic Kidney Disease presents for routine follow-up.    Chief complaints include left upper extremity burning sensation with associated neck pain, and right hand trigger finger symptoms.    Reports compliance with medications including Losartan and Ozempic.    Denies any other acute concerns.  "Recent dental work in progress.    Physical Exam::/80 (BP Location: Left arm, Patient Position: Sitting, Cuff Size: Standard)   Pulse 88   Temp 98 °F (36.7 °C) (Tympanic)   Resp 16   Ht 4' 3\" (1.295 m)   Wt 74.8 kg (165 lb)   SpO2 98%   BMI 44.60 kg/m²     General: Alert and oriented, no acute distress, normal respiratory effort  Cardiovascular: Regular rate and rhythm, no murmurs  Musculoskeletal:  - Cervical spine: Tenderness noted  - Left upper extremity: Sensory changes consistent with cervical radiculopathy  - Right hand: Palpable nodule and triggering of affected digit  Neurological: Sensory changes in left upper extremity following cervical dermatomal pattern          Benjamín Shin MD   [facility]    "

## 2025-01-16 NOTE — ASSESSMENT & PLAN NOTE
The patient's blood pressure is well controlled, so they will continue with the same treatment. The patient understands the risks associated with high blood pressure (HTN) and the need for adequate control and adherence to therapy. I explained to the patient that therapeutic measures involve lifelong lifestyle modifications, including: - Sodium reduction to less than 2 g/day - Following the Dietary Approaches to Stop Hypertension (DASH) diet, which includes 3 servings of fruits and vegetables daily, whole grains, low sodium, and low-fat proteins - Weight loss to achieve a BMI under 30 kg/m^2 - Engaging in physical activity: 3 to 5 times per week of daily aerobic exercise for 30- to 50-minute sessions, as tolerated - Avoiding alcohol consumption.

## 2025-01-21 ENCOUNTER — OFFICE VISIT (OUTPATIENT)
Dept: OBGYN CLINIC | Facility: CLINIC | Age: 65
End: 2025-01-21
Payer: MEDICARE

## 2025-01-21 VITALS — WEIGHT: 165 LBS | BODY MASS INDEX: 38.18 KG/M2 | HEIGHT: 55 IN

## 2025-01-21 DIAGNOSIS — M65.321 TRIGGER FINGER OF ALL DIGITS OF RIGHT HAND: ICD-10-CM

## 2025-01-21 DIAGNOSIS — M65.331 TRIGGER MIDDLE FINGER OF RIGHT HAND: Primary | ICD-10-CM

## 2025-01-21 DIAGNOSIS — M65.311 TRIGGER FINGER OF ALL DIGITS OF RIGHT HAND: ICD-10-CM

## 2025-01-21 DIAGNOSIS — M65.331 TRIGGER FINGER OF ALL DIGITS OF RIGHT HAND: ICD-10-CM

## 2025-01-21 DIAGNOSIS — M65.341 TRIGGER FINGER OF ALL DIGITS OF RIGHT HAND: ICD-10-CM

## 2025-01-21 DIAGNOSIS — M65.351 TRIGGER FINGER OF ALL DIGITS OF RIGHT HAND: ICD-10-CM

## 2025-01-21 PROCEDURE — 20550 NJX 1 TENDON SHEATH/LIGAMENT: CPT | Performed by: SURGERY

## 2025-01-21 PROCEDURE — 99203 OFFICE O/P NEW LOW 30 MIN: CPT | Performed by: SURGERY

## 2025-01-21 RX ORDER — BETAMETHASONE SODIUM PHOSPHATE AND BETAMETHASONE ACETATE 3; 3 MG/ML; MG/ML
3 INJECTION, SUSPENSION INTRA-ARTICULAR; INTRALESIONAL; INTRAMUSCULAR; SOFT TISSUE
Status: COMPLETED | OUTPATIENT
Start: 2025-01-21 | End: 2025-01-21

## 2025-01-21 RX ORDER — LIDOCAINE HYDROCHLORIDE 10 MG/ML
2.5 INJECTION, SOLUTION INFILTRATION; PERINEURAL
Status: COMPLETED | OUTPATIENT
Start: 2025-01-21 | End: 2025-01-21

## 2025-01-21 RX ADMIN — BETAMETHASONE SODIUM PHOSPHATE AND BETAMETHASONE ACETATE 3 MG: 3; 3 INJECTION, SUSPENSION INTRA-ARTICULAR; INTRALESIONAL; INTRAMUSCULAR; SOFT TISSUE at 09:00

## 2025-01-21 RX ADMIN — LIDOCAINE HYDROCHLORIDE 2.5 ML: 10 INJECTION, SOLUTION INFILTRATION; PERINEURAL at 09:00

## 2025-01-21 NOTE — PROGRESS NOTES
ORTHOPAEDIC HAND, WRIST, AND ELBOW OFFICE  VISIT       ASSESSMENT/PLAN:      64 y.o. year old female who presents with Right Middle finger trigger finger    Physical exam was performed and the findings are consistent with trigger finger  Injections were offered for trigger fingers and we discussed they are 75-80% effective at resolving symptoms. They may have a repeat injection in 3 months if they get good relief of symptoms or full resolution that returns   Pt was offered, accepted, performed and steroid injection Middle finger A1 pulleys for symptomatic relief. Pt tolerated injections well. Ice and post injection protocol advised. Weigh bearing activities as tolerated.  NSAID's as needed for pain  Activities as tolerated and increase as able.  No specific restrictions from our standpoint.  Discussed pt must wait 3 months from injection for surgery  She will monitor the flexor retinacular cyst on her ring finger    The patient verbalized understanding of exam findings and treatment plan. We engaged in the shared decision-making process and treatment options were discussed at length with the patient. Surgical and conservative management discussed today along with risks and benefits.    Diagnoses and all orders for this visit:    Trigger middle finger of right hand  -     Hand/upper extremity injection: R long A1    Trigger finger of all digits of right hand  -     Ambulatory Referral to Orthopedic Surgery        Follow Up:  Return if symptoms worsen or fail to improve.        ____________________________________________________________________________________________________________________________________________      CHIEF COMPLAINT:  Chief Complaint   Patient presents with    Right Hand - Locking, Pain     Middle trigger finger. Pain at the base of ring finger. Pain for about a year.        SUBJECTIVE:  Tova Faulkner is a 64 y.o. year old  female who presents for evaluation Right long finger    Patient states she  started to notice pain in her R middle finger about one years ago She states this is also the time she started playing video games more. Since that time her pain has gotten worse and she has some locking at times. Her chief complaint is not being able to flex her finger fully and pain.   She state she has been using a splint  She was offered an injection by her PCP but refused as she has kidney issues and was afraid this would affect her kidneys    She is type 2 diabetic    I have personally reviewed all the relevant PMH, PSH, SH, FH, Medications and allergies      PAST MEDICAL HISTORY:  Past Medical History:   Diagnosis Date    Back problem     herniated discs    Chronic pain disorder     back    Depression     Diabetes mellitus (HCC)     Diverticulitis     Diverticulosis     Gallstones     Hyperglycemia     Hyperlipidemia     Hypertension     Impacted cerumen     Obesity     Palpitations     Steatosis (HCC)        PAST SURGICAL HISTORY:  Past Surgical History:   Procedure Laterality Date    BREAST CYST EXCISION Left     CARPAL TUNNEL RELEASE Right 1983    DIAGNOSTIC LAPAROSCOPY      ESOPHAGOGASTRODUODENOSCOPY  10/2018    gastroduodenitis    MAMMO STEREOTACTIC BREAST BIOPSY LEFT (ALL INC) Left 11/08/2023    PERIPHERAL ANGIOGRAM      WA COLONOSCOPY FLX DX W/COLLJ SPEC WHEN PFRMD N/A 09/12/2018    dr chowdhury, diverticulosis    WA ESOPHAGOGASTRODUODENOSCOPY TRANSORAL DIAGNOSTIC N/A 10/10/2018    Procedure: EGD;  Surgeon: Greg Chowdhury DO;  Location:  GI LAB;  Service: General    UPPER GASTROINTESTINAL ENDOSCOPY  05/22/2024       FAMILY HISTORY:  Family History   Problem Relation Age of Onset    Kidney disease Mother     Hypertension Mother     Coronary artery disease Father         premature    Diabetes Sister     Coronary artery disease Sister     No Known Problems Daughter     No Known Problems Maternal Grandmother     Coronary artery disease Maternal Grandfather     Diabetes Paternal Grandfather     Coronary artery  disease Paternal Grandfather     Breast cancer Paternal Aunt 40       SOCIAL HISTORY:  Social History     Tobacco Use    Smoking status: Never    Smokeless tobacco: Never   Vaping Use    Vaping status: Never Used   Substance Use Topics    Alcohol use: No    Drug use: No       MEDICATIONS:    Current Outpatient Medications:     atorvastatin (LIPITOR) 40 mg tablet, Take 1 tablet (40 mg total) by mouth daily, Disp: 100 tablet, Rfl: 5    Comfort Touch Plus Lancets 30G MISC, USE TO TEST THE BLOOD SUGAR EVERY MORNING, Disp: 100 each, Rfl: 1    ergocalciferol (VITAMIN D2) 50,000 units, Take 1 capsule (50,000 Units total) by mouth once a week, Disp: 12 capsule, Rfl: 7    ezetimibe (ZETIA) 10 mg tablet, TAKE ONE TABLET BY MOUTH ONCE DAILY, Disp: 30 tablet, Rfl: 4    glucose blood (FREESTYLE LITE) test strip, USE TO TEST THE BLOOD SUGAR THREE TIMES A DAY, Disp: 300 strip, Rfl: 1    losartan (COZAAR) 50 mg tablet, Take 1 tablet (50 mg total) by mouth 2 (two) times a day, Disp: 200 tablet, Rfl: 3    metFORMIN (GLUCOPHAGE) 500 mg tablet, Take 1 tablet (500 mg total) by mouth 2 (two) times a day with meals, Disp: 180 tablet, Rfl: 1    NIFEdipine (PROCARDIA XL) 30 mg 24 hr tablet, Take 1 tablet (30 mg total) by mouth daily at bedtime, Disp: 100 tablet, Rfl: 3    traZODone (DESYREL) 50 mg tablet, , Disp: , Rfl:     UltiCare Alcohol Swabs 70 % PADS, USE TO CLEAN THE AREAS BEFORE TESTING BLOOD SUGAR OR/AND INJECTING INSULIN THREE TIMES A DAY AS DIRECTED, Disp: 100 each, Rfl: 4    Canagliflozin-metFORMIN HCl  MG TABS, , Disp: , Rfl:     semaglutide, 2 mg/dose, (Ozempic, 2 MG/DOSE,) 8 mg/ mL injection pen, Inject 0.75 mL (2 mg total) under the skin every 7 days (Patient not taking: Reported on 12/16/2024), Disp: 3 mL, Rfl: 5    ALLERGIES:  Allergies   Allergen Reactions    Farxiga [Dapagliflozin] Angioedema    Vicodin [Hydrocodone-Acetaminophen] Angioedema    Percocet [Oxycodone-Acetaminophen] GI Intolerance    Gabapentin GI  Intolerance     vomiting    Lyrica [Pregabalin] GI Intolerance     Stomach ache.    Aspirin GI Intolerance    Diclofenac Sodium Rash           REVIEW OF SYSTEMS:  Review of Systems   Constitutional:  Negative for chills and fever.   HENT:  Negative for ear pain and sore throat.    Eyes:  Negative for pain and visual disturbance.   Respiratory:  Negative for cough and shortness of breath.    Cardiovascular:  Negative for chest pain and palpitations.   Gastrointestinal:  Negative for abdominal pain and vomiting.   Genitourinary:  Negative for dysuria and hematuria.   Musculoskeletal:  Negative for arthralgias and back pain.   Skin:  Negative for color change and rash.   Neurological:  Negative for seizures and syncope.   All other systems reviewed and are negative.      VITALS:  There were no vitals filed for this visit.    LABS:  HgA1c:   Lab Results   Component Value Date    HGBA1C 6.4 09/10/2024     BMP:   Lab Results   Component Value Date    CALCIUM 9.9 12/21/2024    K 3.7 12/21/2024    CO2 25 12/21/2024     12/21/2024    BUN 12 12/21/2024    CREATININE 0.71 12/21/2024       _____________________________________________________  PHYSICAL EXAMINATION:  General: well developed and well nourished, alert, oriented times 3, and appears comfortable  Psychiatric: Normal  HEENT: Normocephalic, Atraumatic Trachea Midline, No torticollis  Pulmonary: No audible wheezing or respiratory distress   Abdomen/GI: Non tender, non distended   Cardiovascular: No pitting edema, 2+ radial pulse   Skin: No masses, erythema, lacerations, fluctation, ulcerations  Neurovascular: Sensation Intact to the Median, Ulnar, Radial Nerve, Motor Intact to the Median, Ulnar, Radial Nerve, and Pulses Intact  Musculoskeletal: Normal, except as noted in detailed exam and in HPI.      MUSCULOSKELETAL EXAMINATION:  Right hand:  SILT  Composite fist    Middle finger:       Positive tenderness to palpation over A1 pulley.   Cyst noted on Ring finger  A1 pulley    ___________________________________________________  STUDIES REVIEWED:    No new images obtained/reviewed      PROCEDURES PERFORMED:  Hand/upper extremity injection: R long A1  Universal Protocol:  procedure performed by consultantConsent: Verbal consent obtained.  Risks and benefits: risks, benefits and alternatives were discussed  Consent given by: patient  Timeout called at: 1/21/2025 8:50 AM.  Patient understanding: patient states understanding of the procedure being performed  Site marked: the operative site was marked  Patient identity confirmed: verbally with patient  Supporting Documentation  Indications: pain and tendon swelling   Procedure Details  Condition:trigger finger Location: long finger - R long A1   Needle size: 22 G  Ultrasound guidance: no  Approach: volar  Medications administered: 2.5 mL lidocaine 1 %; 3 mg betamethasone acetate-betamethasone sodium phosphate 6 (3-3) mg/mL  Patient tolerance: patient tolerated the procedure well with no immediate complications  Dressing:  Sterile dressing applied             _____________________________________________________      Scribe Attestation      I,:  Lefty Pedro am acting as a scribe while in the presence of the attending physician.:       I,:  Flynn Nair MD personally performed the services described in this documentation    as scribed in my presence.:

## 2025-01-31 ENCOUNTER — OFFICE VISIT (OUTPATIENT)
Dept: OBGYN CLINIC | Facility: CLINIC | Age: 65
End: 2025-01-31
Payer: MEDICARE

## 2025-01-31 DIAGNOSIS — M67.441 GANGLION CYST OF FLEXOR TENDON SHEATH OF FINGER OF RIGHT HAND: Primary | ICD-10-CM

## 2025-01-31 PROCEDURE — 99213 OFFICE O/P EST LOW 20 MIN: CPT | Performed by: ORTHOPAEDIC SURGERY

## 2025-01-31 NOTE — PROGRESS NOTES
The HAND & UPPER EXTREMITY OFFICE VISIT   Referred By:  No referring provider defined for this encounter.      Chief Complaint:     Ring finger mass    History of Present Illness:   64 y.o., female presents with a mass on the right ring finger. She reports the mass is not very painful or bothersome.   Previously seen by Dr. Nair on 1/21 and had a right middle trigger finger CSI. She reports the middle finger is doing better since the injection and she has improved ROM.     ADLs: Community ambulator  Smoke: denies ETOH: denies   Drugs:  denies        Past Medical History:  Past Medical History:   Diagnosis Date    Back problem     herniated discs    Chronic pain disorder     back    Depression     Diabetes mellitus (HCC)     Diverticulitis     Diverticulosis     Gallstones     Hyperglycemia     Hyperlipidemia     Hypertension     Impacted cerumen     Obesity     Palpitations     Steatosis (HCC)      Past Surgical History:   Procedure Laterality Date    BREAST CYST EXCISION Left     CARPAL TUNNEL RELEASE Right 1983    DIAGNOSTIC LAPAROSCOPY      ESOPHAGOGASTRODUODENOSCOPY  10/2018    gastroduodenitis    MAMMO STEREOTACTIC BREAST BIOPSY LEFT (ALL INC) Left 11/08/2023    PERIPHERAL ANGIOGRAM      AR COLONOSCOPY FLX DX W/COLLJ SPEC WHEN PFRMD N/A 09/12/2018    dr chowdhury, diverticulosis    AR ESOPHAGOGASTRODUODENOSCOPY TRANSORAL DIAGNOSTIC N/A 10/10/2018    Procedure: EGD;  Surgeon: Greg Chowdhury DO;  Location:  GI LAB;  Service: General    UPPER GASTROINTESTINAL ENDOSCOPY  05/22/2024     Family History   Problem Relation Age of Onset    Kidney disease Mother     Hypertension Mother     Coronary artery disease Father         premature    Diabetes Sister     Coronary artery disease Sister     No Known Problems Daughter     No Known Problems Maternal Grandmother     Coronary artery disease Maternal Grandfather     Diabetes Paternal Grandfather     Coronary artery disease Paternal Grandfather     Breast cancer Paternal Aunt  40     Social History     Socioeconomic History    Marital status:      Spouse name: Not on file    Number of children: Not on file    Years of education: Not on file    Highest education level: Not on file   Occupational History    Not on file   Tobacco Use    Smoking status: Never    Smokeless tobacco: Never   Vaping Use    Vaping status: Never Used   Substance and Sexual Activity    Alcohol use: No    Drug use: No    Sexual activity: Not Currently     Partners: Male   Other Topics Concern    Not on file   Social History Narrative    Not on file     Social Drivers of Health     Financial Resource Strain: Low Risk  (2/21/2024)    Overall Financial Resource Strain (CARDIA)     Difficulty of Paying Living Expenses: Not hard at all   Food Insecurity: Not on file   Transportation Needs: No Transportation Needs (2/21/2024)    PRAPARE - Transportation     Lack of Transportation (Medical): No     Lack of Transportation (Non-Medical): No   Physical Activity: Not on file   Stress: Not on file   Social Connections: Not on file   Intimate Partner Violence: Not on file   Housing Stability: Not on file     Scheduled Meds:  Continuous Infusions:No current facility-administered medications for this visit.    PRN Meds:.  Allergies   Allergen Reactions    Farxiga [Dapagliflozin] Angioedema    Vicodin [Hydrocodone-Acetaminophen] Angioedema    Percocet [Oxycodone-Acetaminophen] GI Intolerance    Gabapentin GI Intolerance     vomiting    Lyrica [Pregabalin] GI Intolerance     Stomach ache.    Aspirin GI Intolerance    Diclofenac Sodium Rash           Physical Examination:    There were no vitals taken for this visit.    Gen: A&Ox3, NAD  Cardiac: regular rate  Chest: non labored breathing  Abdomen: Non-distended    Right Upper Extremity:  Skin CDI  No obvious deformity of the shoulder, arm, elbow, forearm, wrist, hand  Palpable retinacular cyst over ring finger flexor tendon. Non-tender, no active triggering  Sensation intact  to light touch in the axillary median, ulnar, and radial nerve distributions  Able to form loose composite fist  Warm, well-perfused digits  Cap refill <2s      Studies:  No imaging to review      Assessment and Plan:  1. Ganglion cyst of flexor tendon sheath of finger of right hand            64 y.o. female presents with signs and symptoms consistent with the above diagnosis.  We discussed the natural history of this condition and its pathogenesis.  We discussed operative and nonoperative treatment options. She reports the area is not very painful or bothersome. We discussed treatment options including observation, aspiration, or surgical excision. She would like to continue with observation for now and if the area becomes larger or more bothersome, we can consider additional treatment options as needed. It is recommended she return to the office as needed if the problem fails to improve, worsens, or recurs.    she expressed understanding of the plan and agreed. We encouraged them to contact our office with any questions or concerns.         Brendan Villagran MD  Hand and Upper Extremity Surgery        *This note was dictated using Dragon voice recognition software. Please excuse any word substitutions or errors.*      Scribe Attestation      I,:  Sigrid Nash PA-C am acting as a scribe while in the presence of the attending physician.:       I,:  Brendan Villagran MD personally performed the services described in this documentation    as scribed in my presence.:

## 2025-02-07 DIAGNOSIS — E11.9 TYPE 2 DIABETES MELLITUS WITHOUT COMPLICATION, UNSPECIFIED WHETHER LONG TERM INSULIN USE (HCC): ICD-10-CM

## 2025-02-08 RX ORDER — LANCETS 30 GAUGE
EACH MISCELLANEOUS
Qty: 100 EACH | Refills: 0 | Status: SHIPPED | OUTPATIENT
Start: 2025-02-08

## 2025-02-12 NOTE — PROGRESS NOTES
Pulmonary Follow Up Note   Tova Faulkner 64 y.o. female MRN: [id number]  2/14/2025    Assessment:  Chronic cough/episodes of dyspnea  Feeling better over the past few months  Suspect underlying PND/allergic bronchospasm  STERLING is also improving, suspect deconditioning/obesity related  Workup so far: CT PE with clear lung fields, no pulmonary embolism, TTE was unremarkable  PFT without obstruction/restriction, no significant BD response    Plan:  Counseled about avoiding allergens  Continue albuterol as PRN, if noted frequent use will need a maintenance inhaler  Continue nasal sprays/humidification    Return if symptoms worsen or fail to improve.    History of Present Illness     Follow up for: chronic cough/dyspnea        Background  64 y.o. female with a h/o DM2, class IV obesity, lumbar radiculopathy, dyslipidemia, HTN, gastroparesis, CKD 1, depression.     Reports ongoing symptoms for approximately 2 years.  Episodic cough, frequent every day mostly dry, sometimes associated with white sputum.  Associated with exertion, not all the times, also noted orthopnea.  States that she underwent cardiac workup/sounds like a cardiac cath in [place] and was told it was normal.  Symptoms usually triggered by deep breathing, not triggered by environmental allergens, no PND or rhinorrhea.  Most recently seen by PCP,  noted elevated D-dimer for which a CT PE showed no pulmonary embolism, clear lung fields.     Reports no prior Hx of asthma, or COPD, lifelong non-smoker.  Had a +FH asthma in mother.  She is a lifelong non-smoker, nonalcoholic.  No prior significant exposure to hazards environmental/or acute patient.     Baseline, active/independent in ADLs, able to walk on a surface level long distance,  climb a flight of stair is a little difficult.     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  However, noted symptomatic relief with PRN albuterol, over the past few weeks did not have to use frequently.  Reports cough is less frequent, usually more when having allergy-like symptoms using vaporizer/humidifier to dissolve the mucus.  Otherwise active/independent without new respiratory symptoms      Review of Systems  As per hpi, all other systems reviewed and were negative       Studies:  Imaging and other studies: I have personally reviewed pertinent films in PACS  CT PE 11/12/2024-clear lung fields, no endobronchial lesions.  No pulmonary embolism.     Pulmonary function testing:   PFT 12/6/2024-ratio 91%, FEV1 1.35 L / 100%, FVC 1.49 L / 90%, %, %, unreliable DLCO, no significant BD response    EKG, Pathology, and Other Studies: I have personally reviewed pertinent reports.       Resting TTE 12/12/2024-EF 65%, wall thickness mildly increased, concentric remodeling.  Normal RV size and function.  RVSP 27 mmHg.    Past medical, surgical, social and family histories reviewed.      Medications/Allergies: Reviewed      Vitals: Blood pressure 130/80, pulse 88, temperature 97.6 °F (36.4 °C), temperature source Tympanic, height 4' 3\" (1.295 m), weight 74.5 kg (164 lb 3.2 oz), SpO2 98%, not currently breastfeeding. Body mass index is 44.38 kg/m². Oxygen Therapy  SpO2: 98 %  Oxygen Therapy: None (Room air)      Physical Exam  Body mass index is 44.38 kg/m².   Gen: not in acute distress, obese  Neck/Eyes: supple, PERRL  Ear: normal appearance, no significant hearing impairment  Nose:  normal nasal mucosa, no drainage  Mouth:  unremarkable/normal appearance of lips, teeth and gums  Oropharynx: mucosa is moist, no focal " "lesions or erythema Mallampati type IV  Salivary glands: soft nontender  Chest: normal respiratory efforts, clear breath sounds bilaterally  CV: RRR, no murmurs appreciated, no edema  Abdomen: soft, non tender  Extremities:  No observed deformity   Skin: unremarkable  Neuro: AAO X3, no focal motor deficit        Labs:  Lab Results   Component Value Date    WBC 9.62 11/11/2024    HGB 11.4 (L) 11/11/2024    HCT 35.7 11/11/2024    MCV 95 11/11/2024     11/11/2024     Lab Results   Component Value Date    CALCIUM 9.9 12/21/2024    K 3.7 12/21/2024    CO2 25 12/21/2024     12/21/2024    BUN 12 12/21/2024    CREATININE 0.71 12/21/2024     No results found for: \"IGE\"  Lab Results   Component Value Date    ALT 17 09/10/2024    AST 15 09/10/2024    ALKPHOS 67 09/10/2024           Portions of the record may have been created with voice recognition software.  Occasional wrong word or \"sound a like\" substitutions may have occurred due to the inherent limitations of voice recognition software.  Read the chart carefully and recognize, using context, where substitutions have occurred    Lisa Martinez M.D.  [facility]  "

## 2025-02-14 ENCOUNTER — OFFICE VISIT (OUTPATIENT)
Dept: PULMONOLOGY | Facility: CLINIC | Age: 65
End: 2025-02-14
Payer: MEDICARE

## 2025-02-14 VITALS
OXYGEN SATURATION: 98 % | TEMPERATURE: 97.6 F | SYSTOLIC BLOOD PRESSURE: 130 MMHG | HEIGHT: 55 IN | BODY MASS INDEX: 38 KG/M2 | WEIGHT: 164.2 LBS | DIASTOLIC BLOOD PRESSURE: 80 MMHG | HEART RATE: 88 BPM

## 2025-02-14 DIAGNOSIS — R05.3 CHRONIC COUGH: Primary | ICD-10-CM

## 2025-02-14 PROBLEM — G47.33 OSA (OBSTRUCTIVE SLEEP APNEA): Status: RESOLVED | Noted: 2023-11-17 | Resolved: 2025-02-14

## 2025-02-14 PROCEDURE — 99213 OFFICE O/P EST LOW 20 MIN: CPT | Performed by: INTERNAL MEDICINE

## 2025-02-19 DIAGNOSIS — E11.9 TYPE 2 DIABETES MELLITUS WITHOUT COMPLICATION, UNSPECIFIED WHETHER LONG TERM INSULIN USE (HCC): ICD-10-CM

## 2025-02-20 RX ORDER — LANCETS 30 GAUGE
EACH MISCELLANEOUS
Refills: 0 | OUTPATIENT
Start: 2025-02-20

## 2025-02-24 RX ORDER — ALBUTEROL SULFATE 90 UG/1
INHALANT RESPIRATORY (INHALATION)
COMMUNITY
Start: 2025-01-30

## 2025-02-25 ENCOUNTER — OFFICE VISIT (OUTPATIENT)
Dept: FAMILY MEDICINE CLINIC | Facility: CLINIC | Age: 65
End: 2025-02-25
Payer: MEDICARE

## 2025-02-25 VITALS
RESPIRATION RATE: 16 BRPM | SYSTOLIC BLOOD PRESSURE: 138 MMHG | TEMPERATURE: 97.9 F | OXYGEN SATURATION: 98 % | HEART RATE: 88 BPM | BODY MASS INDEX: 37.95 KG/M2 | DIASTOLIC BLOOD PRESSURE: 80 MMHG | HEIGHT: 55 IN | WEIGHT: 164 LBS

## 2025-02-25 DIAGNOSIS — R80.9 TYPE 2 DIABETES MELLITUS WITH MICROALBUMINURIA, WITHOUT LONG-TERM CURRENT USE OF INSULIN (HCC): ICD-10-CM

## 2025-02-25 DIAGNOSIS — E11.29 TYPE 2 DIABETES MELLITUS WITH MICROALBUMINURIA, WITHOUT LONG-TERM CURRENT USE OF INSULIN (HCC): ICD-10-CM

## 2025-02-25 DIAGNOSIS — Z00.00 MEDICARE ANNUAL WELLNESS VISIT, SUBSEQUENT: Primary | ICD-10-CM

## 2025-02-25 LAB — SL AMB POCT HEMOGLOBIN AIC: 6.1 (ref ?–6.5)

## 2025-02-25 PROCEDURE — G0439 PPPS, SUBSEQ VISIT: HCPCS | Performed by: FAMILY MEDICINE

## 2025-02-25 PROCEDURE — G2211 COMPLEX E/M VISIT ADD ON: HCPCS | Performed by: FAMILY MEDICINE

## 2025-02-25 PROCEDURE — 99213 OFFICE O/P EST LOW 20 MIN: CPT | Performed by: FAMILY MEDICINE

## 2025-02-25 PROCEDURE — 83036 HEMOGLOBIN GLYCOSYLATED A1C: CPT | Performed by: FAMILY MEDICINE

## 2025-02-25 NOTE — PATIENT INSTRUCTIONS
Medicare Preventive Visit Patient Instructions  Thank you for completing your Welcome to Medicare Visit or Medicare Annual Wellness Visit today. Your next wellness visit will be due in one year (2/26/2026).  The screening/preventive services that you may require over the next 5-10 years are detailed below. Some tests may not apply to you based off risk factors and/or age. Screening tests ordered at today's visit but not completed yet may show as past due. Also, please note that scanned in results may not display below.  Preventive Screenings:  Service Recommendations Previous Testing/Comments   Colorectal Cancer Screening  * Colonoscopy    * Fecal Occult Blood Test (FOBT)/Fecal Immunochemical Test (FIT)  * Fecal DNA/Cologuard Test  * Flexible Sigmoidoscopy Age: 45-75 years old   Colonoscopy: every 10 years (may be performed more frequently if at higher risk)  OR  FOBT/FIT: every 1 year  OR  Cologuard: every 3 years  OR  Sigmoidoscopy: every 5 years  Screening may be recommended earlier than age 45 if at higher risk for colorectal cancer. Also, an individualized decision between you and your healthcare provider will decide whether screening between the ages of 76-85 would be appropriate. Colonoscopy: 09/12/2018  FOBT/FIT: Not on file  Cologuard: Not on file  Sigmoidoscopy: Not on file    Screening Current     Breast Cancer Screening Age: 40+ years old  Frequency: every 1-2 years  Not required if history of left and right mastectomy Mammogram: 09/27/2024    Screening Current   Cervical Cancer Screening Between the ages of 21-29, pap smear recommended once every 3 years.   Between the ages of 30-65, can perform pap smear with HPV co-testing every 5 years.   Recommendations may differ for women with a history of total hysterectomy, cervical cancer, or abnormal pap smears in past. Pap Smear: Not on file    Screening Not Indicated   Hepatitis C Screening Once for adults born between 1945 and 1965  More frequently in  patients at high risk for Hepatitis C Hep C Antibody: 08/17/2018    Screening Current   Diabetes Screening 1-2 times per year if you're at risk for diabetes or have pre-diabetes Fasting glucose: 106 mg/dL (12/21/2024)  A1C: 6.4 (9/10/2024)  Screening Not Indicated  History Diabetes   Cholesterol Screening Once every 5 years if you don't have a lipid disorder. May order more often based on risk factors. Lipid panel: 03/08/2024    Screening Not Indicated  History Lipid Disorder     Other Preventive Screenings Covered by Medicare:  Abdominal Aortic Aneurysm (AAA) Screening: covered once if your at risk. You're considered to be at risk if you have a family history of AAA.  Lung Cancer Screening: covers low dose CT scan once per year if you meet all of the following conditions: (1) Age 55-77; (2) No signs or symptoms of lung cancer; (3) Current smoker or have quit smoking within the last 15 years; (4) You have a tobacco smoking history of at least 20 pack years (packs per day multiplied by number of years you smoked); (5) You get a written order from a healthcare provider.  Glaucoma Screening: covered annually if you're considered high risk: (1) You have diabetes OR (2) Family history of glaucoma OR (3)  aged 50 and older OR (4)  American aged 65 and older  Osteoporosis Screening: covered every 2 years if you meet one of the following conditions: (1) You're estrogen deficient and at risk for osteoporosis based off medical history and other findings; (2) Have a vertebral abnormality; (3) On glucocorticoid therapy for more than 3 months; (4) Have primary hyperparathyroidism; (5) On osteoporosis medications and need to assess response to drug therapy.   Last bone density test (DXA Scan): 09/27/2024.  HIV Screening: covered annually if you're between the age of 15-65. Also covered annually if you are younger than 15 and older than 65 with risk factors for HIV infection. For pregnant patients, it is  covered up to 3 times per pregnancy.    Immunizations:  Immunization Recommendations   Influenza Vaccine Annual influenza vaccination during flu season is recommended for all persons aged >= 6 months who do not have contraindications   Pneumococcal Vaccine   * Pneumococcal conjugate vaccine = PCV13 (Prevnar 13), PCV15 (Vaxneuvance), PCV20 (Prevnar 20)  * Pneumococcal polysaccharide vaccine = PPSV23 (Pneumovax) Adults 19-65 yo with certain risk factors or if 65+ yo  If never received any pneumonia vaccine: recommend Prevnar 20 (PCV20)  Give PCV20 if previously received 1 dose of PCV13 or PPSV23   Hepatitis B Vaccine 3 dose series if at intermediate or high risk (ex: diabetes, end stage renal disease, liver disease)   Respiratory syncytial virus (RSV) Vaccine - COVERED BY MEDICARE PART D  * RSVPreF3 (Arexvy) CDC recommends that adults 60 years of age and older may receive a single dose of RSV vaccine using shared clinical decision-making (SCDM)   Tetanus (Td) Vaccine - COST NOT COVERED BY MEDICARE PART B Following completion of primary series, a booster dose should be given every 10 years to maintain immunity against tetanus. Td may also be given as tetanus wound prophylaxis.   Tdap Vaccine - COST NOT COVERED BY MEDICARE PART B Recommended at least once for all adults. For pregnant patients, recommended with each pregnancy.   Shingles Vaccine (Shingrix) - COST NOT COVERED BY MEDICARE PART B  2 shot series recommended in those 19 years and older who have or will have weakened immune systems or those 50 years and older     Health Maintenance Due:      Topic Date Due   • Breast Cancer Screening: Mammogram  09/27/2025   • DXA SCAN  09/27/2026   • Colorectal Cancer Screening  09/12/2028   • HIV Screening  Completed   • Hepatitis C Screening  Completed     Immunizations Due:      Topic Date Due   • COVID-19 Vaccine (5 - 2024-25 season) 09/01/2024     Advance Directives   What are advance directives?  Advance directives are  legal documents that state your wishes and plans for medical care. These plans are made ahead of time in case you lose your ability to make decisions for yourself. Advance directives can apply to any medical decision, such as the treatments you want, and if you want to donate organs.   What are the types of advance directives?  There are many types of advance directives, and each state has rules about how to use them. You may choose a combination of any of the following:  Living will:  This is a written record of the treatment you want. You can also choose which treatments you do not want, which to limit, and which to stop at a certain time. This includes surgery, medicine, IV fluid, and tube feedings.   Durable power of  for healthcare (DPAHC):  This is a written record that states who you want to make healthcare choices for you when you are unable to make them for yourself. This person, called a proxy, is usually a family member or a friend. You may choose more than 1 proxy.  Do not resuscitate (DNR) order:  A DNR order is used in case your heart stops beating or you stop breathing. It is a request not to have certain forms of treatment, such as CPR. A DNR order may be included in other types of advance directives.  Medical directive:  This covers the care that you want if you are in a coma, near death, or unable to make decisions for yourself. You can list the treatments you want for each condition. Treatment may include pain medicine, surgery, blood transfusions, dialysis, IV or tube feedings, and a ventilator (breathing machine).  Values history:  This document has questions about your views, beliefs, and how you feel and think about life. This information can help others choose the care that you would choose.  Why are advance directives important?  An advance directive helps you control your care. Although spoken wishes may be used, it is better to have your wishes written down. Spoken wishes can be  misunderstood, or not followed. Treatments may be given even if you do not want them. An advance directive may make it easier for your family to make difficult choices about your care.   Weight Management   Why it is important to manage your weight:  Being overweight increases your risk of health conditions such as heart disease, high blood pressure, type 2 diabetes, and certain types of cancer. It can also increase your risk for osteoarthritis, sleep apnea, and other respiratory problems. Aim for a slow, steady weight loss. Even a small amount of weight loss can lower your risk of health problems.  How to lose weight safely:  A safe and healthy way to lose weight is to eat fewer calories and get regular exercise. You can lose up about 1 pound a week by decreasing the number of calories you eat by 500 calories each day.   Healthy meal plan for weight management:  A healthy meal plan includes a variety of foods, contains fewer calories, and helps you stay healthy. A healthy meal plan includes the following:  Eat whole-grain foods more often.  A healthy meal plan should contain fiber. Fiber is the part of grains, fruits, and vegetables that is not broken down by your body. Whole-grain foods are healthy and provide extra fiber in your diet. Some examples of whole-grain foods are whole-wheat breads and pastas, oatmeal, brown rice, and bulgur.  Eat a variety of vegetables every day.  Include dark, leafy greens such as spinach, kale, saray greens, and mustard greens. Eat yellow and orange vegetables such as carrots, sweet potatoes, and winter squash.   Eat a variety of fruits every day.  Choose fresh or canned fruit (canned in its own juice or light syrup) instead of juice. Fruit juice has very little or no fiber.  Eat low-fat dairy foods.  Drink fat-free (skim) milk or 1% milk. Eat fat-free yogurt and low-fat cottage cheese. Try low-fat cheeses such as mozzarella and other reduced-fat cheeses.  Choose meat and other  protein foods that are low in fat.  Choose beans or other legumes such as split peas or lentils. Choose fish, skinless poultry (chicken or turkey), or lean cuts of red meat (beef or pork). Before you cook meat or poultry, cut off any visible fat.   Use less fat and oil.  Try baking foods instead of frying them. Add less fat, such as margarine, sour cream, regular salad dressing and mayonnaise to foods. Eat fewer high-fat foods. Some examples of high-fat foods include french fries, doughnuts, ice cream, and cakes.  Eat fewer sweets.  Limit foods and drinks that are high in sugar. This includes candy, cookies, regular soda, and sweetened drinks.  Exercise:  Exercise at least 30 minutes per day on most days of the week. Some examples of exercise include walking, biking, dancing, and swimming. You can also fit in more physical activity by taking the stairs instead of the elevator or parking farther away from stores. Ask your healthcare provider about the best exercise plan for you.      © Copyright [employer] 2018 Information is for End User's use only and may not be sold, redistributed or otherwise used for commercial purposes. All illustrations and images included in CareNotes® are the copyrighted property of [employer] or [employer]

## 2025-02-25 NOTE — PROGRESS NOTES
Name: Tova Faulkner      : 1960      MRN: [id number]  Encounter Provider: Benjamín Shin MD  Encounter Date: 2025   Encounter department: [facility] PRIMARY CARE [facility]    Assessment & Plan  Medicare annual wellness visit, subsequent  Diabetes Follow Up Visit   Care Transition Planning  - Patient relocating to [place], [place] on   - Will prepare 90-day medication supplies for patient  - Will provide medical records summary  - Discussed medication access through [employer] in [place]  - Patient actively searching for new healthcare provider in [place]  Orders:    CBC and differential; Future    Comprehensive metabolic panel; Future    Lipid Panel with Direct LDL reflex; Future    Type 2 diabetes mellitus with microalbuminuria, without long-term current use of insulin ([facility])    Lab Results   Component Value Date    HGBA1C 6.1 2025   Assessment and Plan:   Diabetes Mellitus Type 2 - Improved Control  - HbA1c improved from 6.4% to 6.1%  - Continue current diabetes management  - Will arrange 90-day medication refills prior to patient's relocation  - Patient requests new glucose meter due to current device malfunction    Orders:    POCT hemoglobin A1c    Blood Glucose Monitoring Suppl KIT; Use 2 (two) times a day       Preventive health issues were discussed with patient, and age appropriate screening tests were ordered as noted in patient's After Visit Summary. Personalized health advice and appropriate referrals for health education or preventive services given if needed, as noted in patient's After Visit Summary.    History of Present Illness         Subjective:  64-year-old female presents for diabetes follow-up. Patient reports chronic back pain continues but otherwise stable. She is planning to relocate permanently to [place], [place] on . She has concerns about medication management during transition. Currently has approximately one refill remaining  of Ozempic due for refill on March 10. Denies chest pain or new symptoms. Reports some costal wall discomfort. Patient mentions issues with current glucose meter despite battery replacement. She is in process of establishing care with new providers in [place].    Objective:  Vital Signs: Not recorded during this visit    Laboratory Results:  - HbA1c: 6.1% (improved from previous 6.4%)    Physical Examination:  - Musculoskeletal: Reports costal wall tenderness  - Extremities: Notes some peripheral edema           Patient Care Team:  Benjamín Shin MD as PCP - General (Family Medicine)  Greg Jane DO as Endoscopist  RITA Garvey as Nurse Practitioner (Nephrology)  Adilia Mireles MD (Nephrology)  Alberto Meade MD as Fellow (Gastroenterology)  Abdirizak Daniel MD (Gastroenterology)    Review of Systems  Medical History Reviewed by provider this encounter:       Annual Wellness Visit Questionnaire   Tova is here for her Subsequent Wellness visit. Last Medicare Wellness visit information reviewed, patient interviewed and updates made to the record today.      Health Risk Assessment:   Patient rates overall health as good. Patient feels that their physical health rating is same. Patient is satisfied with their life. Eyesight was rated as same. Hearing was rated as same. Patient feels that their emotional and mental health rating is same. Patients states they are never, rarely angry. Patient states they are sometimes unusually tired/fatigued. Pain experienced in the last 7 days has been some. Patient's pain rating has been 3/10. Patient states that she has experienced no weight loss or gain in last 6 months.     Fall Risk Screening:   In the past year, patient has experienced: no history of falling in past year      Urinary Incontinence Screening:   Patient has not leaked urine accidently in the last six months.     Home Safety:  Patient has trouble with stairs inside or outside of their home. Patient has  working smoke alarms and has working carbon monoxide detector. Home safety hazards include: none.     Nutrition:   Current diet is Diabetic.     Medications:   Patient is currently taking over-the-counter supplements. OTC medications include: see medication list. Patient is able to manage medications.     Activities of Daily Living (ADLs)/Instrumental Activities of Daily Living (IADLs):   Walk and transfer into and out of bed and chair?: Yes  Dress and groom yourself?: Yes    Bathe or shower yourself?: Yes    Feed yourself? Yes  Do your laundry/housekeeping?: Yes  Manage your money, pay your bills and track your expenses?: Yes  Make your own meals?: Yes    Do your own shopping?: Yes    Previous Hospitalizations:   Any hospitalizations or ED visits within the last 12 months?: Yes    How many hospitalizations have you had in the last year?: 1-2    Advance Care Planning:   Living will: No    Durable POA for healthcare: No    Advanced directive: No    Advanced directive counseling given: Yes    Five wishes given: No    Patient declined ACP directive: No    End of Life Decisions reviewed with patient: Yes    Provider agrees with end of life decisions: Yes      Cognitive Screening:   Provider or family/friend/caregiver concerned regarding cognition?: No    PREVENTIVE SCREENINGS      Cardiovascular Screening:    General: Screening Not Indicated and History Lipid Disorder    Due for: Lipid Panel      Diabetes Screening:     General: Screening Not Indicated and History Diabetes    Due for: Blood Glucose      Colorectal Cancer Screening:     General: Screening Current    Due for: FOBT/FIT      Breast Cancer Screening:     General: Screening Current      Cervical Cancer Screening:    General: Screening Not Indicated      Osteoporosis Screening:    General: Screening Current    Due for: DXA Axial      Abdominal Aortic Aneurysm (AAA) Screening:        General: Screening Not Indicated      Lung Cancer Screening:     General:  "Screening Not Indicated      Hepatitis C Screening:    General: Screening Current    Hep C Screening Accepted: Yes      Screening, Brief Intervention, and Referral to Treatment (SBIRT)     Screening  Typical number of drinks in a day: 0  Typical number of drinks in a week: 0  Interpretation: Low risk drinking behavior.    Single Item Drug Screening:  How often have you used an illegal drug (including marijuana) or a prescription medication for non-medical reasons in the past year? never    Single Item Drug Screen Score: 0  Interpretation: Negative screen for possible drug use disorder    Other Counseling Topics:   Alcohol use counseling, car/seat belt/driving safety, skin self-exam, sunscreen and calcium and vitamin D intake and regular weightbearing exercise.     Social Drivers of Health     Financial Resource Strain: Low Risk  (2/21/2024)    Overall Financial Resource Strain (CARDIA)     Difficulty of Paying Living Expenses: Not hard at all   Food Insecurity: No Food Insecurity (2/25/2025)    Hunger Vital Sign     Worried About Running Out of Food in the Last Year: Never true     Ran Out of Food in the Last Year: Never true   Transportation Needs: No Transportation Needs (2/25/2025)    PRAPARE - Transportation     Lack of Transportation (Medical): No     Lack of Transportation (Non-Medical): No   Housing Stability: Low Risk  (2/25/2025)    Housing Stability Vital Sign     Unable to Pay for Housing in the Last Year: No     Number of Times Moved in the Last Year: 1     Homeless in the Last Year: No   Utilities: Not At Risk (2/25/2025)    [facility] Utilities     Threatened with loss of utilities: No     No results found.    Objective   /80 (BP Location: Left arm, Patient Position: Sitting, Cuff Size: Standard) Comment: no meds  Pulse 88   Temp 97.9 °F (36.6 °C) (Tympanic)   Resp 16   Ht 4' 3\" (1.295 m)   Wt 74.4 kg (164 lb)   SpO2 98%   BMI 44.33 kg/m²     Physical Exam  The patient appears to without " distress. Body mass index is 44.33 kg/m².  ; HEENT is unremarkable, the neck has no masses or enlarged lymph nodes. Respiratory effort is normal. Lung fields are clear; the heart has a normal rhythm without murmurs. Abdomen soft without abnormalities;  Neurological with no focal deficits.    Administrative Statements   I have spent a total time of 35 minutes in caring for this patient on the day of the visit/encounter including Risks and benefits of tx options, Instructions for management, Patient and family education, Importance of tx compliance, Risk factor reductions, Impressions, Counseling / Coordination of care, and Documenting in the medical record.

## 2025-02-25 NOTE — ASSESSMENT & PLAN NOTE
Lab Results   Component Value Date    HGBA1C 6.1 02/25/2025   Assessment and Plan:   Diabetes Mellitus Type 2 - Improved Control  - HbA1c improved from 6.4% to 6.1%  - Continue current diabetes management  - Will arrange 90-day medication refills prior to patient's relocation  - Patient requests new glucose meter due to current device malfunction    Orders:    POCT hemoglobin A1c    Blood Glucose Monitoring Suppl KIT; Use 2 (two) times a day

## 2025-02-28 ENCOUNTER — TELEPHONE (OUTPATIENT)
Dept: NEPHROLOGY | Facility: CLINIC | Age: 65
End: 2025-02-28

## 2025-03-11 ENCOUNTER — TELEPHONE (OUTPATIENT)
Dept: FAMILY MEDICINE CLINIC | Facility: CLINIC | Age: 65
End: 2025-03-11

## 2025-03-12 ENCOUNTER — NURSE TRIAGE (OUTPATIENT)
Age: 65
End: 2025-03-12

## 2025-03-12 NOTE — TELEPHONE ENCOUNTER
"FOLLOW UP: would like to know if she can have labs ordered today prior to tomorrow follow up appt.     REASON FOR CONVERSATION: Back Pain and darker urine    SYMPTOMS: darker colored urine, intermittent 5-6/10 right sided back pain, and increased form in urine    OTHER: aware to seek immediate medical attention if symptoms worsens.     DISPOSITION: See Today in Office  currently is scheduled for an appt tomorrow.         Reason for Disposition   Side (flank) or lower back pain present    Answer Assessment - Initial Assessment Questions  1. SYMPTOM: \"What's the main symptom you're concerned about?\" (e.g., frequency, incontinence)      Darker urine, more foam noted in urine and right sided back pain   2. ONSET: \"When did the   start?\"      About a week ago   3. PAIN: \"Is there any pain?\" If Yes, ask: \"How bad is it?\" (Scale: 1-10; mild, moderate, severe)      Intermittent 5-6   4. CAUSE: \"What do you think is causing the symptoms?\"      Unsure,   5. OTHER SYMPTOMS: \"Do you have any other symptoms?\" (e.g., blood in urine, fever, flank pain, pain with urination)      Right back pain    Protocols used: Urinary Symptoms-Adult-[place]    "

## 2025-03-13 ENCOUNTER — TELEPHONE (OUTPATIENT)
Age: 65
End: 2025-03-13

## 2025-03-13 DIAGNOSIS — N18.1 CKD (CHRONIC KIDNEY DISEASE) STAGE 1, GFR 90 ML/MIN OR GREATER: Primary | ICD-10-CM

## 2025-03-13 DIAGNOSIS — R80.1 PERSISTENT PROTEINURIA: ICD-10-CM

## 2025-03-13 NOTE — TELEPHONE ENCOUNTER
"Phone call from patient to reschedule appt from today \"patient hurt back this morning\". Due to technical difficulties unable to reschedule patient. Please contact patient with new appt. In addition patient would like order for labs.   "

## 2025-03-14 ENCOUNTER — RESULTS FOLLOW-UP (OUTPATIENT)
Dept: FAMILY MEDICINE CLINIC | Facility: CLINIC | Age: 65
End: 2025-03-14

## 2025-03-14 ENCOUNTER — APPOINTMENT (OUTPATIENT)
Dept: LAB | Facility: HOSPITAL | Age: 65
End: 2025-03-14
Payer: MEDICARE

## 2025-03-14 DIAGNOSIS — R80.1 PERSISTENT PROTEINURIA: ICD-10-CM

## 2025-03-14 DIAGNOSIS — N18.1 CKD (CHRONIC KIDNEY DISEASE) STAGE 1, GFR 90 ML/MIN OR GREATER: ICD-10-CM

## 2025-03-14 DIAGNOSIS — Z00.00 MEDICARE ANNUAL WELLNESS VISIT, SUBSEQUENT: ICD-10-CM

## 2025-03-14 LAB
ALBUMIN SERPL BCG-MCNC: 4.5 G/DL (ref 3.5–5)
ALP SERPL-CCNC: 78 U/L (ref 34–104)
ALT SERPL W P-5'-P-CCNC: 7 U/L (ref 7–52)
ANION GAP SERPL CALCULATED.3IONS-SCNC: 9 MMOL/L (ref 4–13)
AST SERPL W P-5'-P-CCNC: 9 U/L (ref 13–39)
BACTERIA UR QL AUTO: NORMAL /HPF
BASOPHILS # BLD AUTO: 0.05 THOUSANDS/ÂΜL (ref 0–0.1)
BASOPHILS NFR BLD AUTO: 1 % (ref 0–1)
BILIRUB SERPL-MCNC: 0.42 MG/DL (ref 0.2–1)
BILIRUB UR QL STRIP: NEGATIVE
BUN SERPL-MCNC: 16 MG/DL (ref 5–25)
CALCIUM SERPL-MCNC: 9.9 MG/DL (ref 8.4–10.2)
CHLORIDE SERPL-SCNC: 102 MMOL/L (ref 96–108)
CHOLEST SERPL-MCNC: 178 MG/DL (ref ?–200)
CLARITY UR: CLEAR
CO2 SERPL-SCNC: 27 MMOL/L (ref 21–32)
COLOR UR: YELLOW
CREAT SERPL-MCNC: 0.8 MG/DL (ref 0.6–1.3)
CREAT UR-MCNC: 100.9 MG/DL
EOSINOPHIL # BLD AUTO: 0.14 THOUSAND/ÂΜL (ref 0–0.61)
EOSINOPHIL NFR BLD AUTO: 2 % (ref 0–6)
ERYTHROCYTE [DISTWIDTH] IN BLOOD BY AUTOMATED COUNT: 13.6 % (ref 11.6–15.1)
GFR SERPL CREATININE-BSD FRML MDRD: 77 ML/MIN/1.73SQ M
GLUCOSE P FAST SERPL-MCNC: 105 MG/DL (ref 65–99)
GLUCOSE UR STRIP-MCNC: NEGATIVE MG/DL
HCT VFR BLD AUTO: 39 % (ref 34.8–46.1)
HDLC SERPL-MCNC: 59 MG/DL
HGB BLD-MCNC: 12 G/DL (ref 11.5–15.4)
HGB UR QL STRIP.AUTO: NEGATIVE
IMM GRANULOCYTES # BLD AUTO: 0.03 THOUSAND/UL (ref 0–0.2)
IMM GRANULOCYTES NFR BLD AUTO: 0 % (ref 0–2)
KETONES UR STRIP-MCNC: NEGATIVE MG/DL
LDLC SERPL CALC-MCNC: 105 MG/DL (ref 0–100)
LEUKOCYTE ESTERASE UR QL STRIP: 25
LYMPHOCYTES # BLD AUTO: 1.98 THOUSANDS/ÂΜL (ref 0.6–4.47)
LYMPHOCYTES NFR BLD AUTO: 22 % (ref 14–44)
MCH RBC QN AUTO: 28.9 PG (ref 26.8–34.3)
MCHC RBC AUTO-ENTMCNC: 30.8 G/DL (ref 31.4–37.4)
MCV RBC AUTO: 94 FL (ref 82–98)
MICROALBUMIN UR-MCNC: 30.7 MG/L
MICROALBUMIN/CREAT 24H UR: 30 MG/G CREATININE (ref 0–30)
MONOCYTES # BLD AUTO: 0.61 THOUSAND/ÂΜL (ref 0.17–1.22)
MONOCYTES NFR BLD AUTO: 7 % (ref 4–12)
NEUTROPHILS # BLD AUTO: 6.31 THOUSANDS/ÂΜL (ref 1.85–7.62)
NEUTS SEG NFR BLD AUTO: 68 % (ref 43–75)
NITRITE UR QL STRIP: NEGATIVE
NON-SQ EPI CELLS URNS QL MICRO: NORMAL /HPF
NRBC BLD AUTO-RTO: 0 /100 WBCS
PH UR STRIP.AUTO: 6 [PH]
PLATELET # BLD AUTO: 334 THOUSANDS/UL (ref 149–390)
PMV BLD AUTO: 10 FL (ref 8.9–12.7)
POTASSIUM SERPL-SCNC: 3.8 MMOL/L (ref 3.5–5.3)
PROT SERPL-MCNC: 7.5 G/DL (ref 6.4–8.4)
PROT UR STRIP-MCNC: ABNORMAL MG/DL
RBC # BLD AUTO: 4.15 MILLION/UL (ref 3.81–5.12)
RBC #/AREA URNS AUTO: NORMAL /HPF
SODIUM SERPL-SCNC: 138 MMOL/L (ref 135–147)
SP GR UR STRIP.AUTO: 1.01 (ref 1–1.04)
TRIGL SERPL-MCNC: 72 MG/DL (ref ?–150)
UROBILINOGEN UA: NEGATIVE MG/DL
WBC # BLD AUTO: 9.12 THOUSAND/UL (ref 4.31–10.16)
WBC #/AREA URNS AUTO: NORMAL /HPF

## 2025-03-14 PROCEDURE — 85025 COMPLETE CBC W/AUTO DIFF WBC: CPT

## 2025-03-14 PROCEDURE — 36415 COLL VENOUS BLD VENIPUNCTURE: CPT

## 2025-03-14 PROCEDURE — 81003 URINALYSIS AUTO W/O SCOPE: CPT

## 2025-03-14 PROCEDURE — 80053 COMPREHEN METABOLIC PANEL: CPT

## 2025-03-14 PROCEDURE — 82043 UR ALBUMIN QUANTITATIVE: CPT

## 2025-03-14 PROCEDURE — 81001 URINALYSIS AUTO W/SCOPE: CPT

## 2025-03-14 PROCEDURE — 82570 ASSAY OF URINE CREATININE: CPT

## 2025-03-14 PROCEDURE — 80061 LIPID PANEL: CPT

## 2025-03-14 NOTE — RESULT ENCOUNTER NOTE
Dear Tova, the urine analysis showed that you have improved the albumin in the urine.  Rest of the labs are stable.  No need to make any changes.  The records to take to [place] are available.

## 2025-03-29 NOTE — H&P (VIEW-ONLY)
Assessment/Plan:    Chronic low back pain with right-sided sciatica  She has a red appointment for an MRI of the lumbar spine to rule out hernia of the disc  Bilateral impacted cerumen  Irrigation was performed with the right ear  Diagnoses and all orders for this visit:    Bilateral impacted cerumen  -     Ear cerumen removal    Screening for breast cancer  -     Mammo screening bilateral w cad; Future    Screening for colon cancer  -     Ambulatory Referral to GI Endoscopy; Future    Chronic low back pain with right-sided sciatica, unspecified back pain laterality          Subjective:      Patient ID: Junito Eddy is a 60 y o  female  Pt here to discuss labwork and upcoming MRI spine      Back Pain   This is a recurrent problem  The current episode started more than 1 year ago  The problem occurs constantly  The problem has been waxing and waning since onset  The pain is present in the gluteal and lumbar spine  The quality of the pain is described as aching  The pain is at a severity of 4/10  The pain is moderate  The symptoms are aggravated by lying down, position, sitting, standing and twisting  Pertinent negatives include no abdominal pain, chest pain, fever or headaches  Risk factors include obesity and lack of exercise  The treatment provided moderate relief  Hypertension   This is a recurrent problem  The current episode started more than 1 year ago  The problem has been waxing and waning since onset  Pertinent negatives include no chest pain, headaches, palpitations or shortness of breath  Risk factors for coronary artery disease include obesity, diabetes mellitus and dyslipidemia  The current treatment provides moderate improvement  Compliance problems include exercise and diet          The following portions of the patient's history were reviewed and updated as appropriate: allergies, current medications, past family history, past medical history, past social history, past surgical history and problem list     Review of Systems   Constitutional: Positive for unexpected weight change (gaining weight)  Negative for fatigue and fever  HENT: Negative for sore throat and trouble swallowing  Right here tender and hearing loss  Eyes: Negative for photophobia  Respiratory: Negative for cough, chest tightness, shortness of breath and wheezing  Choking: persistent but stable  Cardiovascular: Negative for chest pain, palpitations and leg swelling  Gastrointestinal: Negative for abdominal pain, blood in stool, nausea and vomiting  Genitourinary: Negative for hematuria  Musculoskeletal: Positive for back pain  Neurological: Negative for dizziness, syncope, light-headedness and headaches  Hematological: Does not bruise/bleed easily  Psychiatric/Behavioral: Positive for sleep disturbance  Objective:      /90 (BP Location: Left arm, Patient Position: Sitting, Cuff Size: Standard)   Pulse 89   Temp 98 °F (36 7 °C) (Oral)   Resp 16   Ht 4' 4" (1 321 m)   Wt 88 9 kg (196 lb)   SpO2 97%   Breastfeeding? No   BMI 50 96 kg/m²          Physical Exam   Constitutional: She is oriented to person, place, and time  She appears well-developed and well-nourished  Obese looking  HENT:   Head: Normocephalic  Right ear impaction of wax  Eyes: EOM are normal  Pupils are equal, round, and reactive to light  Neck: Neck supple  Cardiovascular: Normal rate, regular rhythm and normal heart sounds  Pulmonary/Chest: Effort normal    Abdominal: Soft  Bowel sounds are normal  She exhibits distension  There is tenderness (Difficult to exam due to habitus  Very obese  )  Musculoskeletal: Normal range of motion  She exhibits tenderness (Difficult to exam due to severe obesity pressure patient for decreased range of motion tenderness of the lumbar spine when exam   No neurological deficits or weakness of legs  )  Neurological: She is alert and oriented to person, place, and time   She has normal reflexes  Skin: Skin is warm and dry  Psychiatric: She has a normal mood and affect   Her behavior is normal  Judgment and thought content normal  8

## 2025-05-17 DIAGNOSIS — E11.9 TYPE 2 DIABETES MELLITUS WITHOUT COMPLICATION, UNSPECIFIED WHETHER LONG TERM INSULIN USE (HCC): ICD-10-CM

## 2025-05-18 RX ORDER — LANCETS 30 GAUGE
EACH MISCELLANEOUS
Qty: 100 EACH | Refills: 1 | Status: SHIPPED | OUTPATIENT
Start: 2025-05-18

## 2025-05-28 DIAGNOSIS — E11.65 UNCONTROLLED TYPE 2 DIABETES MELLITUS WITH HYPERGLYCEMIA (HCC): ICD-10-CM

## 2025-05-28 RX ORDER — BLOOD SUGAR DIAGNOSTIC
STRIP MISCELLANEOUS
Qty: 300 STRIP | Refills: 2 | Status: SHIPPED | OUTPATIENT
Start: 2025-05-28